# Patient Record
Sex: MALE | Race: WHITE | Employment: FULL TIME | URBAN - METROPOLITAN AREA
[De-identification: names, ages, dates, MRNs, and addresses within clinical notes are randomized per-mention and may not be internally consistent; named-entity substitution may affect disease eponyms.]

---

## 2017-01-10 ENCOUNTER — OFFICE VISIT (OUTPATIENT)
Dept: FAMILY MEDICINE CLINIC | Age: 52
End: 2017-01-10

## 2017-01-10 VITALS
RESPIRATION RATE: 24 BRPM | HEART RATE: 84 BPM | WEIGHT: 315 LBS | DIASTOLIC BLOOD PRESSURE: 88 MMHG | HEIGHT: 70 IN | TEMPERATURE: 98.1 F | SYSTOLIC BLOOD PRESSURE: 134 MMHG | BODY MASS INDEX: 45.1 KG/M2

## 2017-01-10 DIAGNOSIS — E29.1 HYPOGONADISM IN MALE: Primary | ICD-10-CM

## 2017-01-10 PROCEDURE — 99212 OFFICE O/P EST SF 10 MIN: CPT | Performed by: FAMILY MEDICINE

## 2017-01-10 PROCEDURE — 96372 THER/PROPH/DIAG INJ SC/IM: CPT | Performed by: FAMILY MEDICINE

## 2017-01-10 RX ORDER — TESTOSTERONE CYPIONATE 200 MG/ML
400 INJECTION INTRAMUSCULAR ONCE
Status: COMPLETED | OUTPATIENT
Start: 2017-01-10 | End: 2017-01-10

## 2017-01-10 RX ADMIN — TESTOSTERONE CYPIONATE 400 MG: 200 INJECTION INTRAMUSCULAR at 15:25

## 2017-02-09 ENCOUNTER — OFFICE VISIT (OUTPATIENT)
Dept: FAMILY MEDICINE CLINIC | Age: 52
End: 2017-02-09

## 2017-02-09 VITALS
WEIGHT: 314 LBS | TEMPERATURE: 97.5 F | DIASTOLIC BLOOD PRESSURE: 84 MMHG | RESPIRATION RATE: 24 BRPM | HEIGHT: 70 IN | HEART RATE: 84 BPM | SYSTOLIC BLOOD PRESSURE: 136 MMHG | BODY MASS INDEX: 44.95 KG/M2

## 2017-02-09 DIAGNOSIS — Z12.11 SCREEN FOR COLON CANCER: ICD-10-CM

## 2017-02-09 DIAGNOSIS — H66.002 ACUTE SUPPURATIVE OTITIS MEDIA OF LEFT EAR WITHOUT SPONTANEOUS RUPTURE OF TYMPANIC MEMBRANE, RECURRENCE NOT SPECIFIED: Primary | ICD-10-CM

## 2017-02-09 DIAGNOSIS — M05.79 RHEUMATOID ARTHRITIS INVOLVING MULTIPLE SITES WITH POSITIVE RHEUMATOID FACTOR (HCC): ICD-10-CM

## 2017-02-09 DIAGNOSIS — E29.1 HYPOGONADISM MALE: ICD-10-CM

## 2017-02-09 PROCEDURE — 3017F COLORECTAL CA SCREEN DOC REV: CPT | Performed by: FAMILY MEDICINE

## 2017-02-09 PROCEDURE — G8484 FLU IMMUNIZE NO ADMIN: HCPCS | Performed by: FAMILY MEDICINE

## 2017-02-09 PROCEDURE — 99213 OFFICE O/P EST LOW 20 MIN: CPT | Performed by: FAMILY MEDICINE

## 2017-02-09 PROCEDURE — G8417 CALC BMI ABV UP PARAM F/U: HCPCS | Performed by: FAMILY MEDICINE

## 2017-02-09 PROCEDURE — G8428 CUR MEDS NOT DOCUMENT: HCPCS | Performed by: FAMILY MEDICINE

## 2017-02-09 PROCEDURE — 96372 THER/PROPH/DIAG INJ SC/IM: CPT | Performed by: FAMILY MEDICINE

## 2017-02-09 PROCEDURE — 1036F TOBACCO NON-USER: CPT | Performed by: FAMILY MEDICINE

## 2017-02-09 RX ORDER — AMOXICILLIN 875 MG/1
875 TABLET, COATED ORAL 2 TIMES DAILY
Qty: 20 TABLET | Refills: 0 | Status: SHIPPED | OUTPATIENT
Start: 2017-02-09 | End: 2017-02-19

## 2017-02-09 RX ORDER — OXYCODONE AND ACETAMINOPHEN 10; 325 MG/1; MG/1
1 TABLET ORAL EVERY 4 HOURS PRN
Qty: 500 TABLET | Refills: 0 | Status: SHIPPED | OUTPATIENT
Start: 2017-02-09 | End: 2017-02-14 | Stop reason: SDUPTHER

## 2017-02-09 RX ORDER — TESTOSTERONE CYPIONATE 200 MG/ML
400 INJECTION INTRAMUSCULAR ONCE
Status: COMPLETED | OUTPATIENT
Start: 2017-02-09 | End: 2017-02-09

## 2017-02-09 RX ADMIN — TESTOSTERONE CYPIONATE 400 MG: 200 INJECTION INTRAMUSCULAR at 16:30

## 2017-02-12 ENCOUNTER — PATIENT MESSAGE (OUTPATIENT)
Dept: FAMILY MEDICINE CLINIC | Age: 52
End: 2017-02-12

## 2017-02-12 DIAGNOSIS — M05.79 RHEUMATOID ARTHRITIS INVOLVING MULTIPLE SITES WITH POSITIVE RHEUMATOID FACTOR (HCC): ICD-10-CM

## 2017-02-14 RX ORDER — OXYCODONE AND ACETAMINOPHEN 10; 325 MG/1; MG/1
1 TABLET ORAL EVERY 4 HOURS PRN
Qty: 500 TABLET | Refills: 0 | Status: SHIPPED | OUTPATIENT
Start: 2017-02-14 | End: 2017-05-04 | Stop reason: SDUPTHER

## 2017-03-03 DIAGNOSIS — I10 ESSENTIAL HYPERTENSION: Primary | ICD-10-CM

## 2017-03-03 DIAGNOSIS — E78.2 MIXED HYPERLIPIDEMIA: ICD-10-CM

## 2017-03-03 DIAGNOSIS — E11.9 TYPE 2 DIABETES MELLITUS WITHOUT COMPLICATION, WITHOUT LONG-TERM CURRENT USE OF INSULIN (HCC): ICD-10-CM

## 2017-03-04 DIAGNOSIS — E78.2 MIXED HYPERLIPIDEMIA: ICD-10-CM

## 2017-03-04 DIAGNOSIS — I10 ESSENTIAL HYPERTENSION: ICD-10-CM

## 2017-03-04 DIAGNOSIS — E11.9 TYPE 2 DIABETES MELLITUS WITHOUT COMPLICATION, WITHOUT LONG-TERM CURRENT USE OF INSULIN (HCC): ICD-10-CM

## 2017-03-04 LAB
ALBUMIN SERPL-MCNC: 4.7 G/DL (ref 3.9–4.9)
ALP BLD-CCNC: 48 U/L (ref 35–104)
ALT SERPL-CCNC: 22 U/L (ref 0–41)
ANION GAP SERPL CALCULATED.3IONS-SCNC: 14 MEQ/L (ref 7–13)
AST SERPL-CCNC: 17 U/L (ref 0–40)
BASOPHILS ABSOLUTE: 0.1 K/UL (ref 0–0.2)
BASOPHILS RELATIVE PERCENT: 0.8 %
BILIRUB SERPL-MCNC: 0.8 MG/DL (ref 0–1.2)
BUN BLDV-MCNC: 14 MG/DL (ref 6–20)
CALCIUM SERPL-MCNC: 9.3 MG/DL (ref 8.6–10.2)
CHLORIDE BLD-SCNC: 101 MEQ/L (ref 98–107)
CHOLESTEROL, TOTAL: 158 MG/DL (ref 0–199)
CO2: 23 MEQ/L (ref 22–29)
CREAT SERPL-MCNC: 1.09 MG/DL (ref 0.7–1.2)
EOSINOPHILS ABSOLUTE: 0.1 K/UL (ref 0–0.7)
EOSINOPHILS RELATIVE PERCENT: 2.2 %
GFR AFRICAN AMERICAN: >60
GFR NON-AFRICAN AMERICAN: >60
GLOBULIN: 2.6 G/DL (ref 2.3–3.5)
GLUCOSE BLD-MCNC: 159 MG/DL (ref 74–109)
HBA1C MFR BLD: 6.5 % (ref 4.8–5.9)
HCT VFR BLD CALC: 45.6 % (ref 42–52)
HDLC SERPL-MCNC: 33 MG/DL (ref 40–59)
HEMOGLOBIN: 15.3 G/DL (ref 14–18)
LDL CHOLESTEROL CALCULATED: 64 MG/DL (ref 0–129)
LYMPHOCYTES ABSOLUTE: 1.8 K/UL (ref 1–4.8)
LYMPHOCYTES RELATIVE PERCENT: 27.7 %
MCH RBC QN AUTO: 31.5 PG (ref 27–31.3)
MCHC RBC AUTO-ENTMCNC: 33.5 % (ref 33–37)
MCV RBC AUTO: 94.2 FL (ref 80–100)
MONOCYTES ABSOLUTE: 0.6 K/UL (ref 0.2–0.8)
MONOCYTES RELATIVE PERCENT: 9.4 %
NEUTROPHILS ABSOLUTE: 3.8 K/UL (ref 1.4–6.5)
NEUTROPHILS RELATIVE PERCENT: 59.9 %
PDW BLD-RTO: 13.7 % (ref 11.5–14.5)
PLATELET # BLD: 207 K/UL (ref 130–400)
POTASSIUM SERPL-SCNC: 4.3 MEQ/L (ref 3.5–5.1)
RBC # BLD: 4.84 M/UL (ref 4.7–6.1)
SODIUM BLD-SCNC: 138 MEQ/L (ref 132–144)
TOTAL PROTEIN: 7.3 G/DL (ref 6.4–8.1)
TRIGL SERPL-MCNC: 307 MG/DL (ref 0–200)
WBC # BLD: 6.4 K/UL (ref 4.8–10.8)

## 2017-03-09 ENCOUNTER — OFFICE VISIT (OUTPATIENT)
Dept: FAMILY MEDICINE CLINIC | Age: 52
End: 2017-03-09

## 2017-03-09 VITALS
HEART RATE: 78 BPM | WEIGHT: 309 LBS | SYSTOLIC BLOOD PRESSURE: 136 MMHG | RESPIRATION RATE: 24 BRPM | HEIGHT: 70 IN | TEMPERATURE: 99.3 F | BODY MASS INDEX: 44.24 KG/M2 | DIASTOLIC BLOOD PRESSURE: 88 MMHG

## 2017-03-09 DIAGNOSIS — I10 ESSENTIAL HYPERTENSION: ICD-10-CM

## 2017-03-09 DIAGNOSIS — E78.2 MIXED HYPERLIPIDEMIA: ICD-10-CM

## 2017-03-09 DIAGNOSIS — M05.79 RHEUMATOID ARTHRITIS INVOLVING MULTIPLE SITES WITH POSITIVE RHEUMATOID FACTOR (HCC): ICD-10-CM

## 2017-03-09 DIAGNOSIS — E11.9 TYPE 2 DIABETES MELLITUS WITHOUT COMPLICATION, WITHOUT LONG-TERM CURRENT USE OF INSULIN (HCC): Primary | ICD-10-CM

## 2017-03-09 DIAGNOSIS — E29.1 HYPOGONADISM MALE: ICD-10-CM

## 2017-03-09 DIAGNOSIS — M25.421 EFFUSION OF RIGHT OLECRANON BURSA: ICD-10-CM

## 2017-03-09 DIAGNOSIS — E03.9 HYPOTHYROIDISM, UNSPECIFIED TYPE: ICD-10-CM

## 2017-03-09 PROCEDURE — 96372 THER/PROPH/DIAG INJ SC/IM: CPT | Performed by: FAMILY MEDICINE

## 2017-03-09 PROCEDURE — 3044F HG A1C LEVEL LT 7.0%: CPT | Performed by: FAMILY MEDICINE

## 2017-03-09 PROCEDURE — 99214 OFFICE O/P EST MOD 30 MIN: CPT | Performed by: FAMILY MEDICINE

## 2017-03-09 PROCEDURE — 3017F COLORECTAL CA SCREEN DOC REV: CPT | Performed by: FAMILY MEDICINE

## 2017-03-09 PROCEDURE — G8417 CALC BMI ABV UP PARAM F/U: HCPCS | Performed by: FAMILY MEDICINE

## 2017-03-09 PROCEDURE — G8427 DOCREV CUR MEDS BY ELIG CLIN: HCPCS | Performed by: FAMILY MEDICINE

## 2017-03-09 PROCEDURE — G8484 FLU IMMUNIZE NO ADMIN: HCPCS | Performed by: FAMILY MEDICINE

## 2017-03-09 PROCEDURE — 1036F TOBACCO NON-USER: CPT | Performed by: FAMILY MEDICINE

## 2017-03-09 RX ORDER — TESTOSTERONE CYPIONATE 200 MG/ML
500 INJECTION INTRAMUSCULAR ONCE
Status: COMPLETED | OUTPATIENT
Start: 2017-03-09 | End: 2017-03-09

## 2017-03-09 RX ORDER — TRIAMCINOLONE ACETONIDE 40 MG/ML
80 INJECTION, SUSPENSION INTRA-ARTICULAR; INTRAMUSCULAR ONCE
Status: SHIPPED | OUTPATIENT
Start: 2017-03-09

## 2017-03-09 RX ADMIN — TESTOSTERONE CYPIONATE 500 MG: 200 INJECTION INTRAMUSCULAR at 04:45

## 2017-03-09 ASSESSMENT — ENCOUNTER SYMPTOMS
NAUSEA: 0
VOMITING: 0
ABDOMINAL PAIN: 0
COUGH: 0
BLOOD IN STOOL: 0
CONSTIPATION: 0
SHORTNESS OF BREATH: 0
TROUBLE SWALLOWING: 0
CHEST TIGHTNESS: 0
DIARRHEA: 0

## 2017-03-24 DIAGNOSIS — E11.9 TYPE 2 DIABETES MELLITUS WITHOUT COMPLICATION (HCC): ICD-10-CM

## 2017-04-05 ENCOUNTER — OFFICE VISIT (OUTPATIENT)
Dept: FAMILY MEDICINE CLINIC | Age: 52
End: 2017-04-05

## 2017-04-05 VITALS
TEMPERATURE: 98.3 F | SYSTOLIC BLOOD PRESSURE: 138 MMHG | HEIGHT: 70 IN | WEIGHT: 309 LBS | HEART RATE: 96 BPM | DIASTOLIC BLOOD PRESSURE: 86 MMHG | BODY MASS INDEX: 44.24 KG/M2 | RESPIRATION RATE: 24 BRPM

## 2017-04-05 DIAGNOSIS — H66.001 ACUTE SUPPURATIVE OTITIS MEDIA OF RIGHT EAR WITHOUT SPONTANEOUS RUPTURE OF TYMPANIC MEMBRANE, RECURRENCE NOT SPECIFIED: Primary | ICD-10-CM

## 2017-04-05 DIAGNOSIS — M05.79 RHEUMATOID ARTHRITIS INVOLVING MULTIPLE SITES WITH POSITIVE RHEUMATOID FACTOR (HCC): ICD-10-CM

## 2017-04-05 DIAGNOSIS — E29.1 HYPOGONADISM MALE: ICD-10-CM

## 2017-04-05 PROCEDURE — 3017F COLORECTAL CA SCREEN DOC REV: CPT | Performed by: FAMILY MEDICINE

## 2017-04-05 PROCEDURE — 1036F TOBACCO NON-USER: CPT | Performed by: FAMILY MEDICINE

## 2017-04-05 PROCEDURE — 99214 OFFICE O/P EST MOD 30 MIN: CPT | Performed by: FAMILY MEDICINE

## 2017-04-05 PROCEDURE — G8417 CALC BMI ABV UP PARAM F/U: HCPCS | Performed by: FAMILY MEDICINE

## 2017-04-05 PROCEDURE — 96372 THER/PROPH/DIAG INJ SC/IM: CPT | Performed by: FAMILY MEDICINE

## 2017-04-05 PROCEDURE — G8427 DOCREV CUR MEDS BY ELIG CLIN: HCPCS | Performed by: FAMILY MEDICINE

## 2017-04-05 RX ORDER — METHYLPREDNISOLONE 4 MG/1
TABLET ORAL
Qty: 21 TABLET | Refills: 2 | Status: SHIPPED | OUTPATIENT
Start: 2017-04-05 | End: 2018-05-14 | Stop reason: SDUPTHER

## 2017-04-05 RX ORDER — TESTOSTERONE CYPIONATE 200 MG/ML
500 INJECTION INTRAMUSCULAR ONCE
Status: COMPLETED | OUTPATIENT
Start: 2017-04-05 | End: 2017-04-05

## 2017-04-05 RX ORDER — AMOXICILLIN 875 MG/1
875 TABLET, COATED ORAL 2 TIMES DAILY
Qty: 20 TABLET | Refills: 0 | Status: SHIPPED | OUTPATIENT
Start: 2017-04-05 | End: 2017-04-15

## 2017-04-05 RX ADMIN — TESTOSTERONE CYPIONATE 500 MG: 200 INJECTION INTRAMUSCULAR at 17:03

## 2017-05-04 ENCOUNTER — OFFICE VISIT (OUTPATIENT)
Dept: FAMILY MEDICINE CLINIC | Age: 52
End: 2017-05-04

## 2017-05-04 VITALS
RESPIRATION RATE: 24 BRPM | BODY MASS INDEX: 44.95 KG/M2 | WEIGHT: 314 LBS | DIASTOLIC BLOOD PRESSURE: 94 MMHG | SYSTOLIC BLOOD PRESSURE: 136 MMHG | HEART RATE: 102 BPM | TEMPERATURE: 98.6 F | HEIGHT: 70 IN

## 2017-05-04 DIAGNOSIS — M05.79 RHEUMATOID ARTHRITIS INVOLVING MULTIPLE SITES WITH POSITIVE RHEUMATOID FACTOR (HCC): ICD-10-CM

## 2017-05-04 DIAGNOSIS — I10 ESSENTIAL HYPERTENSION: ICD-10-CM

## 2017-05-04 DIAGNOSIS — E11.9 TYPE 2 DIABETES MELLITUS WITHOUT COMPLICATION, WITHOUT LONG-TERM CURRENT USE OF INSULIN (HCC): ICD-10-CM

## 2017-05-04 DIAGNOSIS — E29.1 HYPOGONADISM MALE: Primary | ICD-10-CM

## 2017-05-04 DIAGNOSIS — E78.2 MIXED HYPERLIPIDEMIA: ICD-10-CM

## 2017-05-04 DIAGNOSIS — E03.9 ACQUIRED HYPOTHYROIDISM: ICD-10-CM

## 2017-05-04 PROCEDURE — 99214 OFFICE O/P EST MOD 30 MIN: CPT | Performed by: FAMILY MEDICINE

## 2017-05-04 PROCEDURE — G8417 CALC BMI ABV UP PARAM F/U: HCPCS | Performed by: FAMILY MEDICINE

## 2017-05-04 PROCEDURE — 1036F TOBACCO NON-USER: CPT | Performed by: FAMILY MEDICINE

## 2017-05-04 PROCEDURE — 3017F COLORECTAL CA SCREEN DOC REV: CPT | Performed by: FAMILY MEDICINE

## 2017-05-04 PROCEDURE — 96372 THER/PROPH/DIAG INJ SC/IM: CPT | Performed by: FAMILY MEDICINE

## 2017-05-04 PROCEDURE — 3044F HG A1C LEVEL LT 7.0%: CPT | Performed by: FAMILY MEDICINE

## 2017-05-04 PROCEDURE — G8427 DOCREV CUR MEDS BY ELIG CLIN: HCPCS | Performed by: FAMILY MEDICINE

## 2017-05-04 RX ORDER — OXYCODONE AND ACETAMINOPHEN 10; 325 MG/1; MG/1
1 TABLET ORAL EVERY 4 HOURS PRN
Qty: 500 TABLET | Refills: 0 | Status: SHIPPED | OUTPATIENT
Start: 2017-05-04 | End: 2017-08-08 | Stop reason: SDUPTHER

## 2017-05-04 RX ORDER — LOSARTAN POTASSIUM 100 MG/1
100 TABLET ORAL DAILY
Qty: 90 TABLET | Refills: 3 | Status: SHIPPED | OUTPATIENT
Start: 2017-05-04 | End: 2018-03-12 | Stop reason: SDUPTHER

## 2017-05-04 RX ORDER — TESTOSTERONE CYPIONATE 200 MG/ML
500 INJECTION INTRAMUSCULAR ONCE
Status: COMPLETED | OUTPATIENT
Start: 2017-05-04 | End: 2017-05-04

## 2017-05-04 RX ADMIN — TESTOSTERONE CYPIONATE 500 MG: 200 INJECTION INTRAMUSCULAR at 17:37

## 2017-06-10 DIAGNOSIS — E29.1 HYPOGONADISM MALE: ICD-10-CM

## 2017-06-10 DIAGNOSIS — E78.2 MIXED HYPERLIPIDEMIA: ICD-10-CM

## 2017-06-10 DIAGNOSIS — I10 ESSENTIAL HYPERTENSION: ICD-10-CM

## 2017-06-10 DIAGNOSIS — E11.9 TYPE 2 DIABETES MELLITUS WITHOUT COMPLICATION, WITHOUT LONG-TERM CURRENT USE OF INSULIN (HCC): ICD-10-CM

## 2017-06-10 DIAGNOSIS — E03.9 ACQUIRED HYPOTHYROIDISM: ICD-10-CM

## 2017-06-10 LAB
ALBUMIN SERPL-MCNC: 4.4 G/DL (ref 3.9–4.9)
ALP BLD-CCNC: 41 U/L (ref 35–104)
ALT SERPL-CCNC: 27 U/L (ref 0–41)
ANION GAP SERPL CALCULATED.3IONS-SCNC: 14 MEQ/L (ref 7–13)
AST SERPL-CCNC: 18 U/L (ref 0–40)
BASOPHILS ABSOLUTE: 0 K/UL (ref 0–0.2)
BASOPHILS RELATIVE PERCENT: 0.6 %
BILIRUB SERPL-MCNC: 0.5 MG/DL (ref 0–1.2)
BUN BLDV-MCNC: 18 MG/DL (ref 6–20)
CALCIUM SERPL-MCNC: 9 MG/DL (ref 8.6–10.2)
CHLORIDE BLD-SCNC: 101 MEQ/L (ref 98–107)
CHOLESTEROL, TOTAL: 188 MG/DL (ref 0–199)
CO2: 22 MEQ/L (ref 22–29)
CREAT SERPL-MCNC: 1.14 MG/DL (ref 0.7–1.2)
EOSINOPHILS ABSOLUTE: 0.1 K/UL (ref 0–0.7)
EOSINOPHILS RELATIVE PERCENT: 1.4 %
GFR AFRICAN AMERICAN: >60
GFR NON-AFRICAN AMERICAN: >60
GLOBULIN: 2.7 G/DL (ref 2.3–3.5)
GLUCOSE BLD-MCNC: 146 MG/DL (ref 74–109)
HBA1C MFR BLD: 7.2 % (ref 4.8–5.9)
HCT VFR BLD CALC: 44.2 % (ref 42–52)
HDLC SERPL-MCNC: 45 MG/DL (ref 40–59)
HEMOGLOBIN: 14.8 G/DL (ref 14–18)
LDL CHOLESTEROL CALCULATED: 89 MG/DL (ref 0–129)
LYMPHOCYTES ABSOLUTE: 2 K/UL (ref 1–4.8)
LYMPHOCYTES RELATIVE PERCENT: 34.1 %
MCH RBC QN AUTO: 31.7 PG (ref 27–31.3)
MCHC RBC AUTO-ENTMCNC: 33.5 % (ref 33–37)
MCV RBC AUTO: 94.6 FL (ref 80–100)
MONOCYTES ABSOLUTE: 0.5 K/UL (ref 0.2–0.8)
MONOCYTES RELATIVE PERCENT: 8.1 %
NEUTROPHILS ABSOLUTE: 3.2 K/UL (ref 1.4–6.5)
NEUTROPHILS RELATIVE PERCENT: 55.8 %
PDW BLD-RTO: 13.9 % (ref 11.5–14.5)
PLATELET # BLD: 239 K/UL (ref 130–400)
POTASSIUM SERPL-SCNC: 4.7 MEQ/L (ref 3.5–5.1)
RBC # BLD: 4.67 M/UL (ref 4.7–6.1)
SODIUM BLD-SCNC: 137 MEQ/L (ref 132–144)
TOTAL PROTEIN: 7.1 G/DL (ref 6.4–8.1)
TRIGL SERPL-MCNC: 270 MG/DL (ref 0–200)
TSH SERPL DL<=0.05 MIU/L-ACNC: 2.31 UIU/ML (ref 0.27–4.2)
WBC # BLD: 5.8 K/UL (ref 4.8–10.8)

## 2017-06-12 ENCOUNTER — OFFICE VISIT (OUTPATIENT)
Dept: FAMILY MEDICINE CLINIC | Age: 52
End: 2017-06-12

## 2017-06-12 VITALS
WEIGHT: 313 LBS | BODY MASS INDEX: 44.91 KG/M2 | SYSTOLIC BLOOD PRESSURE: 132 MMHG | HEART RATE: 102 BPM | RESPIRATION RATE: 18 BRPM | DIASTOLIC BLOOD PRESSURE: 86 MMHG | TEMPERATURE: 97.9 F

## 2017-06-12 DIAGNOSIS — I10 ESSENTIAL HYPERTENSION: ICD-10-CM

## 2017-06-12 DIAGNOSIS — M05.79 RHEUMATOID ARTHRITIS INVOLVING MULTIPLE SITES WITH POSITIVE RHEUMATOID FACTOR (HCC): ICD-10-CM

## 2017-06-12 DIAGNOSIS — E11.9 TYPE 2 DIABETES MELLITUS WITHOUT COMPLICATION, WITHOUT LONG-TERM CURRENT USE OF INSULIN (HCC): ICD-10-CM

## 2017-06-12 DIAGNOSIS — E11.9 TYPE 2 DIABETES MELLITUS WITHOUT COMPLICATION, WITHOUT LONG-TERM CURRENT USE OF INSULIN (HCC): Primary | ICD-10-CM

## 2017-06-12 DIAGNOSIS — E29.1 HYPOGONADISM MALE: ICD-10-CM

## 2017-06-12 DIAGNOSIS — E03.9 ACQUIRED HYPOTHYROIDISM: ICD-10-CM

## 2017-06-12 DIAGNOSIS — E78.2 MIXED HYPERLIPIDEMIA: ICD-10-CM

## 2017-06-12 LAB
CREATININE URINE: 150.4 MG/DL
MICROALBUMIN UR-MCNC: 3.7 MG/DL
MICROALBUMIN/CREAT UR-RTO: 24.6 MG/G (ref 0–30)
TESTOSTERONE TOTAL-MALE: 41 NG/DL (ref 300–890)

## 2017-06-12 PROCEDURE — G8427 DOCREV CUR MEDS BY ELIG CLIN: HCPCS | Performed by: FAMILY MEDICINE

## 2017-06-12 PROCEDURE — 3017F COLORECTAL CA SCREEN DOC REV: CPT | Performed by: FAMILY MEDICINE

## 2017-06-12 PROCEDURE — 3046F HEMOGLOBIN A1C LEVEL >9.0%: CPT | Performed by: FAMILY MEDICINE

## 2017-06-12 PROCEDURE — G8417 CALC BMI ABV UP PARAM F/U: HCPCS | Performed by: FAMILY MEDICINE

## 2017-06-12 PROCEDURE — 96372 THER/PROPH/DIAG INJ SC/IM: CPT | Performed by: FAMILY MEDICINE

## 2017-06-12 PROCEDURE — 99214 OFFICE O/P EST MOD 30 MIN: CPT | Performed by: FAMILY MEDICINE

## 2017-06-12 PROCEDURE — 1036F TOBACCO NON-USER: CPT | Performed by: FAMILY MEDICINE

## 2017-06-12 RX ORDER — TESTOSTERONE CYPIONATE 200 MG/ML
500 INJECTION INTRAMUSCULAR ONCE
Status: COMPLETED | OUTPATIENT
Start: 2017-06-12 | End: 2017-06-12

## 2017-06-12 RX ORDER — OXYCODONE AND ACETAMINOPHEN 10; 325 MG/1; MG/1
1 TABLET ORAL EVERY 4 HOURS PRN
Qty: 500 TABLET | Refills: 0 | Status: CANCELLED | OUTPATIENT
Start: 2017-06-12

## 2017-06-12 RX ADMIN — TESTOSTERONE CYPIONATE 500 MG: 200 INJECTION INTRAMUSCULAR at 16:31

## 2017-06-12 ASSESSMENT — ENCOUNTER SYMPTOMS
SHORTNESS OF BREATH: 0
CHEST TIGHTNESS: 0
ABDOMINAL PAIN: 0
DIARRHEA: 0
NAUSEA: 0
TROUBLE SWALLOWING: 0
COUGH: 0
VOMITING: 0
CONSTIPATION: 0
BLOOD IN STOOL: 0

## 2017-06-12 ASSESSMENT — PATIENT HEALTH QUESTIONNAIRE - PHQ9
SUM OF ALL RESPONSES TO PHQ QUESTIONS 1-9: 0
SUM OF ALL RESPONSES TO PHQ9 QUESTIONS 1 & 2: 0
2. FEELING DOWN, DEPRESSED OR HOPELESS: 0
1. LITTLE INTEREST OR PLEASURE IN DOING THINGS: 0

## 2017-07-12 ENCOUNTER — OFFICE VISIT (OUTPATIENT)
Dept: FAMILY MEDICINE CLINIC | Age: 52
End: 2017-07-12

## 2017-07-12 VITALS
DIASTOLIC BLOOD PRESSURE: 88 MMHG | WEIGHT: 315 LBS | SYSTOLIC BLOOD PRESSURE: 126 MMHG | TEMPERATURE: 98.2 F | HEART RATE: 72 BPM | BODY MASS INDEX: 45.2 KG/M2 | RESPIRATION RATE: 24 BRPM

## 2017-07-12 DIAGNOSIS — E29.1 HYPOGONADISM MALE: ICD-10-CM

## 2017-07-12 DIAGNOSIS — I10 ESSENTIAL HYPERTENSION: Primary | ICD-10-CM

## 2017-07-12 PROCEDURE — G8427 DOCREV CUR MEDS BY ELIG CLIN: HCPCS | Performed by: FAMILY MEDICINE

## 2017-07-12 PROCEDURE — 96372 THER/PROPH/DIAG INJ SC/IM: CPT | Performed by: FAMILY MEDICINE

## 2017-07-12 PROCEDURE — 3017F COLORECTAL CA SCREEN DOC REV: CPT | Performed by: FAMILY MEDICINE

## 2017-07-12 PROCEDURE — G8417 CALC BMI ABV UP PARAM F/U: HCPCS | Performed by: FAMILY MEDICINE

## 2017-07-12 PROCEDURE — 99213 OFFICE O/P EST LOW 20 MIN: CPT | Performed by: FAMILY MEDICINE

## 2017-07-12 PROCEDURE — 1036F TOBACCO NON-USER: CPT | Performed by: FAMILY MEDICINE

## 2017-07-12 RX ORDER — CARVEDILOL 12.5 MG/1
12.5 TABLET ORAL 2 TIMES DAILY
Qty: 180 TABLET | Refills: 3 | Status: SHIPPED | OUTPATIENT
Start: 2017-07-12 | End: 2018-07-12 | Stop reason: SDUPTHER

## 2017-07-12 RX ORDER — TESTOSTERONE CYPIONATE 200 MG/ML
500 INJECTION INTRAMUSCULAR ONCE
Status: COMPLETED | OUTPATIENT
Start: 2017-07-12 | End: 2017-07-12

## 2017-07-12 RX ADMIN — TESTOSTERONE CYPIONATE 500 MG: 200 INJECTION INTRAMUSCULAR at 14:34

## 2017-08-08 ENCOUNTER — OFFICE VISIT (OUTPATIENT)
Dept: FAMILY MEDICINE CLINIC | Age: 52
End: 2017-08-08

## 2017-08-08 VITALS
HEART RATE: 84 BPM | SYSTOLIC BLOOD PRESSURE: 132 MMHG | RESPIRATION RATE: 24 BRPM | DIASTOLIC BLOOD PRESSURE: 78 MMHG | WEIGHT: 315 LBS | TEMPERATURE: 98.5 F | BODY MASS INDEX: 45.77 KG/M2

## 2017-08-08 DIAGNOSIS — E11.9 TYPE 2 DIABETES MELLITUS WITHOUT COMPLICATION, WITHOUT LONG-TERM CURRENT USE OF INSULIN (HCC): ICD-10-CM

## 2017-08-08 DIAGNOSIS — M05.79 RHEUMATOID ARTHRITIS INVOLVING MULTIPLE SITES WITH POSITIVE RHEUMATOID FACTOR (HCC): ICD-10-CM

## 2017-08-08 DIAGNOSIS — E29.1 HYPOGONADISM MALE: Primary | ICD-10-CM

## 2017-08-08 DIAGNOSIS — I10 ESSENTIAL HYPERTENSION: ICD-10-CM

## 2017-08-08 DIAGNOSIS — E03.9 ACQUIRED HYPOTHYROIDISM: ICD-10-CM

## 2017-08-08 DIAGNOSIS — E78.2 MIXED HYPERLIPIDEMIA: ICD-10-CM

## 2017-08-08 PROCEDURE — 3017F COLORECTAL CA SCREEN DOC REV: CPT | Performed by: FAMILY MEDICINE

## 2017-08-08 PROCEDURE — G8427 DOCREV CUR MEDS BY ELIG CLIN: HCPCS | Performed by: FAMILY MEDICINE

## 2017-08-08 PROCEDURE — 99213 OFFICE O/P EST LOW 20 MIN: CPT | Performed by: FAMILY MEDICINE

## 2017-08-08 PROCEDURE — 3046F HEMOGLOBIN A1C LEVEL >9.0%: CPT | Performed by: FAMILY MEDICINE

## 2017-08-08 PROCEDURE — G8417 CALC BMI ABV UP PARAM F/U: HCPCS | Performed by: FAMILY MEDICINE

## 2017-08-08 PROCEDURE — 96372 THER/PROPH/DIAG INJ SC/IM: CPT | Performed by: FAMILY MEDICINE

## 2017-08-08 PROCEDURE — 1036F TOBACCO NON-USER: CPT | Performed by: FAMILY MEDICINE

## 2017-08-08 RX ORDER — AMLODIPINE BESYLATE 10 MG/1
10 TABLET ORAL DAILY
Qty: 90 TABLET | Refills: 3 | Status: SHIPPED | OUTPATIENT
Start: 2017-08-08 | End: 2018-09-18 | Stop reason: SDUPTHER

## 2017-08-08 RX ORDER — TRAMADOL HYDROCHLORIDE 50 MG/1
100 TABLET ORAL EVERY 6 HOURS PRN
Qty: 540 TABLET | Refills: 0 | Status: SHIPPED | OUTPATIENT
Start: 2017-08-08 | End: 2017-12-12 | Stop reason: SDUPTHER

## 2017-08-08 RX ORDER — TESTOSTERONE CYPIONATE 200 MG/ML
500 INJECTION INTRAMUSCULAR ONCE
Status: COMPLETED | OUTPATIENT
Start: 2017-08-08 | End: 2017-08-08

## 2017-08-08 RX ORDER — OXYCODONE AND ACETAMINOPHEN 10; 325 MG/1; MG/1
1 TABLET ORAL EVERY 4 HOURS PRN
Qty: 500 TABLET | Refills: 0 | Status: SHIPPED | OUTPATIENT
Start: 2017-08-08 | End: 2017-11-06 | Stop reason: SDUPTHER

## 2017-08-08 RX ORDER — PRAVASTATIN SODIUM 40 MG
40 TABLET ORAL EVERY EVENING
Qty: 90 TABLET | Refills: 3 | Status: SHIPPED | OUTPATIENT
Start: 2017-08-08 | End: 2018-12-18 | Stop reason: SDUPTHER

## 2017-08-08 RX ORDER — LEVOTHYROXINE SODIUM 75 MCG
TABLET ORAL
Qty: 90 TABLET | Refills: 3 | Status: SHIPPED | OUTPATIENT
Start: 2017-08-08 | End: 2018-08-03 | Stop reason: SDUPTHER

## 2017-08-08 RX ADMIN — TESTOSTERONE CYPIONATE 500 MG: 200 INJECTION INTRAMUSCULAR at 17:32

## 2017-09-09 DIAGNOSIS — E29.1 HYPOGONADISM MALE: ICD-10-CM

## 2017-09-09 DIAGNOSIS — E11.9 TYPE 2 DIABETES MELLITUS WITHOUT COMPLICATION, WITHOUT LONG-TERM CURRENT USE OF INSULIN (HCC): ICD-10-CM

## 2017-09-09 DIAGNOSIS — E78.2 MIXED HYPERLIPIDEMIA: ICD-10-CM

## 2017-09-09 DIAGNOSIS — E03.9 ACQUIRED HYPOTHYROIDISM: ICD-10-CM

## 2017-09-09 LAB
ALBUMIN SERPL-MCNC: 4.6 G/DL (ref 3.9–4.9)
ALP BLD-CCNC: 43 U/L (ref 35–104)
ALT SERPL-CCNC: 25 U/L (ref 0–41)
ANION GAP SERPL CALCULATED.3IONS-SCNC: 17 MEQ/L (ref 7–13)
AST SERPL-CCNC: 18 U/L (ref 0–40)
BILIRUB SERPL-MCNC: 0.5 MG/DL (ref 0–1.2)
BUN BLDV-MCNC: 22 MG/DL (ref 6–20)
CALCIUM SERPL-MCNC: 9.4 MG/DL (ref 8.6–10.2)
CHLORIDE BLD-SCNC: 101 MEQ/L (ref 98–107)
CHOLESTEROL, TOTAL: 180 MG/DL (ref 0–199)
CO2: 22 MEQ/L (ref 22–29)
CREAT SERPL-MCNC: 0.98 MG/DL (ref 0.7–1.2)
GFR AFRICAN AMERICAN: >60
GFR NON-AFRICAN AMERICAN: >60
GLOBULIN: 3 G/DL (ref 2.3–3.5)
GLUCOSE BLD-MCNC: 153 MG/DL (ref 74–109)
HBA1C MFR BLD: 6.8 % (ref 4.8–5.9)
HDLC SERPL-MCNC: 38 MG/DL (ref 40–59)
LDL CHOLESTEROL CALCULATED: 79 MG/DL (ref 0–129)
POTASSIUM SERPL-SCNC: 4.7 MEQ/L (ref 3.5–5.1)
SODIUM BLD-SCNC: 140 MEQ/L (ref 132–144)
TOTAL PROTEIN: 7.6 G/DL (ref 6.4–8.1)
TRIGL SERPL-MCNC: 317 MG/DL (ref 0–200)
TSH SERPL DL<=0.05 MIU/L-ACNC: 2.54 UIU/ML (ref 0.27–4.2)

## 2017-09-11 ENCOUNTER — OFFICE VISIT (OUTPATIENT)
Dept: FAMILY MEDICINE CLINIC | Age: 52
End: 2017-09-11

## 2017-09-11 VITALS
SYSTOLIC BLOOD PRESSURE: 138 MMHG | RESPIRATION RATE: 18 BRPM | TEMPERATURE: 98 F | BODY MASS INDEX: 45.34 KG/M2 | HEART RATE: 78 BPM | WEIGHT: 315 LBS | DIASTOLIC BLOOD PRESSURE: 68 MMHG

## 2017-09-11 DIAGNOSIS — E78.2 MIXED HYPERLIPIDEMIA: ICD-10-CM

## 2017-09-11 DIAGNOSIS — E11.9 TYPE 2 DIABETES MELLITUS WITHOUT COMPLICATION, WITHOUT LONG-TERM CURRENT USE OF INSULIN (HCC): Primary | ICD-10-CM

## 2017-09-11 DIAGNOSIS — I10 ESSENTIAL HYPERTENSION: ICD-10-CM

## 2017-09-11 DIAGNOSIS — M05.79 RHEUMATOID ARTHRITIS INVOLVING MULTIPLE SITES WITH POSITIVE RHEUMATOID FACTOR (HCC): ICD-10-CM

## 2017-09-11 DIAGNOSIS — E29.1 HYPOGONADISM MALE: ICD-10-CM

## 2017-09-11 LAB
SEX HORMONE BINDING GLOBULIN: 8 NMOL/L (ref 11–80)
TESTOSTERONE FREE PERCENT: 2.8 % (ref 1.6–2.9)
TESTOSTERONE FREE, CALC: 15 PG/ML (ref 47–244)
TESTOSTERONE TOTAL-MALE: 55 NG/DL (ref 300–890)

## 2017-09-11 PROCEDURE — G8427 DOCREV CUR MEDS BY ELIG CLIN: HCPCS | Performed by: FAMILY MEDICINE

## 2017-09-11 PROCEDURE — 99214 OFFICE O/P EST MOD 30 MIN: CPT | Performed by: FAMILY MEDICINE

## 2017-09-11 PROCEDURE — 3017F COLORECTAL CA SCREEN DOC REV: CPT | Performed by: FAMILY MEDICINE

## 2017-09-11 PROCEDURE — G8417 CALC BMI ABV UP PARAM F/U: HCPCS | Performed by: FAMILY MEDICINE

## 2017-09-11 PROCEDURE — 96372 THER/PROPH/DIAG INJ SC/IM: CPT | Performed by: FAMILY MEDICINE

## 2017-09-11 PROCEDURE — 1036F TOBACCO NON-USER: CPT | Performed by: FAMILY MEDICINE

## 2017-09-11 PROCEDURE — 3046F HEMOGLOBIN A1C LEVEL >9.0%: CPT | Performed by: FAMILY MEDICINE

## 2017-09-11 RX ORDER — TESTOSTERONE CYPIONATE 200 MG/ML
500 INJECTION INTRAMUSCULAR ONCE
Status: COMPLETED | OUTPATIENT
Start: 2017-09-11 | End: 2017-09-11

## 2017-09-11 RX ADMIN — TESTOSTERONE CYPIONATE 500 MG: 200 INJECTION INTRAMUSCULAR at 18:27

## 2017-09-11 ASSESSMENT — ENCOUNTER SYMPTOMS
TROUBLE SWALLOWING: 0
CHEST TIGHTNESS: 0
NAUSEA: 0
VOMITING: 0
CONSTIPATION: 0
DIARRHEA: 0
SHORTNESS OF BREATH: 0
COUGH: 0
BLOOD IN STOOL: 0
ABDOMINAL PAIN: 0

## 2017-10-09 ENCOUNTER — OFFICE VISIT (OUTPATIENT)
Dept: FAMILY MEDICINE CLINIC | Age: 52
End: 2017-10-09

## 2017-10-09 VITALS
HEART RATE: 84 BPM | WEIGHT: 314 LBS | TEMPERATURE: 98.5 F | DIASTOLIC BLOOD PRESSURE: 110 MMHG | SYSTOLIC BLOOD PRESSURE: 152 MMHG | BODY MASS INDEX: 45.05 KG/M2 | RESPIRATION RATE: 18 BRPM

## 2017-10-09 DIAGNOSIS — I10 ESSENTIAL HYPERTENSION: Primary | ICD-10-CM

## 2017-10-09 DIAGNOSIS — E29.1 HYPOGONADISM MALE: ICD-10-CM

## 2017-10-09 PROCEDURE — 96372 THER/PROPH/DIAG INJ SC/IM: CPT | Performed by: FAMILY MEDICINE

## 2017-10-09 PROCEDURE — G8417 CALC BMI ABV UP PARAM F/U: HCPCS | Performed by: FAMILY MEDICINE

## 2017-10-09 PROCEDURE — 1036F TOBACCO NON-USER: CPT | Performed by: FAMILY MEDICINE

## 2017-10-09 PROCEDURE — G8484 FLU IMMUNIZE NO ADMIN: HCPCS | Performed by: FAMILY MEDICINE

## 2017-10-09 PROCEDURE — 99212 OFFICE O/P EST SF 10 MIN: CPT | Performed by: FAMILY MEDICINE

## 2017-10-09 PROCEDURE — G8427 DOCREV CUR MEDS BY ELIG CLIN: HCPCS | Performed by: FAMILY MEDICINE

## 2017-10-09 PROCEDURE — 3017F COLORECTAL CA SCREEN DOC REV: CPT | Performed by: FAMILY MEDICINE

## 2017-10-09 RX ORDER — TESTOSTERONE CYPIONATE 200 MG/ML
500 INJECTION INTRAMUSCULAR ONCE
Status: COMPLETED | OUTPATIENT
Start: 2017-10-09 | End: 2017-10-09

## 2017-10-09 RX ADMIN — TESTOSTERONE CYPIONATE 500 MG: 200 INJECTION INTRAMUSCULAR at 14:22

## 2017-11-06 ENCOUNTER — OFFICE VISIT (OUTPATIENT)
Dept: FAMILY MEDICINE CLINIC | Age: 52
End: 2017-11-06

## 2017-11-06 VITALS
WEIGHT: 297 LBS | TEMPERATURE: 98.7 F | RESPIRATION RATE: 12 BRPM | BODY MASS INDEX: 42.52 KG/M2 | HEART RATE: 90 BPM | SYSTOLIC BLOOD PRESSURE: 132 MMHG | DIASTOLIC BLOOD PRESSURE: 88 MMHG | HEIGHT: 70 IN

## 2017-11-06 DIAGNOSIS — J01.40 ACUTE NON-RECURRENT PANSINUSITIS: ICD-10-CM

## 2017-11-06 DIAGNOSIS — E29.1 HYPOGONADISM MALE: Primary | ICD-10-CM

## 2017-11-06 DIAGNOSIS — I10 ESSENTIAL HYPERTENSION: ICD-10-CM

## 2017-11-06 DIAGNOSIS — Z12.11 ENCOUNTER FOR SCREENING COLONOSCOPY: ICD-10-CM

## 2017-11-06 DIAGNOSIS — M05.79 RHEUMATOID ARTHRITIS INVOLVING MULTIPLE SITES WITH POSITIVE RHEUMATOID FACTOR (HCC): ICD-10-CM

## 2017-11-06 DIAGNOSIS — R19.7 DIARRHEA, UNSPECIFIED TYPE: ICD-10-CM

## 2017-11-06 PROCEDURE — 99214 OFFICE O/P EST MOD 30 MIN: CPT | Performed by: FAMILY MEDICINE

## 2017-11-06 PROCEDURE — G8427 DOCREV CUR MEDS BY ELIG CLIN: HCPCS | Performed by: FAMILY MEDICINE

## 2017-11-06 PROCEDURE — G8484 FLU IMMUNIZE NO ADMIN: HCPCS | Performed by: FAMILY MEDICINE

## 2017-11-06 PROCEDURE — G8417 CALC BMI ABV UP PARAM F/U: HCPCS | Performed by: FAMILY MEDICINE

## 2017-11-06 PROCEDURE — 1036F TOBACCO NON-USER: CPT | Performed by: FAMILY MEDICINE

## 2017-11-06 PROCEDURE — 96372 THER/PROPH/DIAG INJ SC/IM: CPT | Performed by: FAMILY MEDICINE

## 2017-11-06 PROCEDURE — 3017F COLORECTAL CA SCREEN DOC REV: CPT | Performed by: FAMILY MEDICINE

## 2017-11-06 RX ORDER — TESTOSTERONE CYPIONATE 200 MG/ML
500 INJECTION INTRAMUSCULAR ONCE
Status: COMPLETED | OUTPATIENT
Start: 2017-11-06 | End: 2017-11-06

## 2017-11-06 RX ORDER — OXYCODONE AND ACETAMINOPHEN 10; 325 MG/1; MG/1
1 TABLET ORAL EVERY 4 HOURS PRN
Qty: 500 TABLET | Refills: 0 | Status: SHIPPED | OUTPATIENT
Start: 2017-11-06 | End: 2018-03-12 | Stop reason: ALTCHOICE

## 2017-11-06 RX ORDER — AMOXICILLIN AND CLAVULANATE POTASSIUM 875; 125 MG/1; MG/1
1 TABLET, FILM COATED ORAL EVERY 12 HOURS
Qty: 20 TABLET | Refills: 0 | Status: SHIPPED | OUTPATIENT
Start: 2017-11-06 | End: 2017-11-16

## 2017-11-06 RX ADMIN — TESTOSTERONE CYPIONATE 500 MG: 200 INJECTION INTRAMUSCULAR at 15:04

## 2017-11-06 NOTE — PROGRESS NOTES
well-developed and well-nourished. Neck: Normal range of motion. Neck supple. No thyromegaly present. Cardiovascular: Normal rate, regular rhythm and normal heart sounds. Pulmonary/Chest: Effort normal and breath sounds normal.   Abdominal: Bowel sounds are normal. He exhibits no distension. This patient is morbidly obese. Genitourinary:   Genitourinary Comments: Small external hemorrhoids visible, this is checked prior to administration of the testosterone injection in the right dorsal lateral gluteus area. This was done by myself. Musculoskeletal:   There is no costovertebral angle tenderness. Lumbar spine and sacroiliac joints are non tender. There is no edema in the four extremities. Pulses palpable at both posterior tibial and radial arteries. Psychiatric: He has a normal mood and affect. His behavior is normal.       DIAGNOSIS:   1. Hypogonadism male  testosterone cypionate (DEPOTESTOTERONE CYPIONATE) injection 500 mg    Stable with continuing monthly injections. 2. Essential hypertension      Well-controlled, continue current medication. 3. Rheumatoid arthritis involving multiple sites with positive rheumatoid factor (HCC)  oxyCODONE-acetaminophen (PERCOCET)  MG per tablet    Well-controlled, continue current medication. 4. Diarrhea, unspecified type      Question etiology, wait for colonoscopy. 5. Encounter for screening colonoscopy  Ambulatory referral to Gastroenterology    Due for screening colonoscopy. 6. Acute non-recurrent pansinusitis  amoxicillin-clavulanate (AUGMENTIN) 875-125 MG per tablet    Symptomatic, treat. Plan for follow up: Follow up in scheduled time with blood work as ordered. Other follow up as needed.       Electronically signed by Rivas Amato, 10:04 PM 11/6/17

## 2017-11-15 ENCOUNTER — HOSPITAL ENCOUNTER (OUTPATIENT)
Age: 52
Setting detail: OUTPATIENT SURGERY
Discharge: HOME | End: 2017-11-15
Attending: SPECIALIST | Admitting: SPECIALIST

## 2017-11-15 ENCOUNTER — ANESTHESIA (OUTPATIENT)
Dept: ENDOSCOPY | Age: 52
End: 2017-11-15

## 2017-11-15 ENCOUNTER — ANESTHESIA EVENT (OUTPATIENT)
Dept: ENDOSCOPY | Age: 52
End: 2017-11-15

## 2017-11-15 VITALS
WEIGHT: 297 LBS | DIASTOLIC BLOOD PRESSURE: 98 MMHG | TEMPERATURE: 96.6 F | RESPIRATION RATE: 16 BRPM | OXYGEN SATURATION: 97 % | SYSTOLIC BLOOD PRESSURE: 124 MMHG | BODY MASS INDEX: 42.52 KG/M2 | HEART RATE: 73 BPM | HEIGHT: 70 IN

## 2017-11-15 VITALS
OXYGEN SATURATION: 97 % | SYSTOLIC BLOOD PRESSURE: 121 MMHG | RESPIRATION RATE: 15 BRPM | DIASTOLIC BLOOD PRESSURE: 58 MMHG

## 2017-11-15 PROCEDURE — 6360000002 HC RX W HCPCS: Performed by: NURSE ANESTHETIST, CERTIFIED REGISTERED

## 2017-11-15 PROCEDURE — 7100000010 HC PHASE II RECOVERY - FIRST 15 MIN: Performed by: SPECIALIST

## 2017-11-15 PROCEDURE — 7100000011 HC PHASE II RECOVERY - ADDTL 15 MIN: Performed by: SPECIALIST

## 2017-11-15 PROCEDURE — 88305 TISSUE EXAM BY PATHOLOGIST: CPT

## 2017-11-15 PROCEDURE — 3700000001 HC ADD 15 MINUTES (ANESTHESIA): Performed by: SPECIALIST

## 2017-11-15 PROCEDURE — 3700000000 HC ANESTHESIA ATTENDED CARE: Performed by: SPECIALIST

## 2017-11-15 PROCEDURE — 2580000003 HC RX 258: Performed by: SPECIALIST

## 2017-11-15 PROCEDURE — 3609027000 HC COLONOSCOPY: Performed by: SPECIALIST

## 2017-11-15 RX ORDER — SODIUM CHLORIDE 0.9 % (FLUSH) 0.9 %
10 SYRINGE (ML) INJECTION EVERY 12 HOURS SCHEDULED
Status: DISCONTINUED | OUTPATIENT
Start: 2017-11-15 | End: 2017-11-15 | Stop reason: HOSPADM

## 2017-11-15 RX ORDER — SODIUM CHLORIDE 0.9 % (FLUSH) 0.9 %
10 SYRINGE (ML) INJECTION PRN
Status: DISCONTINUED | OUTPATIENT
Start: 2017-11-15 | End: 2017-11-15 | Stop reason: HOSPADM

## 2017-11-15 RX ORDER — SODIUM CHLORIDE 9 MG/ML
INJECTION, SOLUTION INTRAVENOUS CONTINUOUS
Status: DISCONTINUED | OUTPATIENT
Start: 2017-11-15 | End: 2017-11-15 | Stop reason: HOSPADM

## 2017-11-15 RX ORDER — ONDANSETRON 2 MG/ML
4 INJECTION INTRAMUSCULAR; INTRAVENOUS
Status: DISCONTINUED | OUTPATIENT
Start: 2017-11-15 | End: 2017-11-15 | Stop reason: HOSPADM

## 2017-11-15 RX ORDER — LIDOCAINE HYDROCHLORIDE 10 MG/ML
1 INJECTION, SOLUTION EPIDURAL; INFILTRATION; INTRACAUDAL; PERINEURAL
Status: DISCONTINUED | OUTPATIENT
Start: 2017-11-15 | End: 2017-11-15 | Stop reason: HOSPADM

## 2017-11-15 RX ORDER — PROPOFOL 10 MG/ML
INJECTION, EMULSION INTRAVENOUS CONTINUOUS PRN
Status: DISCONTINUED | OUTPATIENT
Start: 2017-11-15 | End: 2017-11-15 | Stop reason: SDUPTHER

## 2017-11-15 RX ADMIN — PROPOFOL 100 MCG/KG/MIN: 10 INJECTION, EMULSION INTRAVENOUS at 09:57

## 2017-11-15 RX ADMIN — SODIUM CHLORIDE: 9 INJECTION, SOLUTION INTRAVENOUS at 09:30

## 2017-11-15 NOTE — ANESTHESIA POSTPROCEDURE EVALUATION
Department of Anesthesiology  Postprocedure Note    Patient: Franco Castro  MRN: [id number]  YOB: 1965  Date of evaluation: 11/15/2017  Time:  10:19 AM     Procedure Summary     Date:  11/15/17 Room / Location:  [address] [place] [address] 01 / 59 [facility]    Anesthesia Start:  0957 Anesthesia Stop:      Procedure:  COLONOSCOPY (N/A ) Diagnosis:  (SCREENING Z12.11 ())    Surgeon:  Srikanth Kaye MD Responsible Provider:  Adriano Coronel CRNA    Anesthesia Type:  MAC ASA Status:  3          Anesthesia Type: MAC    Uriel Phase I:      Uriel Phase II:      Last vitals: Reviewed and per EMR flowsheets.        Anesthesia Post Evaluation    Patient location during evaluation: bedside  Patient participation: complete - patient participated  Level of consciousness: awake and awake and alert  Airway patency: patent  Nausea & Vomiting: no nausea and no vomiting  Complications: no  Cardiovascular status: blood pressure returned to baseline and hemodynamically stable  Respiratory status: acceptable  Hydration status: euvolemic

## 2017-11-15 NOTE — ANESTHESIA PRE PROCEDURE
Department of Anesthesiology  Preprocedure Note       Name:  Clarissa Chris   Age:  45 y.o.  :  1965                                          MRN:  [id number]         Date:  11/15/2017      Surgeon: Rogers Barrera):  Nani Harrison MD    Procedure: Procedure(s):  COLONOSCOPY    Medications prior to admission:   Prior to Admission medications    Medication Sig Start Date End Date Taking? Authorizing Provider   oxyCODONE-acetaminophen (PERCOCET)  MG per tablet Take 1 tablet by mouth every 4 hours as needed for Pain  Do not fill before 17. . 17   Luis Deng, MD   amoxicillin-clavulanate (AUGMENTIN) 875-125 MG per tablet Take 1 tablet by mouth every 12 hours for 10 days 17  Luis Deng, MD   SYNTHROID 75 MCG tablet TAKE 1 TABLET DAILY 17   Luis Deng MD   amLODIPine (NORVASC) 10 MG tablet Take 1 tablet by mouth daily 17   Luis Deng MD   pravastatin (PRAVACHOL) 40 MG tablet Take 1 tablet by mouth every evening 17   Luis Deng, MD   traMADol Danielle Salvage) 50 MG tablet Take 2 tablets by mouth every 6 hours as needed for Pain 17   Luis Deng, MD   carvedilol (COREG) 12.5 MG tablet Take 1 tablet by mouth 2 times daily 17   Luis Deng MD   losartan (COZAAR) 100 MG tablet Take 1 tablet by mouth daily 17   Luis Deng MD   metFORMIN (GLUCOPHAGE) 500 MG tablet TAKE 1 TABLET BY MOUTH TWICE DAILY WITH MEALS 3/25/17   Luis Deng MD   mometasone-formoterol [facility]) 100-5 MCG/ACT inhaler Inhale 2 puffs into the lungs 2 times daily 16   Luis Deng, MD   HUMIRA PEN 40 MG/0.8ML injection  16   Historical Provider, MD   azaTHIOprine (IMURAN) 50 MG tablet  2/15/16   Historical Provider, MD   leflunomide (ARAVA) 10 MG tablet  6/25/15   Historical Provider, MD   doxepin (SINEQUAN) 25 MG capsule Take 1 capsule by mouth nightly. 3/19/15   Luis Deng MD   tamsulosin (FLOMAX) 0.4 MG capsule Take 1 capsule by mouth daily.  3/19/15   Luis Deng MD filed for this visit. BP Readings from Last 3 Encounters:   11/06/17 132/88   10/09/17 (!) 152/110   09/11/17 138/68       NPO Status:                                                                                 BMI:   Wt Readings from Last 3 Encounters:   11/06/17 297 lb (134.7 kg)   10/09/17 (!) 314 lb (142.4 kg)   09/11/17 (!) 316 lb (143.3 kg)     There is no height or weight on file to calculate BMI.    CBC:   Lab Results   Component Value Date    WBC 5.8 06/10/2017    RBC 4.67 06/10/2017    RBC 4.48 03/10/2012    HGB 14.8 06/10/2017    HCT 44.2 06/10/2017    MCV 94.6 06/10/2017    RDW 13.9 06/10/2017     06/10/2017       CMP:   Lab Results   Component Value Date     09/09/2017    K 4.7 09/09/2017     09/09/2017    CO2 22 09/09/2017    BUN 22 09/09/2017    CREATININE 0.98 09/09/2017    GFRAA >60.0 09/09/2017    LABGLOM >60.0 09/09/2017    GLUCOSE 153 09/09/2017    GLUCOSE 113 03/10/2012    PROT 7.6 09/09/2017    CALCIUM 9.4 09/09/2017    BILITOT 0.5 09/09/2017    ALKPHOS 43 09/09/2017    AST 18 09/09/2017    ALT 25 09/09/2017       POC Tests: No results for input(s): POCGLU, POCNA, POCK, POCCL, POCBUN, POCHEMO, POCHCT in the last 72 hours.     Coags: No results found for: PROTIME, INR, APTT    HCG (If Applicable): No results found for: PREGTESTUR, PREGSERUM, HCG, HCGQUANT     ABGs: No results found for: PHART, PO2ART, UPY3WNV, YOJ4JVX, BEART, P7MJQVJM     Type & Screen (If Applicable):  No results found for: LABABO, [address]    Anesthesia Evaluation  Patient summary reviewed and Nursing notes reviewed  Airway: Mallampati: II  TM distance: <3 FB   Neck ROM: full  Mouth opening: < 3 FB Dental:          Pulmonary:                              Cardiovascular:    (+) hypertension:,                   Neuro/Psych:               GI/Hepatic/Renal:   (+) GERD:, morbid obesity          Endo/Other:    (+) Type II DM, , hypothyroidism: arthritis:., .                 Abdominal:           Vascular:                                        Anesthesia Plan      MAC     ASA 3       Induction: intravenous. Anesthetic plan and risks discussed with patient.                       Oxana Garcia CRNA   11/15/2017

## 2017-12-08 DIAGNOSIS — I10 ESSENTIAL HYPERTENSION: Primary | ICD-10-CM

## 2017-12-08 DIAGNOSIS — E11.9 TYPE 2 DIABETES MELLITUS WITHOUT COMPLICATION, WITHOUT LONG-TERM CURRENT USE OF INSULIN (HCC): ICD-10-CM

## 2017-12-08 DIAGNOSIS — E03.9 ACQUIRED HYPOTHYROIDISM: ICD-10-CM

## 2017-12-08 DIAGNOSIS — E78.2 MIXED HYPERLIPIDEMIA: ICD-10-CM

## 2017-12-09 DIAGNOSIS — E78.2 MIXED HYPERLIPIDEMIA: ICD-10-CM

## 2017-12-09 DIAGNOSIS — I10 ESSENTIAL HYPERTENSION: ICD-10-CM

## 2017-12-09 DIAGNOSIS — E03.9 ACQUIRED HYPOTHYROIDISM: ICD-10-CM

## 2017-12-09 DIAGNOSIS — E11.9 TYPE 2 DIABETES MELLITUS WITHOUT COMPLICATION, WITHOUT LONG-TERM CURRENT USE OF INSULIN (HCC): ICD-10-CM

## 2017-12-09 LAB
ALBUMIN SERPL-MCNC: 4.4 G/DL (ref 3.9–4.9)
ALP BLD-CCNC: 45 U/L (ref 35–104)
ALT SERPL-CCNC: 25 U/L (ref 0–41)
ANION GAP SERPL CALCULATED.3IONS-SCNC: 17 MEQ/L (ref 7–13)
AST SERPL-CCNC: 29 U/L (ref 0–40)
BASOPHILS ABSOLUTE: 0 K/UL (ref 0–0.2)
BASOPHILS RELATIVE PERCENT: 0.7 %
BILIRUB SERPL-MCNC: 0.4 MG/DL (ref 0–1.2)
BUN BLDV-MCNC: 13 MG/DL (ref 6–20)
CALCIUM SERPL-MCNC: 9.2 MG/DL (ref 8.6–10.2)
CHLORIDE BLD-SCNC: 102 MEQ/L (ref 98–107)
CHOLESTEROL, TOTAL: 160 MG/DL (ref 0–199)
CO2: 23 MEQ/L (ref 22–29)
CREAT SERPL-MCNC: 1 MG/DL (ref 0.7–1.2)
EOSINOPHILS ABSOLUTE: 0.1 K/UL (ref 0–0.7)
EOSINOPHILS RELATIVE PERCENT: 2.1 %
GFR AFRICAN AMERICAN: >60
GFR NON-AFRICAN AMERICAN: >60
GLOBULIN: 2.6 G/DL (ref 2.3–3.5)
GLUCOSE BLD-MCNC: 134 MG/DL (ref 74–109)
HBA1C MFR BLD: 6.3 % (ref 4.8–5.9)
HCT VFR BLD CALC: 40.1 % (ref 42–52)
HDLC SERPL-MCNC: 40 MG/DL (ref 40–59)
HEMOGLOBIN: 13.3 G/DL (ref 14–18)
LDL CHOLESTEROL CALCULATED: 61 MG/DL (ref 0–129)
LYMPHOCYTES ABSOLUTE: 1.7 K/UL (ref 1–4.8)
LYMPHOCYTES RELATIVE PERCENT: 32.2 %
MCH RBC QN AUTO: 32.2 PG (ref 27–31.3)
MCHC RBC AUTO-ENTMCNC: 33.1 % (ref 33–37)
MCV RBC AUTO: 97.3 FL (ref 80–100)
MONOCYTES ABSOLUTE: 0.6 K/UL (ref 0.2–0.8)
MONOCYTES RELATIVE PERCENT: 10.6 %
NEUTROPHILS ABSOLUTE: 2.8 K/UL (ref 1.4–6.5)
NEUTROPHILS RELATIVE PERCENT: 54.4 %
PDW BLD-RTO: 14.1 % (ref 11.5–14.5)
PLATELET # BLD: 278 K/UL (ref 130–400)
POTASSIUM SERPL-SCNC: 4.5 MEQ/L (ref 3.5–5.1)
RBC # BLD: 4.12 M/UL (ref 4.7–6.1)
SODIUM BLD-SCNC: 142 MEQ/L (ref 132–144)
TOTAL PROTEIN: 7 G/DL (ref 6.4–8.1)
TRIGL SERPL-MCNC: 297 MG/DL (ref 0–200)
TSH SERPL DL<=0.05 MIU/L-ACNC: 1.35 UIU/ML (ref 0.27–4.2)
WBC # BLD: 5.2 K/UL (ref 4.8–10.8)

## 2017-12-12 ENCOUNTER — OFFICE VISIT (OUTPATIENT)
Dept: FAMILY MEDICINE CLINIC | Age: 52
End: 2017-12-12

## 2017-12-12 VITALS
BODY MASS INDEX: 43.19 KG/M2 | TEMPERATURE: 98.3 F | HEART RATE: 96 BPM | SYSTOLIC BLOOD PRESSURE: 124 MMHG | DIASTOLIC BLOOD PRESSURE: 86 MMHG | RESPIRATION RATE: 18 BRPM | WEIGHT: 301 LBS

## 2017-12-12 DIAGNOSIS — L29.9 ITCHING: ICD-10-CM

## 2017-12-12 DIAGNOSIS — E29.1 HYPOGONADISM MALE: ICD-10-CM

## 2017-12-12 DIAGNOSIS — B35.4 TINEA CORPORIS: ICD-10-CM

## 2017-12-12 DIAGNOSIS — N20.0 KIDNEY STONES: ICD-10-CM

## 2017-12-12 DIAGNOSIS — E11.9 TYPE 2 DIABETES MELLITUS WITHOUT COMPLICATION, WITHOUT LONG-TERM CURRENT USE OF INSULIN (HCC): Primary | ICD-10-CM

## 2017-12-12 DIAGNOSIS — M05.79 RHEUMATOID ARTHRITIS INVOLVING MULTIPLE SITES WITH POSITIVE RHEUMATOID FACTOR (HCC): ICD-10-CM

## 2017-12-12 PROCEDURE — 96372 THER/PROPH/DIAG INJ SC/IM: CPT | Performed by: FAMILY MEDICINE

## 2017-12-12 PROCEDURE — G8484 FLU IMMUNIZE NO ADMIN: HCPCS | Performed by: FAMILY MEDICINE

## 2017-12-12 PROCEDURE — 3044F HG A1C LEVEL LT 7.0%: CPT | Performed by: FAMILY MEDICINE

## 2017-12-12 PROCEDURE — 1036F TOBACCO NON-USER: CPT | Performed by: FAMILY MEDICINE

## 2017-12-12 PROCEDURE — G8427 DOCREV CUR MEDS BY ELIG CLIN: HCPCS | Performed by: FAMILY MEDICINE

## 2017-12-12 PROCEDURE — G8417 CALC BMI ABV UP PARAM F/U: HCPCS | Performed by: FAMILY MEDICINE

## 2017-12-12 PROCEDURE — 3017F COLORECTAL CA SCREEN DOC REV: CPT | Performed by: FAMILY MEDICINE

## 2017-12-12 PROCEDURE — 99214 OFFICE O/P EST MOD 30 MIN: CPT | Performed by: FAMILY MEDICINE

## 2017-12-12 RX ORDER — TESTOSTERONE CYPIONATE 200 MG/ML
500 INJECTION INTRAMUSCULAR ONCE
Status: COMPLETED | OUTPATIENT
Start: 2017-12-12 | End: 2017-12-12

## 2017-12-12 RX ORDER — TRAMADOL HYDROCHLORIDE 50 MG/1
100 TABLET ORAL EVERY 6 HOURS PRN
Qty: 540 TABLET | Refills: 0 | Status: SHIPPED | OUTPATIENT
Start: 2017-12-12 | End: 2018-03-12 | Stop reason: SDUPTHER

## 2017-12-12 RX ADMIN — TESTOSTERONE CYPIONATE 500 MG: 200 INJECTION INTRAMUSCULAR at 16:33

## 2017-12-12 NOTE — PROGRESS NOTES
Diabetes Mellitus Type 2: Current symptoms/problems include none. Home blood sugar records: patient does not test  Any episodes of hypoglycemia? N/A  Known diabetic complications: none  Itching, patient has a rash that itches occasionally and responds well to topical treatment. Tinea corporis appears intermittently and responds well to topical treatment that needs to be continued. Kidney stones remain present and asymptomatic. Patient was encouraged to drink lots of water. Rheumatoid arthritis involving multiple joints seems to be well-controlled with current doses of pain medicine. Hypogonadism is stable as long as we supplement. Patient is due for testosterone injection today which I will give. Blood pressure less than 131/81,   BP Readings from Last 1 Encounters:   12/12/17 124/86       Social history: This pt is not a smoker. This pt does not take an aspirin a day. Last eye exam was over a year  Last diabetic foot exam was 06/2017     Lab results for chronic conditions:    GFR Non- (no units)   Date Value   12/09/2017 >60.0   09/09/2017 >60.0   06/10/2017 >60.0     No results found for: GFR  Cholesterol, Total (mg/dL)   Date Value   12/09/2017 160   09/09/2017 180   06/10/2017 188     Triglycerides (mg/dL)   Date Value   12/09/2017 297 (H)   09/09/2017 317 (H)   06/10/2017 270 (H)     HDL (mg/dL)   Date Value   12/09/2017 40   09/09/2017 38 (L)   06/10/2017 45     LDL Calculated (mg/dL)   Date Value   12/09/2017 61   09/09/2017 79   06/10/2017 89     TSH (uIU/mL)   Date Value   12/09/2017 1.350   09/09/2017 2.540   06/10/2017 2.310     Hemoglobin A1C (%)   Date Value   12/09/2017 6.3 (H)   09/09/2017 6.8 (H)   06/10/2017 7.2 (H)     Microalbumin, Random Urine (mg/dL)   Date Value   06/12/2017 3.70 (H)       Review of Systems   HENT: Negative for congestion and trouble swallowing. Respiratory: Negative for cough, chest tightness and shortness of breath.     Cardiovascular: Negative for chest pain, palpitations and leg swelling. Gastrointestinal: Negative for abdominal pain, blood in stool, constipation, diarrhea, nausea and vomiting. Endocrine: Negative for cold intolerance and heat intolerance. Neurological: Negative for dizziness and light-headedness. Psychiatric/Behavioral: Negative for confusion. The patient is not nervous/anxious. Diabetes Counseling   Patient was counseled regarding disease risks and adopting healthy behaviors. Patient was provided education materials to assist with self management. Patient was provided log (or received log during previous visit) to record blood pressure, food intake and/or blood sugar. Patient was instructed to keep log up-to-date and to always bring log to all office visits. EXAM:  Constitutional Blood pressure 124/86, pulse 96, temperature 98.3 °F (36.8 °C), temperature source Temporal, resp. rate 18, weight (!) 301 lb (136.5 kg). Physical Exam   Constitutional: He appears well-developed and well-nourished. Neck: Normal range of motion. Neck supple. No thyromegaly present. Cardiovascular: Normal rate, regular rhythm and normal heart sounds. Pulmonary/Chest: Effort normal and breath sounds normal.   Abdominal: Bowel sounds are normal. He exhibits no distension. The patient is morbidly obese. Musculoskeletal:   There is no costovertebral angle tenderness. Lumbar spine and sacroiliac joints are non tender. There is no edema in the four extremities. Pulses palpable at both posterior tibial and radial arteries. Psychiatric: He has a normal mood and affect. His behavior is normal.     DIAGNOSIS:   1. Type 2 diabetes mellitus without complication, without long-term current use of insulin (HCC)      Well controlled, continue current medication. 2. Itching      symptomatic, treat   3. Tinea corporis      treatment needed   4. Kidney stones      asymptomatic, continue water intake.    5. Rheumatoid arthritis involving multiple sites with positive rheumatoid factor (HCC)  traMADol (ULTRAM) 50 MG tablet    Well controlled, continue current medications. 6. Hypogonadism male  testosterone cypionate (DEPOTESTOTERONE CYPIONATE) injection 500 mg    Symptomatic if not treated, patient due for injection today which I myself administer. Plan for follow up: Follow up in 1 month with blood work as ordered. Other follow up as needed.       Electronically signed by Juancho Heaton, 3:07 PM 12/17/17

## 2017-12-17 ASSESSMENT — ENCOUNTER SYMPTOMS
ABDOMINAL PAIN: 0
DIARRHEA: 0
CHEST TIGHTNESS: 0
NAUSEA: 0
BLOOD IN STOOL: 0
CONSTIPATION: 0
COUGH: 0
TROUBLE SWALLOWING: 0
SHORTNESS OF BREATH: 0
VOMITING: 0

## 2018-01-15 ENCOUNTER — OFFICE VISIT (OUTPATIENT)
Dept: FAMILY MEDICINE CLINIC | Age: 53
End: 2018-01-15

## 2018-01-15 VITALS
DIASTOLIC BLOOD PRESSURE: 82 MMHG | SYSTOLIC BLOOD PRESSURE: 136 MMHG | TEMPERATURE: 97.9 F | HEART RATE: 96 BPM | RESPIRATION RATE: 24 BRPM

## 2018-01-15 DIAGNOSIS — E29.1 HYPOGONADISM MALE: Primary | ICD-10-CM

## 2018-01-15 PROCEDURE — 3017F COLORECTAL CA SCREEN DOC REV: CPT | Performed by: FAMILY MEDICINE

## 2018-01-15 PROCEDURE — G8417 CALC BMI ABV UP PARAM F/U: HCPCS | Performed by: FAMILY MEDICINE

## 2018-01-15 PROCEDURE — 99213 OFFICE O/P EST LOW 20 MIN: CPT | Performed by: FAMILY MEDICINE

## 2018-01-15 PROCEDURE — 96372 THER/PROPH/DIAG INJ SC/IM: CPT | Performed by: FAMILY MEDICINE

## 2018-01-15 PROCEDURE — G8427 DOCREV CUR MEDS BY ELIG CLIN: HCPCS | Performed by: FAMILY MEDICINE

## 2018-01-15 PROCEDURE — G8484 FLU IMMUNIZE NO ADMIN: HCPCS | Performed by: FAMILY MEDICINE

## 2018-01-15 PROCEDURE — 1036F TOBACCO NON-USER: CPT | Performed by: FAMILY MEDICINE

## 2018-01-15 RX ORDER — TESTOSTERONE CYPIONATE 200 MG/ML
400 INJECTION INTRAMUSCULAR ONCE
Status: COMPLETED | OUTPATIENT
Start: 2018-01-15 | End: 2018-01-15

## 2018-01-15 RX ADMIN — TESTOSTERONE CYPIONATE 400 MG: 200 INJECTION INTRAMUSCULAR at 18:01

## 2018-01-15 NOTE — PROGRESS NOTES
Chief Complaint   Patient presents with    Hypogonadism       HPI: Robin Bird is a 45 y.o. male presenting for follow-up of hypogonadism. Insurance will only pay for 400 mg dose not the 500 mg dose we've been given him. Patient reports that he gets tired toward the end of the interval between testosterone shots, we will need to see how he does with the lower dose. He is here for testosterone injection. Per insurance they will only cover 400 mg. EXAM:  Constitutional Blood pressure 136/82, pulse 96, temperature 97.9 °F (36.6 °C), temperature source Temporal, resp. rate 24. Physical Exam   Cardiovascular: Normal rate and regular rhythm. Pulmonary/Chest: Effort normal and breath sounds normal. No respiratory distress. He has no wheezes. Abdominal: Soft. He exhibits no distension. This patient is morbidly obese. DIAGNOSIS:   1. Hypogonadism male  testosterone cypionate (DEPOTESTOTERONE CYPIONATE) injection 400 mg    Symptomatic, responds to injection, one provided by me today. Plan for follow up: Follow up in 1 month with blood work as ordered. Other follow up as needed.       Electronically signed by Jonathan Land, 9:34 PM 1/15/18

## 2018-01-15 NOTE — LETTER
Results for orders placed or performed in visit on 12/09/17   CBC Auto Differential   Result Value Ref Range    WBC 5.2 4.8 - 10.8 K/uL    RBC 4.12 (L) 4.70 - 6.10 M/uL    Hemoglobin 13.3 (L) 14.0 - 18.0 g/dL    Hematocrit 40.1 (L) 42.0 - 52.0 %    MCV 97.3 80.0 - 100.0 fL    MCH 32.2 (H) 27.0 - 31.3 pg    MCHC 33.1 33.0 - 37.0 %    RDW 14.1 11.5 - 14.5 %    Platelets 598 582 - 513 K/uL    Neutrophils % 54.4 %    Lymphocytes % 32.2 %    Monocytes % 10.6 %    Eosinophils % 2.1 %    Basophils % 0.7 %    Neutrophils # 2.8 1.4 - 6.5 K/uL    Lymphocytes # 1.7 1.0 - 4.8 K/uL    Monocytes # 0.6 0.2 - 0.8 K/uL    Eosinophils # 0.1 0.0 - 0.7 K/uL    Basophils # 0.0 0.0 - 0.2 K/uL   Comprehensive Metabolic Panel   Result Value Ref Range    Sodium 142 132 - 144 mEq/L    Potassium 4.5 3.5 - 5.1 mEq/L    Chloride 102 98 - 107 mEq/L    CO2 23 22 - 29 mEq/L    Anion Gap 17 (H) 7 - 13 mEq/L    Glucose 134 (H) 74 - 109 mg/dL    BUN 13 6 - 20 mg/dL    CREATININE 1.00 0.70 - 1.20 mg/dL    GFR Non-African American >60.0 >60    GFR  >60.0 >60    Calcium 9.2 8.6 - 10.2 mg/dL    Total Protein 7.0 6.4 - 8.1 g/dL    Alb 4.4 3.9 - 4.9 g/dL    Total Bilirubin 0.4 0.0 - 1.2 mg/dL    Alkaline Phosphatase 45 35 - 104 U/L    ALT 25 0 - 41 U/L    AST 29 0 - 40 U/L    Globulin 2.6 2.3 - 3.5 g/dL   Lipid Panel   Result Value Ref Range    Cholesterol, Total 160 0 - 199 mg/dL    Triglycerides 297 (H) 0 - 200 mg/dL    HDL 40 40 - 59 mg/dL    LDL Calculated 61 0 - 129 mg/dL   TSH without Reflex   Result Value Ref Range    TSH 1.350 0.270 - 4.200 uIU/mL   Hemoglobin A1C   Result Value Ref Range    Hemoglobin A1C 6.3 (H) 4.8 - 5.9 %

## 2018-02-12 ENCOUNTER — OFFICE VISIT (OUTPATIENT)
Dept: FAMILY MEDICINE CLINIC | Age: 53
End: 2018-02-12
Payer: COMMERCIAL

## 2018-02-12 VITALS
HEIGHT: 70 IN | SYSTOLIC BLOOD PRESSURE: 138 MMHG | DIASTOLIC BLOOD PRESSURE: 98 MMHG | RESPIRATION RATE: 12 BRPM | HEART RATE: 81 BPM | WEIGHT: 291.7 LBS | TEMPERATURE: 98.6 F | BODY MASS INDEX: 41.76 KG/M2

## 2018-02-12 DIAGNOSIS — E29.1 HYPOGONADISM MALE: ICD-10-CM

## 2018-02-12 DIAGNOSIS — M05.79 RHEUMATOID ARTHRITIS INVOLVING MULTIPLE SITES WITH POSITIVE RHEUMATOID FACTOR (HCC): Primary | ICD-10-CM

## 2018-02-12 DIAGNOSIS — Z11.4 SCREENING FOR HIV (HUMAN IMMUNODEFICIENCY VIRUS): ICD-10-CM

## 2018-02-12 DIAGNOSIS — R51.9 SINUS HEADACHE: ICD-10-CM

## 2018-02-12 DIAGNOSIS — Z11.59 ENCOUNTER FOR HEPATITIS C SCREENING TEST FOR LOW RISK PATIENT: ICD-10-CM

## 2018-02-12 PROCEDURE — 96372 THER/PROPH/DIAG INJ SC/IM: CPT | Performed by: FAMILY MEDICINE

## 2018-02-12 PROCEDURE — G8484 FLU IMMUNIZE NO ADMIN: HCPCS | Performed by: FAMILY MEDICINE

## 2018-02-12 PROCEDURE — G8417 CALC BMI ABV UP PARAM F/U: HCPCS | Performed by: FAMILY MEDICINE

## 2018-02-12 PROCEDURE — 3017F COLORECTAL CA SCREEN DOC REV: CPT | Performed by: FAMILY MEDICINE

## 2018-02-12 PROCEDURE — 1036F TOBACCO NON-USER: CPT | Performed by: FAMILY MEDICINE

## 2018-02-12 PROCEDURE — 99214 OFFICE O/P EST MOD 30 MIN: CPT | Performed by: FAMILY MEDICINE

## 2018-02-12 PROCEDURE — G8427 DOCREV CUR MEDS BY ELIG CLIN: HCPCS | Performed by: FAMILY MEDICINE

## 2018-02-12 RX ORDER — DULOXETIN HYDROCHLORIDE 30 MG/1
30 CAPSULE, DELAYED RELEASE ORAL DAILY
Qty: 46 CAPSULE | Refills: 0 | Status: SHIPPED | OUTPATIENT
Start: 2018-02-12 | End: 2018-03-12 | Stop reason: SDUPTHER

## 2018-02-12 RX ORDER — TESTOSTERONE CYPIONATE 200 MG/ML
400 INJECTION INTRAMUSCULAR ONCE
Status: COMPLETED | OUTPATIENT
Start: 2018-02-12 | End: 2018-02-12

## 2018-02-12 RX ADMIN — TESTOSTERONE CYPIONATE 400 MG: 200 INJECTION INTRAMUSCULAR at 19:26

## 2018-02-12 NOTE — PROGRESS NOTES
normal heart sounds. Pulmonary/Chest: Effort normal and breath sounds normal.   Abdominal: Bowel sounds are normal. He exhibits no distension. The patient is morbidly obese. Musculoskeletal:   There is no costovertebral angle tenderness. Lumbar spine and sacroiliac joints are non tender. There is no edema in the four extremities. Pulses palpable at both posterior tibial and radial arteries. Psychiatric: He has a normal mood and affect. His behavior is normal.     Testosterone injection given in the left gluteus muscle. DIAGNOSIS:   1. Rheumatoid arthritis involving multiple sites with positive rheumatoid factor (HCC)  DULoxetine (CYMBALTA) 30 MG extended release capsule   2. Hypogonadism male  testosterone cypionate (DEPOTESTOTERONE CYPIONATE) injection 400 mg   3. Sinus headache      Present, hopefully will improve with some passage of time. 4. Encounter for hepatitis C screening test for low risk patient  Hepatitis C Antibody   5. Screening for HIV (human immunodeficiency virus)  Hiv-1,-2 W/Reflex To Hiv-1 Western Blot     Plan for follow up: Follow up in 1 month with blood work as ordered. Other follow up as needed.       Electronically signed by Jun Montana, 5:43 PM 2/16/18

## 2018-03-09 DIAGNOSIS — E03.9 ACQUIRED HYPOTHYROIDISM: ICD-10-CM

## 2018-03-09 DIAGNOSIS — E78.2 MIXED HYPERLIPIDEMIA: ICD-10-CM

## 2018-03-09 DIAGNOSIS — E11.9 TYPE 2 DIABETES MELLITUS WITHOUT COMPLICATION, WITHOUT LONG-TERM CURRENT USE OF INSULIN (HCC): Primary | ICD-10-CM

## 2018-03-10 DIAGNOSIS — E03.9 ACQUIRED HYPOTHYROIDISM: ICD-10-CM

## 2018-03-10 DIAGNOSIS — E78.2 MIXED HYPERLIPIDEMIA: ICD-10-CM

## 2018-03-10 DIAGNOSIS — Z11.59 ENCOUNTER FOR HEPATITIS C SCREENING TEST FOR LOW RISK PATIENT: ICD-10-CM

## 2018-03-10 DIAGNOSIS — Z11.4 SCREENING FOR HIV (HUMAN IMMUNODEFICIENCY VIRUS): ICD-10-CM

## 2018-03-10 DIAGNOSIS — E11.9 TYPE 2 DIABETES MELLITUS WITHOUT COMPLICATION, WITHOUT LONG-TERM CURRENT USE OF INSULIN (HCC): ICD-10-CM

## 2018-03-10 LAB
ALBUMIN SERPL-MCNC: 4.3 G/DL (ref 3.9–4.9)
ALP BLD-CCNC: 46 U/L (ref 35–104)
ALT SERPL-CCNC: 24 U/L (ref 0–41)
ANION GAP SERPL CALCULATED.3IONS-SCNC: 15 MEQ/L (ref 7–13)
AST SERPL-CCNC: 17 U/L (ref 0–40)
BILIRUB SERPL-MCNC: 0.4 MG/DL (ref 0–1.2)
BUN BLDV-MCNC: 14 MG/DL (ref 6–20)
CALCIUM SERPL-MCNC: 8.8 MG/DL (ref 8.6–10.2)
CHLORIDE BLD-SCNC: 104 MEQ/L (ref 98–107)
CHOLESTEROL, TOTAL: 151 MG/DL (ref 0–199)
CO2: 21 MEQ/L (ref 22–29)
CREAT SERPL-MCNC: 1.03 MG/DL (ref 0.7–1.2)
GFR AFRICAN AMERICAN: >60
GFR NON-AFRICAN AMERICAN: >60
GLOBULIN: 2.7 G/DL (ref 2.3–3.5)
GLUCOSE BLD-MCNC: 138 MG/DL (ref 74–109)
HBA1C MFR BLD: 7 % (ref 4.8–5.9)
HDLC SERPL-MCNC: 38 MG/DL (ref 40–59)
HEPATITIS C ANTIBODY INTERPRETATION: NORMAL
LDL CHOLESTEROL CALCULATED: 75 MG/DL (ref 0–129)
POTASSIUM SERPL-SCNC: 4.9 MEQ/L (ref 3.5–5.1)
SODIUM BLD-SCNC: 140 MEQ/L (ref 132–144)
TOTAL PROTEIN: 7 G/DL (ref 6.4–8.1)
TRIGL SERPL-MCNC: 192 MG/DL (ref 0–200)
TSH SERPL DL<=0.05 MIU/L-ACNC: 1.54 UIU/ML (ref 0.27–4.2)

## 2018-03-12 ENCOUNTER — OFFICE VISIT (OUTPATIENT)
Dept: FAMILY MEDICINE CLINIC | Age: 53
End: 2018-03-12
Payer: COMMERCIAL

## 2018-03-12 VITALS
SYSTOLIC BLOOD PRESSURE: 136 MMHG | RESPIRATION RATE: 16 BRPM | TEMPERATURE: 98.7 F | HEART RATE: 75 BPM | HEIGHT: 70 IN | DIASTOLIC BLOOD PRESSURE: 80 MMHG | BODY MASS INDEX: 41.63 KG/M2 | WEIGHT: 290.8 LBS

## 2018-03-12 DIAGNOSIS — M05.79 RHEUMATOID ARTHRITIS INVOLVING MULTIPLE SITES WITH POSITIVE RHEUMATOID FACTOR (HCC): ICD-10-CM

## 2018-03-12 DIAGNOSIS — E11.9 TYPE 2 DIABETES MELLITUS WITHOUT COMPLICATION, UNSPECIFIED LONG TERM INSULIN USE STATUS: Primary | ICD-10-CM

## 2018-03-12 DIAGNOSIS — I10 ESSENTIAL HYPERTENSION: ICD-10-CM

## 2018-03-12 DIAGNOSIS — E29.1 HYPOGONADISM MALE: ICD-10-CM

## 2018-03-12 DIAGNOSIS — E78.2 MIXED HYPERLIPIDEMIA: ICD-10-CM

## 2018-03-12 LAB — HIV-1 AND HIV-2 ANTIBODIES: NEGATIVE

## 2018-03-12 PROCEDURE — G8427 DOCREV CUR MEDS BY ELIG CLIN: HCPCS | Performed by: FAMILY MEDICINE

## 2018-03-12 PROCEDURE — G8417 CALC BMI ABV UP PARAM F/U: HCPCS | Performed by: FAMILY MEDICINE

## 2018-03-12 PROCEDURE — 3017F COLORECTAL CA SCREEN DOC REV: CPT | Performed by: FAMILY MEDICINE

## 2018-03-12 PROCEDURE — G8484 FLU IMMUNIZE NO ADMIN: HCPCS | Performed by: FAMILY MEDICINE

## 2018-03-12 PROCEDURE — 3045F PR MOST RECENT HEMOGLOBIN A1C LEVEL 7.0-9.0%: CPT | Performed by: FAMILY MEDICINE

## 2018-03-12 PROCEDURE — 96372 THER/PROPH/DIAG INJ SC/IM: CPT | Performed by: FAMILY MEDICINE

## 2018-03-12 PROCEDURE — 1036F TOBACCO NON-USER: CPT | Performed by: FAMILY MEDICINE

## 2018-03-12 PROCEDURE — 99214 OFFICE O/P EST MOD 30 MIN: CPT | Performed by: FAMILY MEDICINE

## 2018-03-12 RX ORDER — TESTOSTERONE CYPIONATE 200 MG/ML
400 INJECTION INTRAMUSCULAR ONCE
Status: COMPLETED | OUTPATIENT
Start: 2018-03-12 | End: 2018-03-12

## 2018-03-12 RX ORDER — TRAMADOL HYDROCHLORIDE 50 MG/1
100 TABLET ORAL 2 TIMES DAILY PRN
Qty: 180 TABLET | Refills: 0 | Status: SHIPPED | OUTPATIENT
Start: 2018-03-12 | End: 2018-06-10

## 2018-03-12 RX ORDER — LOSARTAN POTASSIUM 100 MG/1
100 TABLET ORAL DAILY
Qty: 90 TABLET | Refills: 3 | Status: SHIPPED | OUTPATIENT
Start: 2018-03-12 | End: 2019-03-20 | Stop reason: SDUPTHER

## 2018-03-12 RX ORDER — DULOXETIN HYDROCHLORIDE 60 MG/1
60 CAPSULE, DELAYED RELEASE ORAL DAILY
Qty: 90 CAPSULE | Refills: 3 | Status: SHIPPED | OUTPATIENT
Start: 2018-03-12 | End: 2019-03-20 | Stop reason: SDUPTHER

## 2018-03-12 RX ADMIN — TESTOSTERONE CYPIONATE 400 MG: 200 INJECTION INTRAMUSCULAR at 18:11

## 2018-03-12 ASSESSMENT — PATIENT HEALTH QUESTIONNAIRE - PHQ9
2. FEELING DOWN, DEPRESSED OR HOPELESS: 0
1. LITTLE INTEREST OR PLEASURE IN DOING THINGS: 0
SUM OF ALL RESPONSES TO PHQ9 QUESTIONS 1 & 2: 0
SUM OF ALL RESPONSES TO PHQ QUESTIONS 1-9: 0

## 2018-03-12 NOTE — PROGRESS NOTES
Diabetes Mellitus Type 2: Current symptoms/problems include none. Home blood sugar records: patient does not test  Any episodes of hypoglycemia? n/a  Known diabetic complications: none. Rheumatoid arthritis is present causing pain although patient is off of the Percocets at least.  Hypertension is well-controlled with low-salt diet and current medication. Hyperlipidemia is well controlled with diet and current medicine. He has no side effects. The patient has hypogonadism. Patient is also here for is testosterone injection. Blood pressure less than 131/81,   BP Readings from Last 1 Encounters:   03/12/18 136/80       Social history: This pt is not a smoker. This pt does not take an aspirin a day. Last eye exam was a year ago  Last diabetic foot exam was TODAY   Lab results for chronic conditions:    GFR Non- (no units)   Date Value   03/10/2018 >60.0   12/09/2017 >60.0   09/09/2017 >60.0     No results found for: GFR  Cholesterol, Total (mg/dL)   Date Value   03/10/2018 151   12/09/2017 160   09/09/2017 180     Triglycerides (mg/dL)   Date Value   03/10/2018 192   12/09/2017 297 (H)   09/09/2017 317 (H)     HDL (mg/dL)   Date Value   03/10/2018 38 (L)   12/09/2017 40   09/09/2017 38 (L)     LDL Calculated (mg/dL)   Date Value   03/10/2018 75   12/09/2017 61   09/09/2017 79     TSH (uIU/mL)   Date Value   03/10/2018 1.540   12/09/2017 1.350   09/09/2017 2.540     Hemoglobin A1C (%)   Date Value   03/10/2018 7.0 (H)   12/09/2017 6.3 (H)   09/09/2017 6.8 (H)     Microalbumin, Random Urine (mg/dL)   Date Value   06/12/2017 3.70 (H)     Review of Systems   HENT: Negative for congestion and trouble swallowing. Respiratory: Negative for cough, chest tightness and shortness of breath. Cardiovascular: Negative for chest pain, palpitations and leg swelling. Gastrointestinal: Negative for abdominal pain, blood in stool, constipation, diarrhea, nausea and vomiting.    Endocrine: Negative

## 2018-03-16 ASSESSMENT — ENCOUNTER SYMPTOMS
SHORTNESS OF BREATH: 0
COUGH: 0
ABDOMINAL PAIN: 0
CHEST TIGHTNESS: 0
TROUBLE SWALLOWING: 0
VOMITING: 0
NAUSEA: 0
BACK PAIN: 1
BLOOD IN STOOL: 0
DIARRHEA: 0
CONSTIPATION: 0

## 2018-04-12 ENCOUNTER — OFFICE VISIT (OUTPATIENT)
Dept: FAMILY MEDICINE CLINIC | Age: 53
End: 2018-04-12
Payer: COMMERCIAL

## 2018-04-12 VITALS
BODY MASS INDEX: 40.29 KG/M2 | RESPIRATION RATE: 10 BRPM | TEMPERATURE: 96.5 F | WEIGHT: 281.4 LBS | OXYGEN SATURATION: 95 % | HEIGHT: 70 IN | SYSTOLIC BLOOD PRESSURE: 134 MMHG | HEART RATE: 88 BPM | DIASTOLIC BLOOD PRESSURE: 84 MMHG

## 2018-04-12 DIAGNOSIS — E29.1 HYPOGONADISM MALE: Primary | ICD-10-CM

## 2018-04-12 DIAGNOSIS — J20.9 ACUTE BRONCHITIS, UNSPECIFIED ORGANISM: ICD-10-CM

## 2018-04-12 PROCEDURE — 96372 THER/PROPH/DIAG INJ SC/IM: CPT | Performed by: FAMILY MEDICINE

## 2018-04-12 PROCEDURE — G8427 DOCREV CUR MEDS BY ELIG CLIN: HCPCS | Performed by: FAMILY MEDICINE

## 2018-04-12 PROCEDURE — G8417 CALC BMI ABV UP PARAM F/U: HCPCS | Performed by: FAMILY MEDICINE

## 2018-04-12 PROCEDURE — 99213 OFFICE O/P EST LOW 20 MIN: CPT | Performed by: FAMILY MEDICINE

## 2018-04-12 PROCEDURE — 3017F COLORECTAL CA SCREEN DOC REV: CPT | Performed by: FAMILY MEDICINE

## 2018-04-12 PROCEDURE — 1036F TOBACCO NON-USER: CPT | Performed by: FAMILY MEDICINE

## 2018-04-12 RX ORDER — TESTOSTERONE CYPIONATE 200 MG/ML
400 INJECTION INTRAMUSCULAR ONCE
Status: COMPLETED | OUTPATIENT
Start: 2018-04-12 | End: 2018-04-12

## 2018-04-12 RX ORDER — AZITHROMYCIN 250 MG/1
TABLET, FILM COATED ORAL
Qty: 6 TABLET | Refills: 0 | Status: SHIPPED | OUTPATIENT
Start: 2018-04-12 | End: 2018-04-22

## 2018-04-12 RX ADMIN — TESTOSTERONE CYPIONATE 400 MG: 200 INJECTION INTRAMUSCULAR at 15:37

## 2018-04-12 ASSESSMENT — PATIENT HEALTH QUESTIONNAIRE - PHQ9
SUM OF ALL RESPONSES TO PHQ9 QUESTIONS 1 & 2: 0
SUM OF ALL RESPONSES TO PHQ QUESTIONS 1-9: 0
1. LITTLE INTEREST OR PLEASURE IN DOING THINGS: 0
2. FEELING DOWN, DEPRESSED OR HOPELESS: 0

## 2018-05-09 DIAGNOSIS — D50.0 BLOOD LOSS ANEMIA: ICD-10-CM

## 2018-05-09 DIAGNOSIS — I10 ESSENTIAL HYPERTENSION: ICD-10-CM

## 2018-05-09 DIAGNOSIS — I10 ESSENTIAL HYPERTENSION: Primary | ICD-10-CM

## 2018-05-09 LAB
ALBUMIN SERPL-MCNC: 4.1 G/DL (ref 3.9–4.9)
ALP BLD-CCNC: 48 U/L (ref 35–104)
ALT SERPL-CCNC: 20 U/L (ref 0–41)
ANION GAP SERPL CALCULATED.3IONS-SCNC: 17 MEQ/L (ref 7–13)
AST SERPL-CCNC: 18 U/L (ref 0–40)
BASOPHILS ABSOLUTE: 0 K/UL (ref 0–0.2)
BASOPHILS RELATIVE PERCENT: 0.5 %
BILIRUB SERPL-MCNC: <0.2 MG/DL (ref 0–1.2)
BUN BLDV-MCNC: 24 MG/DL (ref 6–20)
CALCIUM SERPL-MCNC: 8.6 MG/DL (ref 8.6–10.2)
CHLORIDE BLD-SCNC: 103 MEQ/L (ref 98–107)
CO2: 22 MEQ/L (ref 22–29)
CREAT SERPL-MCNC: 1.4 MG/DL (ref 0.7–1.2)
EOSINOPHILS ABSOLUTE: 0.1 K/UL (ref 0–0.7)
EOSINOPHILS RELATIVE PERCENT: 1.6 %
FERRITIN: 18 NG/ML (ref 30–400)
GFR AFRICAN AMERICAN: >60
GFR NON-AFRICAN AMERICAN: 52.6
GLOBULIN: 2.4 G/DL (ref 2.3–3.5)
GLUCOSE BLD-MCNC: 148 MG/DL (ref 74–109)
HCT VFR BLD CALC: 27.6 % (ref 42–52)
HEMOGLOBIN: 9.7 G/DL (ref 14–18)
LYMPHOCYTES ABSOLUTE: 2.4 K/UL (ref 1–4.8)
LYMPHOCYTES RELATIVE PERCENT: 37.5 %
MCH RBC QN AUTO: 33.6 PG (ref 27–31.3)
MCHC RBC AUTO-ENTMCNC: 35.3 % (ref 33–37)
MCV RBC AUTO: 95.4 FL (ref 80–100)
MONOCYTES ABSOLUTE: 0.6 K/UL (ref 0.2–0.8)
MONOCYTES RELATIVE PERCENT: 9.7 %
NEUTROPHILS ABSOLUTE: 3.2 K/UL (ref 1.4–6.5)
NEUTROPHILS RELATIVE PERCENT: 50.7 %
PDW BLD-RTO: 15.7 % (ref 11.5–14.5)
PLATELET # BLD: 404 K/UL (ref 130–400)
POTASSIUM SERPL-SCNC: 4.8 MEQ/L (ref 3.5–5.1)
RBC # BLD: 2.89 M/UL (ref 4.7–6.1)
SODIUM BLD-SCNC: 142 MEQ/L (ref 132–144)
TOTAL PROTEIN: 6.5 G/DL (ref 6.4–8.1)
WBC # BLD: 6.3 K/UL (ref 4.8–10.8)

## 2018-05-14 ENCOUNTER — OFFICE VISIT (OUTPATIENT)
Dept: FAMILY MEDICINE CLINIC | Age: 53
End: 2018-05-14
Payer: COMMERCIAL

## 2018-05-14 VITALS
HEART RATE: 102 BPM | TEMPERATURE: 98.2 F | SYSTOLIC BLOOD PRESSURE: 132 MMHG | RESPIRATION RATE: 12 BRPM | OXYGEN SATURATION: 96 % | BODY MASS INDEX: 41.86 KG/M2 | WEIGHT: 292.4 LBS | DIASTOLIC BLOOD PRESSURE: 76 MMHG | HEIGHT: 70 IN

## 2018-05-14 DIAGNOSIS — D50.0 BLOOD LOSS ANEMIA: ICD-10-CM

## 2018-05-14 DIAGNOSIS — M05.79 RHEUMATOID ARTHRITIS INVOLVING MULTIPLE SITES WITH POSITIVE RHEUMATOID FACTOR (HCC): ICD-10-CM

## 2018-05-14 DIAGNOSIS — E29.1 HYPOGONADISM MALE: Primary | ICD-10-CM

## 2018-05-14 PROCEDURE — 1036F TOBACCO NON-USER: CPT | Performed by: FAMILY MEDICINE

## 2018-05-14 PROCEDURE — G8417 CALC BMI ABV UP PARAM F/U: HCPCS | Performed by: FAMILY MEDICINE

## 2018-05-14 PROCEDURE — 3017F COLORECTAL CA SCREEN DOC REV: CPT | Performed by: FAMILY MEDICINE

## 2018-05-14 PROCEDURE — G8427 DOCREV CUR MEDS BY ELIG CLIN: HCPCS | Performed by: FAMILY MEDICINE

## 2018-05-14 PROCEDURE — 99213 OFFICE O/P EST LOW 20 MIN: CPT | Performed by: FAMILY MEDICINE

## 2018-05-14 PROCEDURE — 96372 THER/PROPH/DIAG INJ SC/IM: CPT | Performed by: FAMILY MEDICINE

## 2018-05-14 RX ORDER — METHYLPREDNISOLONE 4 MG/1
TABLET ORAL
Qty: 21 TABLET | Refills: 2 | Status: SHIPPED | OUTPATIENT
Start: 2018-05-14 | End: 2018-05-20

## 2018-05-14 RX ORDER — PANTOPRAZOLE SODIUM 40 MG/1
TABLET, DELAYED RELEASE ORAL
Refills: 0 | COMMUNITY
Start: 2018-05-02 | End: 2018-06-14 | Stop reason: SDUPTHER

## 2018-05-14 RX ORDER — TESTOSTERONE CYPIONATE 200 MG/ML
400 INJECTION INTRAMUSCULAR ONCE
Status: COMPLETED | OUTPATIENT
Start: 2018-05-14 | End: 2018-05-14

## 2018-05-14 RX ADMIN — TESTOSTERONE CYPIONATE 400 MG: 200 INJECTION INTRAMUSCULAR at 17:34

## 2018-05-14 ASSESSMENT — PATIENT HEALTH QUESTIONNAIRE - PHQ9
2. FEELING DOWN, DEPRESSED OR HOPELESS: 0
SUM OF ALL RESPONSES TO PHQ QUESTIONS 1-9: 0
1. LITTLE INTEREST OR PLEASURE IN DOING THINGS: 0
SUM OF ALL RESPONSES TO PHQ9 QUESTIONS 1 & 2: 0

## 2018-06-14 ENCOUNTER — OFFICE VISIT (OUTPATIENT)
Dept: FAMILY MEDICINE CLINIC | Age: 53
End: 2018-06-14
Payer: COMMERCIAL

## 2018-06-14 VITALS
DIASTOLIC BLOOD PRESSURE: 82 MMHG | BODY MASS INDEX: 41.86 KG/M2 | OXYGEN SATURATION: 95 % | TEMPERATURE: 98 F | RESPIRATION RATE: 12 BRPM | SYSTOLIC BLOOD PRESSURE: 130 MMHG | HEIGHT: 70 IN | HEART RATE: 96 BPM | WEIGHT: 292.4 LBS

## 2018-06-14 DIAGNOSIS — K21.9 GASTROESOPHAGEAL REFLUX DISEASE, ESOPHAGITIS PRESENCE NOT SPECIFIED: ICD-10-CM

## 2018-06-14 DIAGNOSIS — E29.1 HYPOGONADISM MALE: Primary | ICD-10-CM

## 2018-06-14 DIAGNOSIS — E03.9 ACQUIRED HYPOTHYROIDISM: ICD-10-CM

## 2018-06-14 PROCEDURE — 3017F COLORECTAL CA SCREEN DOC REV: CPT | Performed by: FAMILY MEDICINE

## 2018-06-14 PROCEDURE — G8427 DOCREV CUR MEDS BY ELIG CLIN: HCPCS | Performed by: FAMILY MEDICINE

## 2018-06-14 PROCEDURE — 1036F TOBACCO NON-USER: CPT | Performed by: FAMILY MEDICINE

## 2018-06-14 PROCEDURE — G8417 CALC BMI ABV UP PARAM F/U: HCPCS | Performed by: FAMILY MEDICINE

## 2018-06-14 PROCEDURE — 99213 OFFICE O/P EST LOW 20 MIN: CPT | Performed by: FAMILY MEDICINE

## 2018-06-14 RX ORDER — PANTOPRAZOLE SODIUM 40 MG/1
TABLET, DELAYED RELEASE ORAL
Qty: 90 TABLET | Refills: 3 | Status: SHIPPED | OUTPATIENT
Start: 2018-06-14 | End: 2019-03-20 | Stop reason: SDUPTHER

## 2018-06-14 ASSESSMENT — PATIENT HEALTH QUESTIONNAIRE - PHQ9
SUM OF ALL RESPONSES TO PHQ9 QUESTIONS 1 & 2: 0
SUM OF ALL RESPONSES TO PHQ QUESTIONS 1-9: 0
2. FEELING DOWN, DEPRESSED OR HOPELESS: 0
1. LITTLE INTEREST OR PLEASURE IN DOING THINGS: 0

## 2018-06-16 RX ORDER — TESTOSTERONE CYPIONATE 200 MG/ML
400 INJECTION INTRAMUSCULAR ONCE
Status: DISCONTINUED | OUTPATIENT
Start: 2018-06-16 | End: 2018-08-16

## 2018-07-12 ENCOUNTER — OFFICE VISIT (OUTPATIENT)
Dept: FAMILY MEDICINE CLINIC | Age: 53
End: 2018-07-12
Payer: COMMERCIAL

## 2018-07-12 VITALS
WEIGHT: 294 LBS | TEMPERATURE: 99 F | SYSTOLIC BLOOD PRESSURE: 136 MMHG | BODY MASS INDEX: 42.09 KG/M2 | HEART RATE: 94 BPM | DIASTOLIC BLOOD PRESSURE: 84 MMHG | HEIGHT: 70 IN | RESPIRATION RATE: 12 BRPM | OXYGEN SATURATION: 96 %

## 2018-07-12 DIAGNOSIS — R20.9 SKIN SENSATION DISTURBANCE: ICD-10-CM

## 2018-07-12 DIAGNOSIS — E29.1 HYPOGONADISM MALE: Primary | ICD-10-CM

## 2018-07-12 DIAGNOSIS — E78.2 MIXED HYPERLIPIDEMIA: ICD-10-CM

## 2018-07-12 DIAGNOSIS — I10 ESSENTIAL HYPERTENSION: ICD-10-CM

## 2018-07-12 DIAGNOSIS — L82.1 SEBORRHEIC KERATOSES: ICD-10-CM

## 2018-07-12 DIAGNOSIS — E03.9 ACQUIRED HYPOTHYROIDISM: ICD-10-CM

## 2018-07-12 PROCEDURE — G8417 CALC BMI ABV UP PARAM F/U: HCPCS | Performed by: FAMILY MEDICINE

## 2018-07-12 PROCEDURE — 1036F TOBACCO NON-USER: CPT | Performed by: FAMILY MEDICINE

## 2018-07-12 PROCEDURE — 17110 DESTRUCTION B9 LES UP TO 14: CPT | Performed by: FAMILY MEDICINE

## 2018-07-12 PROCEDURE — G8427 DOCREV CUR MEDS BY ELIG CLIN: HCPCS | Performed by: FAMILY MEDICINE

## 2018-07-12 PROCEDURE — 99212 OFFICE O/P EST SF 10 MIN: CPT | Performed by: FAMILY MEDICINE

## 2018-07-12 PROCEDURE — 11301 SHAVE SKIN LESION 0.6-1.0 CM: CPT | Performed by: FAMILY MEDICINE

## 2018-07-12 PROCEDURE — 3017F COLORECTAL CA SCREEN DOC REV: CPT | Performed by: FAMILY MEDICINE

## 2018-07-12 RX ORDER — CARVEDILOL 12.5 MG/1
12.5 TABLET ORAL 2 TIMES DAILY
Qty: 180 TABLET | Refills: 3 | Status: SHIPPED | OUTPATIENT
Start: 2018-07-12 | End: 2019-03-20 | Stop reason: SDUPTHER

## 2018-07-13 DIAGNOSIS — R20.9 SKIN SENSATION DISTURBANCE: ICD-10-CM

## 2018-07-13 DIAGNOSIS — L82.1 SEBORRHEIC KERATOSES: ICD-10-CM

## 2018-08-03 DIAGNOSIS — E03.9 ACQUIRED HYPOTHYROIDISM: ICD-10-CM

## 2018-08-04 RX ORDER — LEVOTHYROXINE SODIUM 75 MCG
TABLET ORAL
Qty: 90 TABLET | Refills: 3 | Status: SHIPPED | OUTPATIENT
Start: 2018-08-04 | End: 2019-03-20 | Stop reason: SDUPTHER

## 2018-08-11 DIAGNOSIS — E29.1 HYPOGONADISM MALE: ICD-10-CM

## 2018-08-11 DIAGNOSIS — E78.2 MIXED HYPERLIPIDEMIA: ICD-10-CM

## 2018-08-11 DIAGNOSIS — I10 ESSENTIAL HYPERTENSION: ICD-10-CM

## 2018-08-11 DIAGNOSIS — E03.9 ACQUIRED HYPOTHYROIDISM: ICD-10-CM

## 2018-08-11 LAB
ALBUMIN SERPL-MCNC: 4.3 G/DL (ref 3.9–4.9)
ALP BLD-CCNC: 43 U/L (ref 35–104)
ALT SERPL-CCNC: 30 U/L (ref 0–41)
ANION GAP SERPL CALCULATED.3IONS-SCNC: 14 MEQ/L (ref 7–13)
AST SERPL-CCNC: 28 U/L (ref 0–40)
BASOPHILS ABSOLUTE: 0 K/UL (ref 0–0.2)
BASOPHILS RELATIVE PERCENT: 0.6 %
BILIRUB SERPL-MCNC: 0.4 MG/DL (ref 0–1.2)
BUN BLDV-MCNC: 18 MG/DL (ref 6–20)
CALCIUM SERPL-MCNC: 8.9 MG/DL (ref 8.6–10.2)
CHLORIDE BLD-SCNC: 101 MEQ/L (ref 98–107)
CHOLESTEROL, TOTAL: 172 MG/DL (ref 0–199)
CO2: 25 MEQ/L (ref 22–29)
CREAT SERPL-MCNC: 1.05 MG/DL (ref 0.7–1.2)
EOSINOPHILS ABSOLUTE: 0.1 K/UL (ref 0–0.7)
EOSINOPHILS RELATIVE PERCENT: 1.7 %
GFR AFRICAN AMERICAN: >60
GFR NON-AFRICAN AMERICAN: >60
GLOBULIN: 3.1 G/DL (ref 2.3–3.5)
GLUCOSE BLD-MCNC: 144 MG/DL (ref 74–109)
HCT VFR BLD CALC: 41.5 % (ref 42–52)
HDLC SERPL-MCNC: 42 MG/DL (ref 40–59)
HEMOGLOBIN: 14.1 G/DL (ref 14–18)
LDL CHOLESTEROL CALCULATED: 85 MG/DL (ref 0–129)
LYMPHOCYTES ABSOLUTE: 1.4 K/UL (ref 1–4.8)
LYMPHOCYTES RELATIVE PERCENT: 30.5 %
MCH RBC QN AUTO: 30.1 PG (ref 27–31.3)
MCHC RBC AUTO-ENTMCNC: 33.9 % (ref 33–37)
MCV RBC AUTO: 88.8 FL (ref 80–100)
MONOCYTES ABSOLUTE: 0.5 K/UL (ref 0.2–0.8)
MONOCYTES RELATIVE PERCENT: 10.1 %
NEUTROPHILS ABSOLUTE: 2.7 K/UL (ref 1.4–6.5)
NEUTROPHILS RELATIVE PERCENT: 57.1 %
PDW BLD-RTO: 16 % (ref 11.5–14.5)
PLATELET # BLD: 256 K/UL (ref 130–400)
POTASSIUM SERPL-SCNC: 4.5 MEQ/L (ref 3.5–5.1)
PROSTATE SPECIFIC ANTIGEN: 0.83 NG/ML (ref 0–3.89)
RBC # BLD: 4.67 M/UL (ref 4.7–6.1)
SODIUM BLD-SCNC: 140 MEQ/L (ref 132–144)
TOTAL PROTEIN: 7.4 G/DL (ref 6.4–8.1)
TRIGL SERPL-MCNC: 224 MG/DL (ref 0–200)
TSH SERPL DL<=0.05 MIU/L-ACNC: 1.15 UIU/ML (ref 0.27–4.2)
WBC # BLD: 4.6 K/UL (ref 4.8–10.8)

## 2018-08-13 LAB — TESTOSTERONE TOTAL-MALE: 54 NG/DL (ref 300–890)

## 2018-08-16 ENCOUNTER — OFFICE VISIT (OUTPATIENT)
Dept: FAMILY MEDICINE CLINIC | Age: 53
End: 2018-08-16
Payer: COMMERCIAL

## 2018-08-16 VITALS
BODY MASS INDEX: 42.43 KG/M2 | DIASTOLIC BLOOD PRESSURE: 68 MMHG | TEMPERATURE: 98 F | SYSTOLIC BLOOD PRESSURE: 130 MMHG | OXYGEN SATURATION: 94 % | HEART RATE: 99 BPM | HEIGHT: 70 IN | RESPIRATION RATE: 12 BRPM | WEIGHT: 296.4 LBS

## 2018-08-16 DIAGNOSIS — E78.2 MIXED HYPERLIPIDEMIA: ICD-10-CM

## 2018-08-16 DIAGNOSIS — M05.79 RHEUMATOID ARTHRITIS INVOLVING MULTIPLE SITES WITH POSITIVE RHEUMATOID FACTOR (HCC): ICD-10-CM

## 2018-08-16 DIAGNOSIS — I10 ESSENTIAL HYPERTENSION: ICD-10-CM

## 2018-08-16 DIAGNOSIS — E11.9 TYPE 2 DIABETES MELLITUS WITHOUT COMPLICATION, WITHOUT LONG-TERM CURRENT USE OF INSULIN (HCC): Primary | ICD-10-CM

## 2018-08-16 DIAGNOSIS — E29.1 HYPOGONADISM MALE: ICD-10-CM

## 2018-08-16 PROCEDURE — G8427 DOCREV CUR MEDS BY ELIG CLIN: HCPCS | Performed by: FAMILY MEDICINE

## 2018-08-16 PROCEDURE — 83036 HEMOGLOBIN GLYCOSYLATED A1C: CPT | Performed by: FAMILY MEDICINE

## 2018-08-16 PROCEDURE — 2022F DILAT RTA XM EVC RTNOPTHY: CPT | Performed by: FAMILY MEDICINE

## 2018-08-16 PROCEDURE — 99214 OFFICE O/P EST MOD 30 MIN: CPT | Performed by: FAMILY MEDICINE

## 2018-08-16 PROCEDURE — 96372 THER/PROPH/DIAG INJ SC/IM: CPT | Performed by: FAMILY MEDICINE

## 2018-08-16 PROCEDURE — 3045F PR MOST RECENT HEMOGLOBIN A1C LEVEL 7.0-9.0%: CPT | Performed by: FAMILY MEDICINE

## 2018-08-16 PROCEDURE — G8417 CALC BMI ABV UP PARAM F/U: HCPCS | Performed by: FAMILY MEDICINE

## 2018-08-16 PROCEDURE — 3017F COLORECTAL CA SCREEN DOC REV: CPT | Performed by: FAMILY MEDICINE

## 2018-08-16 PROCEDURE — 1036F TOBACCO NON-USER: CPT | Performed by: FAMILY MEDICINE

## 2018-08-16 RX ORDER — METHYLPREDNISOLONE ACETATE 80 MG/ML
80 INJECTION, SUSPENSION INTRA-ARTICULAR; INTRALESIONAL; INTRAMUSCULAR; SOFT TISSUE ONCE
Status: COMPLETED | OUTPATIENT
Start: 2018-08-16 | End: 2018-08-16

## 2018-08-16 RX ORDER — TESTOSTERONE CYPIONATE 200 MG/ML
400 INJECTION INTRAMUSCULAR ONCE
Status: COMPLETED | OUTPATIENT
Start: 2018-08-16 | End: 2018-08-16

## 2018-08-16 RX ADMIN — TESTOSTERONE CYPIONATE 400 MG: 200 INJECTION INTRAMUSCULAR at 17:35

## 2018-08-16 RX ADMIN — METHYLPREDNISOLONE ACETATE 80 MG: 80 INJECTION, SUSPENSION INTRA-ARTICULAR; INTRALESIONAL; INTRAMUSCULAR; SOFT TISSUE at 17:33

## 2018-08-16 ASSESSMENT — PATIENT HEALTH QUESTIONNAIRE - PHQ9
SUM OF ALL RESPONSES TO PHQ9 QUESTIONS 1 & 2: 0
SUM OF ALL RESPONSES TO PHQ QUESTIONS 1-9: 0
SUM OF ALL RESPONSES TO PHQ QUESTIONS 1-9: 0
2. FEELING DOWN, DEPRESSED OR HOPELESS: 0
1. LITTLE INTEREST OR PLEASURE IN DOING THINGS: 0

## 2018-08-17 ASSESSMENT — ENCOUNTER SYMPTOMS
COUGH: 0
NAUSEA: 0
VOMITING: 0
DIARRHEA: 0
ABDOMINAL PAIN: 0
SHORTNESS OF BREATH: 0
TROUBLE SWALLOWING: 0
CHEST TIGHTNESS: 0
BLOOD IN STOOL: 0
CONSTIPATION: 0

## 2018-09-06 ENCOUNTER — OFFICE VISIT (OUTPATIENT)
Dept: FAMILY MEDICINE CLINIC | Age: 53
End: 2018-09-06
Payer: COMMERCIAL

## 2018-09-06 VITALS
OXYGEN SATURATION: 96 % | DIASTOLIC BLOOD PRESSURE: 80 MMHG | SYSTOLIC BLOOD PRESSURE: 128 MMHG | WEIGHT: 305.4 LBS | HEIGHT: 70 IN | TEMPERATURE: 97.8 F | HEART RATE: 89 BPM | RESPIRATION RATE: 14 BRPM | BODY MASS INDEX: 43.72 KG/M2

## 2018-09-06 DIAGNOSIS — E11.9 TYPE 2 DIABETES MELLITUS WITHOUT COMPLICATION, WITHOUT LONG-TERM CURRENT USE OF INSULIN (HCC): ICD-10-CM

## 2018-09-06 DIAGNOSIS — E29.1 HYPOGONADISM MALE: Primary | ICD-10-CM

## 2018-09-06 LAB — HBA1C MFR BLD: 7.3 %

## 2018-09-06 PROCEDURE — G8417 CALC BMI ABV UP PARAM F/U: HCPCS | Performed by: FAMILY MEDICINE

## 2018-09-06 PROCEDURE — 3045F PR MOST RECENT HEMOGLOBIN A1C LEVEL 7.0-9.0%: CPT | Performed by: FAMILY MEDICINE

## 2018-09-06 PROCEDURE — 2022F DILAT RTA XM EVC RTNOPTHY: CPT | Performed by: FAMILY MEDICINE

## 2018-09-06 PROCEDURE — 3017F COLORECTAL CA SCREEN DOC REV: CPT | Performed by: FAMILY MEDICINE

## 2018-09-06 PROCEDURE — 99212 OFFICE O/P EST SF 10 MIN: CPT | Performed by: FAMILY MEDICINE

## 2018-09-06 PROCEDURE — 1036F TOBACCO NON-USER: CPT | Performed by: FAMILY MEDICINE

## 2018-09-06 PROCEDURE — 83036 HEMOGLOBIN GLYCOSYLATED A1C: CPT | Performed by: FAMILY MEDICINE

## 2018-09-06 PROCEDURE — 96372 THER/PROPH/DIAG INJ SC/IM: CPT | Performed by: FAMILY MEDICINE

## 2018-09-06 PROCEDURE — G8427 DOCREV CUR MEDS BY ELIG CLIN: HCPCS | Performed by: FAMILY MEDICINE

## 2018-09-06 RX ORDER — TESTOSTERONE CYPIONATE 200 MG/ML
400 INJECTION INTRAMUSCULAR ONCE
Status: COMPLETED | OUTPATIENT
Start: 2018-09-06 | End: 2018-09-06

## 2018-09-06 RX ADMIN — TESTOSTERONE CYPIONATE 400 MG: 200 INJECTION INTRAMUSCULAR at 12:13

## 2018-09-06 ASSESSMENT — PATIENT HEALTH QUESTIONNAIRE - PHQ9
SUM OF ALL RESPONSES TO PHQ QUESTIONS 1-9: 0
SUM OF ALL RESPONSES TO PHQ9 QUESTIONS 1 & 2: 0
1. LITTLE INTEREST OR PLEASURE IN DOING THINGS: 0
SUM OF ALL RESPONSES TO PHQ QUESTIONS 1-9: 0
2. FEELING DOWN, DEPRESSED OR HOPELESS: 0

## 2018-09-06 NOTE — PROGRESS NOTES
Chief Complaint   Patient presents with    Hypogonadism       HPI: Gerri Haynes is a 52 y.o. male presenting for follow-up of hypogonadism. He notices that he \"is dropping off\" a couple days before the shot. He is back on the Cymbalta which helps the pain but he was walking around in a fog. Work is stressful now also. The patient is diabetic and we need to recheck his hemoglobin A1c. We will do that with point-of-care testing. EXAM:  Constitutional Blood pressure 128/80, pulse 89, temperature 97.8 °F (36.6 °C), temperature source Temporal, resp. rate 14, height 5' 10\" (1.778 m), weight (!) 305 lb 6.4 oz (138.5 kg), SpO2 96 %. Physical Exam   Constitutional: He appears well-developed and well-nourished. Cardiovascular: Normal rate and regular rhythm. Pulmonary/Chest: Effort normal and breath sounds normal.     Finger stick hemoglobin A1C is 7.3%    DIAGNOSIS:    Diagnosis Orders   1. Hypogonadism male  testosterone cypionate (DEPOTESTOTERONE CYPIONATE) injection 400 mg   2. Type 2 diabetes mellitus without complication, without long-term current use of insulin ([facility])  POCT glycosylated hemoglobin (Hb A1C)     Plan for follow up: Follow up in 3 weeksfor next testosterone injection. Other follow up as needed.       Electronically signed by Marcella Valdovinos, 9:31 PM 9/6/18

## 2018-09-18 DIAGNOSIS — I10 ESSENTIAL HYPERTENSION: ICD-10-CM

## 2018-09-19 RX ORDER — AMLODIPINE BESYLATE 10 MG/1
10 TABLET ORAL DAILY
Qty: 90 TABLET | Refills: 3 | Status: SHIPPED | OUTPATIENT
Start: 2018-09-19 | End: 2019-03-20 | Stop reason: SDUPTHER

## 2018-09-27 ENCOUNTER — OFFICE VISIT (OUTPATIENT)
Dept: FAMILY MEDICINE CLINIC | Age: 53
End: 2018-09-27
Payer: COMMERCIAL

## 2018-09-27 VITALS
WEIGHT: 304 LBS | DIASTOLIC BLOOD PRESSURE: 78 MMHG | RESPIRATION RATE: 14 BRPM | SYSTOLIC BLOOD PRESSURE: 134 MMHG | OXYGEN SATURATION: 94 % | TEMPERATURE: 98.6 F | BODY MASS INDEX: 43.52 KG/M2 | HEART RATE: 90 BPM | HEIGHT: 70 IN

## 2018-09-27 DIAGNOSIS — E78.2 MIXED HYPERLIPIDEMIA: ICD-10-CM

## 2018-09-27 DIAGNOSIS — E29.1 HYPOGONADISM MALE: Primary | ICD-10-CM

## 2018-09-27 DIAGNOSIS — M05.79 RHEUMATOID ARTHRITIS INVOLVING MULTIPLE SITES WITH POSITIVE RHEUMATOID FACTOR (HCC): ICD-10-CM

## 2018-09-27 PROCEDURE — G8417 CALC BMI ABV UP PARAM F/U: HCPCS | Performed by: FAMILY MEDICINE

## 2018-09-27 PROCEDURE — 1036F TOBACCO NON-USER: CPT | Performed by: FAMILY MEDICINE

## 2018-09-27 PROCEDURE — 99212 OFFICE O/P EST SF 10 MIN: CPT | Performed by: FAMILY MEDICINE

## 2018-09-27 PROCEDURE — G8427 DOCREV CUR MEDS BY ELIG CLIN: HCPCS | Performed by: FAMILY MEDICINE

## 2018-09-27 PROCEDURE — 3017F COLORECTAL CA SCREEN DOC REV: CPT | Performed by: FAMILY MEDICINE

## 2018-09-27 RX ORDER — ROSUVASTATIN CALCIUM 40 MG/1
40 TABLET, COATED ORAL EVERY EVENING
Qty: 90 TABLET | Refills: 3 | Status: SHIPPED | OUTPATIENT
Start: 2018-09-27 | End: 2018-11-23 | Stop reason: SINTOL

## 2018-09-27 RX ORDER — TRAMADOL HYDROCHLORIDE 50 MG/1
100 TABLET ORAL EVERY 6 HOURS PRN
Qty: 240 TABLET | Refills: 0 | Status: SHIPPED | OUTPATIENT
Start: 2018-09-27 | End: 2018-10-27

## 2018-09-27 ASSESSMENT — PATIENT HEALTH QUESTIONNAIRE - PHQ9
SUM OF ALL RESPONSES TO PHQ9 QUESTIONS 1 & 2: 0
SUM OF ALL RESPONSES TO PHQ QUESTIONS 1-9: 0
1. LITTLE INTEREST OR PLEASURE IN DOING THINGS: 0
2. FEELING DOWN, DEPRESSED OR HOPELESS: 0
SUM OF ALL RESPONSES TO PHQ QUESTIONS 1-9: 0

## 2018-10-01 PROCEDURE — 96372 THER/PROPH/DIAG INJ SC/IM: CPT | Performed by: FAMILY MEDICINE

## 2018-10-01 RX ORDER — TESTOSTERONE CYPIONATE 200 MG/ML
400 INJECTION INTRAMUSCULAR ONCE
Status: COMPLETED | OUTPATIENT
Start: 2018-10-01 | End: 2018-10-01

## 2018-10-01 RX ADMIN — TESTOSTERONE CYPIONATE 400 MG: 200 INJECTION INTRAMUSCULAR at 08:00

## 2018-10-25 ENCOUNTER — OFFICE VISIT (OUTPATIENT)
Dept: FAMILY MEDICINE CLINIC | Age: 53
End: 2018-10-25
Payer: COMMERCIAL

## 2018-10-25 VITALS
WEIGHT: 307.4 LBS | HEART RATE: 76 BPM | TEMPERATURE: 97.7 F | OXYGEN SATURATION: 97 % | HEIGHT: 70 IN | SYSTOLIC BLOOD PRESSURE: 130 MMHG | DIASTOLIC BLOOD PRESSURE: 72 MMHG | RESPIRATION RATE: 14 BRPM | BODY MASS INDEX: 44.01 KG/M2

## 2018-10-25 DIAGNOSIS — E29.1 HYPOGONADISM MALE: Primary | ICD-10-CM

## 2018-10-25 PROCEDURE — G8484 FLU IMMUNIZE NO ADMIN: HCPCS | Performed by: FAMILY MEDICINE

## 2018-10-25 PROCEDURE — 96372 THER/PROPH/DIAG INJ SC/IM: CPT | Performed by: FAMILY MEDICINE

## 2018-10-25 PROCEDURE — 3017F COLORECTAL CA SCREEN DOC REV: CPT | Performed by: FAMILY MEDICINE

## 2018-10-25 PROCEDURE — G8427 DOCREV CUR MEDS BY ELIG CLIN: HCPCS | Performed by: FAMILY MEDICINE

## 2018-10-25 PROCEDURE — 1036F TOBACCO NON-USER: CPT | Performed by: FAMILY MEDICINE

## 2018-10-25 PROCEDURE — G8417 CALC BMI ABV UP PARAM F/U: HCPCS | Performed by: FAMILY MEDICINE

## 2018-10-25 PROCEDURE — 99212 OFFICE O/P EST SF 10 MIN: CPT | Performed by: FAMILY MEDICINE

## 2018-10-25 RX ORDER — TESTOSTERONE CYPIONATE 200 MG/ML
400 INJECTION INTRAMUSCULAR ONCE
Status: COMPLETED | OUTPATIENT
Start: 2018-10-25 | End: 2018-10-25

## 2018-10-25 RX ADMIN — TESTOSTERONE CYPIONATE 400 MG: 200 INJECTION INTRAMUSCULAR at 14:52

## 2018-11-23 ENCOUNTER — OFFICE VISIT (OUTPATIENT)
Dept: FAMILY MEDICINE CLINIC | Age: 53
End: 2018-11-23
Payer: COMMERCIAL

## 2018-11-23 VITALS
WEIGHT: 304 LBS | TEMPERATURE: 98 F | SYSTOLIC BLOOD PRESSURE: 130 MMHG | BODY MASS INDEX: 43.52 KG/M2 | RESPIRATION RATE: 16 BRPM | OXYGEN SATURATION: 95 % | DIASTOLIC BLOOD PRESSURE: 80 MMHG | HEIGHT: 70 IN | HEART RATE: 94 BPM

## 2018-11-23 DIAGNOSIS — E78.2 MIXED HYPERLIPIDEMIA: ICD-10-CM

## 2018-11-23 DIAGNOSIS — E29.1 HYPOGONADISM MALE: Primary | ICD-10-CM

## 2018-11-23 LAB
FOLATE: 12.9 NG/ML (ref 7.3–26.1)
VITAMIN B-12: 346 PG/ML (ref 232–1245)

## 2018-11-23 PROCEDURE — 3017F COLORECTAL CA SCREEN DOC REV: CPT | Performed by: FAMILY MEDICINE

## 2018-11-23 PROCEDURE — 99213 OFFICE O/P EST LOW 20 MIN: CPT | Performed by: FAMILY MEDICINE

## 2018-11-23 PROCEDURE — G8417 CALC BMI ABV UP PARAM F/U: HCPCS | Performed by: FAMILY MEDICINE

## 2018-11-23 PROCEDURE — G8484 FLU IMMUNIZE NO ADMIN: HCPCS | Performed by: FAMILY MEDICINE

## 2018-11-23 PROCEDURE — 1036F TOBACCO NON-USER: CPT | Performed by: FAMILY MEDICINE

## 2018-11-23 PROCEDURE — 96372 THER/PROPH/DIAG INJ SC/IM: CPT | Performed by: FAMILY MEDICINE

## 2018-11-23 PROCEDURE — G8427 DOCREV CUR MEDS BY ELIG CLIN: HCPCS | Performed by: FAMILY MEDICINE

## 2018-11-23 RX ORDER — TESTOSTERONE CYPIONATE 200 MG/ML
400 INJECTION INTRAMUSCULAR ONCE
Status: COMPLETED | OUTPATIENT
Start: 2018-11-23 | End: 2018-11-23

## 2018-11-23 RX ADMIN — TESTOSTERONE CYPIONATE 400 MG: 200 INJECTION INTRAMUSCULAR at 11:30

## 2018-11-26 ENCOUNTER — HOSPITAL ENCOUNTER (OUTPATIENT)
Dept: MRI IMAGING | Age: 53
Discharge: HOME | End: 2018-11-28
Payer: COMMERCIAL

## 2018-11-26 DIAGNOSIS — M54.12 CERVICAL RADICULOPATHY: ICD-10-CM

## 2018-11-26 DIAGNOSIS — M54.16 LUMBAR RADICULOPATHY: ICD-10-CM

## 2018-11-26 DIAGNOSIS — M54.14 THORACIC RADICULOPATHY: ICD-10-CM

## 2018-11-26 LAB — DOUBLE STRANDED DNA AB, IGG: NORMAL

## 2018-11-26 PROCEDURE — 72146 MRI CHEST SPINE W/O DYE: CPT

## 2018-11-26 PROCEDURE — 72148 MRI LUMBAR SPINE W/O DYE: CPT

## 2018-11-26 PROCEDURE — 72141 MRI NECK SPINE W/O DYE: CPT

## 2018-12-08 DIAGNOSIS — E29.1 HYPOGONADISM MALE: ICD-10-CM

## 2018-12-08 DIAGNOSIS — E11.9 TYPE 2 DIABETES MELLITUS WITHOUT COMPLICATION, WITHOUT LONG-TERM CURRENT USE OF INSULIN (HCC): ICD-10-CM

## 2018-12-08 DIAGNOSIS — I10 ESSENTIAL HYPERTENSION: ICD-10-CM

## 2018-12-08 DIAGNOSIS — E78.2 MIXED HYPERLIPIDEMIA: ICD-10-CM

## 2018-12-08 LAB
ALBUMIN SERPL-MCNC: 4.6 G/DL (ref 3.9–4.9)
ALP BLD-CCNC: 58 U/L (ref 35–104)
ALT SERPL-CCNC: 30 U/L (ref 0–41)
ANION GAP SERPL CALCULATED.3IONS-SCNC: 14 MEQ/L (ref 7–13)
AST SERPL-CCNC: 29 U/L (ref 0–40)
BASOPHILS ABSOLUTE: 0 K/UL (ref 0–0.2)
BASOPHILS RELATIVE PERCENT: 0.9 %
BILIRUB SERPL-MCNC: 0.4 MG/DL (ref 0–1.2)
BUN BLDV-MCNC: 15 MG/DL (ref 6–20)
CALCIUM SERPL-MCNC: 9.5 MG/DL (ref 8.6–10.2)
CHLORIDE BLD-SCNC: 98 MEQ/L (ref 98–107)
CHOLESTEROL, TOTAL: 266 MG/DL (ref 0–199)
CO2: 26 MEQ/L (ref 22–29)
CREAT SERPL-MCNC: 0.99 MG/DL (ref 0.7–1.2)
CREATININE URINE: 93.6 MG/DL
EOSINOPHILS ABSOLUTE: 0.1 K/UL (ref 0–0.7)
EOSINOPHILS RELATIVE PERCENT: 1.2 %
GFR AFRICAN AMERICAN: >60
GFR NON-AFRICAN AMERICAN: >60
GLOBULIN: 3.3 G/DL (ref 2.3–3.5)
GLUCOSE BLD-MCNC: 192 MG/DL (ref 74–109)
HBA1C MFR BLD: 8.3 % (ref 4.8–5.9)
HCT VFR BLD CALC: 46.1 % (ref 42–52)
HDLC SERPL-MCNC: 36 MG/DL (ref 40–59)
HEMOGLOBIN: 15.7 G/DL (ref 14–18)
LDL CHOLESTEROL CALCULATED: ABNORMAL MG/DL (ref 0–129)
LYMPHOCYTES ABSOLUTE: 1.1 K/UL (ref 1–4.8)
LYMPHOCYTES RELATIVE PERCENT: 24.7 %
MCH RBC QN AUTO: 32 PG (ref 27–31.3)
MCHC RBC AUTO-ENTMCNC: 34.2 % (ref 33–37)
MCV RBC AUTO: 93.8 FL (ref 80–100)
MICROALBUMIN UR-MCNC: 27 MG/DL
MICROALBUMIN/CREAT UR-RTO: 288.5 MG/G (ref 0–30)
MONOCYTES ABSOLUTE: 0.5 K/UL (ref 0.2–0.8)
MONOCYTES RELATIVE PERCENT: 10.4 %
NEUTROPHILS ABSOLUTE: 2.9 K/UL (ref 1.4–6.5)
NEUTROPHILS RELATIVE PERCENT: 62.8 %
PDW BLD-RTO: 14.3 % (ref 11.5–14.5)
PLATELET # BLD: 264 K/UL (ref 130–400)
POTASSIUM SERPL-SCNC: 4.3 MEQ/L (ref 3.5–5.1)
RBC # BLD: 4.91 M/UL (ref 4.7–6.1)
SODIUM BLD-SCNC: 138 MEQ/L (ref 132–144)
TOTAL PROTEIN: 7.9 G/DL (ref 6.4–8.1)
TRIGL SERPL-MCNC: 418 MG/DL (ref 0–200)
WBC # BLD: 4.6 K/UL (ref 4.8–10.8)

## 2018-12-11 LAB — TESTOSTERONE TOTAL-MALE: 521 NG/DL (ref 300–890)

## 2018-12-18 ENCOUNTER — OFFICE VISIT (OUTPATIENT)
Dept: FAMILY MEDICINE CLINIC | Age: 53
End: 2018-12-18
Payer: COMMERCIAL

## 2018-12-18 VITALS
DIASTOLIC BLOOD PRESSURE: 82 MMHG | SYSTOLIC BLOOD PRESSURE: 126 MMHG | TEMPERATURE: 98.4 F | WEIGHT: 307.3 LBS | HEIGHT: 70 IN | OXYGEN SATURATION: 95 % | RESPIRATION RATE: 16 BRPM | BODY MASS INDEX: 43.99 KG/M2 | HEART RATE: 92 BPM

## 2018-12-18 DIAGNOSIS — I10 ESSENTIAL HYPERTENSION: ICD-10-CM

## 2018-12-18 DIAGNOSIS — E11.9 TYPE 2 DIABETES MELLITUS WITHOUT COMPLICATION, WITHOUT LONG-TERM CURRENT USE OF INSULIN (HCC): Primary | ICD-10-CM

## 2018-12-18 DIAGNOSIS — E29.1 HYPOGONADISM MALE: ICD-10-CM

## 2018-12-18 DIAGNOSIS — E78.2 MIXED HYPERLIPIDEMIA: ICD-10-CM

## 2018-12-18 DIAGNOSIS — E03.9 ACQUIRED HYPOTHYROIDISM: ICD-10-CM

## 2018-12-18 PROCEDURE — 3045F PR MOST RECENT HEMOGLOBIN A1C LEVEL 7.0-9.0%: CPT | Performed by: FAMILY MEDICINE

## 2018-12-18 PROCEDURE — G8417 CALC BMI ABV UP PARAM F/U: HCPCS | Performed by: FAMILY MEDICINE

## 2018-12-18 PROCEDURE — G8427 DOCREV CUR MEDS BY ELIG CLIN: HCPCS | Performed by: FAMILY MEDICINE

## 2018-12-18 PROCEDURE — 96372 THER/PROPH/DIAG INJ SC/IM: CPT | Performed by: FAMILY MEDICINE

## 2018-12-18 PROCEDURE — 99214 OFFICE O/P EST MOD 30 MIN: CPT | Performed by: FAMILY MEDICINE

## 2018-12-18 PROCEDURE — G8484 FLU IMMUNIZE NO ADMIN: HCPCS | Performed by: FAMILY MEDICINE

## 2018-12-18 PROCEDURE — 3017F COLORECTAL CA SCREEN DOC REV: CPT | Performed by: FAMILY MEDICINE

## 2018-12-18 PROCEDURE — 1036F TOBACCO NON-USER: CPT | Performed by: FAMILY MEDICINE

## 2018-12-18 PROCEDURE — 2022F DILAT RTA XM EVC RTNOPTHY: CPT | Performed by: FAMILY MEDICINE

## 2018-12-18 RX ORDER — TESTOSTERONE CYPIONATE 200 MG/ML
400 INJECTION INTRAMUSCULAR ONCE
Status: COMPLETED | OUTPATIENT
Start: 2018-12-18 | End: 2018-12-18

## 2018-12-18 RX ORDER — PRAVASTATIN SODIUM 40 MG
40 TABLET ORAL EVERY EVENING
Qty: 90 TABLET | Refills: 3 | Status: SHIPPED | OUTPATIENT
Start: 2018-12-18 | End: 2019-03-20 | Stop reason: SDUPTHER

## 2018-12-18 RX ADMIN — TESTOSTERONE CYPIONATE 400 MG: 200 INJECTION INTRAMUSCULAR at 17:32

## 2018-12-18 ASSESSMENT — PATIENT HEALTH QUESTIONNAIRE - PHQ9
SUM OF ALL RESPONSES TO PHQ QUESTIONS 1-9: 0
2. FEELING DOWN, DEPRESSED OR HOPELESS: 0
SUM OF ALL RESPONSES TO PHQ9 QUESTIONS 1 & 2: 0
SUM OF ALL RESPONSES TO PHQ QUESTIONS 1-9: 0
1. LITTLE INTEREST OR PLEASURE IN DOING THINGS: 0

## 2018-12-18 NOTE — PATIENT INSTRUCTIONS
Total Bilirubin 12/08/2018 0.4  0.0 - 1.2 mg/dL Final    Alkaline Phosphatase 12/08/2018 58  35 - 104 U/L Final    ALT 12/08/2018 30  0 - 41 U/L Final    AST 12/08/2018 29  0 - 40 U/L Final    Globulin 12/08/2018 3.3  2.3 - 3.5 g/dL Final    Cholesterol, Total 12/08/2018 266* 0 - 199 mg/dL Final    ATP III Cholesterol Classification is High.  Triglycerides 12/08/2018 418* 0 - 200 mg/dL Final    ATP III Triglycerides Classification is High.  HDL 12/08/2018 36* 40 - 59 mg/dL Final    Comment: ATP III HDL Cholestrol Classification is low. Expected Values:    Males:    >55 = No Risk            35-55 = Moderate Risk            <35 = High Risk    Females:  >65 = No Risk            45-65 = Moderate Risk            <45 = High Risk    NCEP Guidelines: Third Report May 2001  >59 = negative risk factor for CHD  <40 = major risk factor for CHD      LDL Calculated 12/08/2018 see below  0 - 129 mg/dL Final    Comment: When the triglyceride is >400 mg/dL the calculated LDL and  VLDL are not valid.  Microalbumin, Random Urine 12/08/2018 27.00* Not Established mg/dL Final    Creatinine, Ur 12/08/2018 93.6  Not Established mg/dL Final    Microalbumin Creatinine Ratio 12/08/2018 288.5* 0.0 - 30.0 mg/G Final    Hemoglobin A1C 12/08/2018 8.3* 4.8 - 5.9 % Final    Total Testosterone 12/08/2018 521  300 - 890 ng/dL Final    Comment: Total testosterone values may not reflect optimal concentrations in all  individuals. Free or bioavailable testosterone measurements may provide  supportive information. REFERENCE INTERVAL: Testosterone, Adult Male  Access complete set of age- and/or gender-specific reference intervals  for  this test in the [facility] Directory ([email or web address]). Performed by [address], [address] [facility] [phone]  www. Paulino Wiseman MD - Lab.  Director

## 2018-12-18 NOTE — PROGRESS NOTES
Diabetes Mellitus Type 2: Current symptoms/problems include none. Home blood sugar records: patient does not test  Any episodes of hypoglycemia? no  Known diabetic complications: none but poor control therefore we will add metformin 500 mg twice a day. Hypertension is controlled with low salt diet, amlodipine 10 mg daily, carvedilol 12.5 mg twice a day and losartan 100 mg daily. Hyperlipidemia has been controlled on pravastatin 40 mg daily. He has no side effects with medication. Hypothyroidism is well treated with Synthroid 75 µg daily. Hypogonadism which is responding well to every 4 weeks testosterone injections. He is due for one now. I will administer that myself. He has rheumatoid arthritis that is being treated with Humira. Blood pressure less than 131/81,   BP Readings from Last 1 Encounters:   12/18/18 126/82       Social history: This pt is not a smoker. This pt does not take an aspirin a day. Last eye exam was over a year ago  Last diabetic foot exam was 3/12/2018   Lab results for chronic conditions:    GFR Non- (no units)   Date Value   12/08/2018 >60.0   08/11/2018 >60.0   05/09/2018 52.6 (L)     No results found for: GFR  Cholesterol, Total (mg/dL)   Date Value   12/08/2018 266 (H)   08/11/2018 172   03/10/2018 151     Triglycerides (mg/dL)   Date Value   12/08/2018 418 (H)   08/11/2018 224 (H)   03/10/2018 192     HDL (mg/dL)   Date Value   12/08/2018 36 (L)   08/11/2018 42   03/10/2018 38 (L)     LDL Calculated (mg/dL)   Date Value   12/08/2018 see below   08/11/2018 85   03/10/2018 75     TSH (uIU/mL)   Date Value   08/11/2018 1.150   03/10/2018 1.540   12/09/2017 1.350     Hemoglobin A1C (%)   Date Value   12/08/2018 8.3 (H)   09/06/2018 7.3 (H)   03/10/2018 7.0 (H)     Microalbumin, Random Urine (mg/dL)   Date Value   12/08/2018 27.00 (H)     Review of Systems   Respiratory: Negative for cough, chest tightness and shortness of breath.     Cardiovascular: Negative

## 2018-12-22 ASSESSMENT — ENCOUNTER SYMPTOMS
VOMITING: 0
DIARRHEA: 0
NAUSEA: 0
COUGH: 0
SHORTNESS OF BREATH: 0
ABDOMINAL PAIN: 0
CHEST TIGHTNESS: 0
CONSTIPATION: 0

## 2019-01-17 ENCOUNTER — OFFICE VISIT (OUTPATIENT)
Dept: FAMILY MEDICINE CLINIC | Age: 54
End: 2019-01-17
Payer: COMMERCIAL

## 2019-01-17 VITALS
DIASTOLIC BLOOD PRESSURE: 80 MMHG | RESPIRATION RATE: 24 BRPM | TEMPERATURE: 98.3 F | HEART RATE: 90 BPM | WEIGHT: 301 LBS | SYSTOLIC BLOOD PRESSURE: 136 MMHG | HEIGHT: 70 IN | BODY MASS INDEX: 43.09 KG/M2

## 2019-01-17 DIAGNOSIS — J20.8 ACUTE BACTERIAL BRONCHITIS: Primary | ICD-10-CM

## 2019-01-17 DIAGNOSIS — B96.89 ACUTE BACTERIAL BRONCHITIS: Primary | ICD-10-CM

## 2019-01-17 DIAGNOSIS — E29.1 HYPOGONADISM MALE: ICD-10-CM

## 2019-01-17 PROCEDURE — 96372 THER/PROPH/DIAG INJ SC/IM: CPT | Performed by: FAMILY MEDICINE

## 2019-01-17 PROCEDURE — 99213 OFFICE O/P EST LOW 20 MIN: CPT | Performed by: FAMILY MEDICINE

## 2019-01-17 PROCEDURE — G8484 FLU IMMUNIZE NO ADMIN: HCPCS | Performed by: FAMILY MEDICINE

## 2019-01-17 PROCEDURE — 1036F TOBACCO NON-USER: CPT | Performed by: FAMILY MEDICINE

## 2019-01-17 PROCEDURE — G8417 CALC BMI ABV UP PARAM F/U: HCPCS | Performed by: FAMILY MEDICINE

## 2019-01-17 PROCEDURE — 3017F COLORECTAL CA SCREEN DOC REV: CPT | Performed by: FAMILY MEDICINE

## 2019-01-17 PROCEDURE — G8427 DOCREV CUR MEDS BY ELIG CLIN: HCPCS | Performed by: FAMILY MEDICINE

## 2019-01-17 RX ORDER — TESTOSTERONE CYPIONATE 200 MG/ML
400 INJECTION INTRAMUSCULAR ONCE
Status: COMPLETED | OUTPATIENT
Start: 2019-01-17 | End: 2019-01-17

## 2019-01-17 RX ORDER — TESTOSTERONE CYPIONATE 100 MG/ML
400 INJECTION, SOLUTION INTRAMUSCULAR ONCE
Status: DISCONTINUED | OUTPATIENT
Start: 2019-01-17 | End: 2019-01-17

## 2019-01-17 RX ORDER — AMOXICILLIN AND CLAVULANATE POTASSIUM 875; 125 MG/1; MG/1
1 TABLET, FILM COATED ORAL EVERY 12 HOURS
Qty: 20 TABLET | Refills: 0 | Status: SHIPPED | OUTPATIENT
Start: 2019-01-17 | End: 2019-01-27

## 2019-01-17 RX ADMIN — TESTOSTERONE CYPIONATE 400 MG: 200 INJECTION INTRAMUSCULAR at 16:35

## 2019-02-14 ENCOUNTER — OFFICE VISIT (OUTPATIENT)
Dept: FAMILY MEDICINE CLINIC | Age: 54
End: 2019-02-14
Payer: COMMERCIAL

## 2019-02-14 VITALS
HEIGHT: 70 IN | TEMPERATURE: 97.9 F | OXYGEN SATURATION: 93 % | HEART RATE: 92 BPM | SYSTOLIC BLOOD PRESSURE: 134 MMHG | WEIGHT: 304.4 LBS | RESPIRATION RATE: 16 BRPM | BODY MASS INDEX: 43.58 KG/M2 | DIASTOLIC BLOOD PRESSURE: 80 MMHG

## 2019-02-14 DIAGNOSIS — M45.9 RHEUMATOID ARTHRITIS INVOLVING VERTEBRA WITH POSITIVE RHEUMATOID FACTOR (HCC): ICD-10-CM

## 2019-02-14 DIAGNOSIS — E29.1 HYPOGONADISM MALE: Primary | ICD-10-CM

## 2019-02-14 PROCEDURE — 3017F COLORECTAL CA SCREEN DOC REV: CPT | Performed by: FAMILY MEDICINE

## 2019-02-14 PROCEDURE — G8427 DOCREV CUR MEDS BY ELIG CLIN: HCPCS | Performed by: FAMILY MEDICINE

## 2019-02-14 PROCEDURE — 1036F TOBACCO NON-USER: CPT | Performed by: FAMILY MEDICINE

## 2019-02-14 PROCEDURE — 96372 THER/PROPH/DIAG INJ SC/IM: CPT | Performed by: FAMILY MEDICINE

## 2019-02-14 PROCEDURE — G8417 CALC BMI ABV UP PARAM F/U: HCPCS | Performed by: FAMILY MEDICINE

## 2019-02-14 PROCEDURE — 99213 OFFICE O/P EST LOW 20 MIN: CPT | Performed by: FAMILY MEDICINE

## 2019-02-14 PROCEDURE — G8484 FLU IMMUNIZE NO ADMIN: HCPCS | Performed by: FAMILY MEDICINE

## 2019-02-14 RX ORDER — PREDNISONE 20 MG/1
TABLET ORAL
Qty: 20 TABLET | Refills: 0 | Status: SHIPPED | OUTPATIENT
Start: 2019-02-14 | End: 2019-02-24

## 2019-02-14 RX ORDER — METHYLPREDNISOLONE ACETATE 80 MG/ML
80 INJECTION, SUSPENSION INTRA-ARTICULAR; INTRALESIONAL; INTRAMUSCULAR; SOFT TISSUE ONCE
Status: COMPLETED | OUTPATIENT
Start: 2019-02-14 | End: 2019-02-14

## 2019-02-14 RX ORDER — TESTOSTERONE CYPIONATE 100 MG/ML
200 INJECTION, SOLUTION INTRAMUSCULAR ONCE
Status: DISCONTINUED | OUTPATIENT
Start: 2019-02-14 | End: 2019-02-14

## 2019-02-14 RX ORDER — TESTOSTERONE CYPIONATE 100 MG/ML
100 INJECTION, SOLUTION INTRAMUSCULAR ONCE
Status: DISCONTINUED | OUTPATIENT
Start: 2019-02-14 | End: 2019-02-14

## 2019-02-14 RX ORDER — TESTOSTERONE CYPIONATE 200 MG/ML
400 INJECTION INTRAMUSCULAR ONCE
Status: COMPLETED | OUTPATIENT
Start: 2019-02-14 | End: 2019-02-14

## 2019-02-14 RX ADMIN — METHYLPREDNISOLONE ACETATE 80 MG: 80 INJECTION, SUSPENSION INTRA-ARTICULAR; INTRALESIONAL; INTRAMUSCULAR; SOFT TISSUE at 17:22

## 2019-02-14 RX ADMIN — TESTOSTERONE CYPIONATE 400 MG: 200 INJECTION INTRAMUSCULAR at 17:41

## 2019-02-14 ASSESSMENT — PATIENT HEALTH QUESTIONNAIRE - PHQ9
SUM OF ALL RESPONSES TO PHQ9 QUESTIONS 1 & 2: 0
SUM OF ALL RESPONSES TO PHQ QUESTIONS 1-9: 0
2. FEELING DOWN, DEPRESSED OR HOPELESS: 0
1. LITTLE INTEREST OR PLEASURE IN DOING THINGS: 0
SUM OF ALL RESPONSES TO PHQ QUESTIONS 1-9: 0

## 2019-02-18 ENCOUNTER — TELEPHONE (OUTPATIENT)
Dept: FAMILY MEDICINE CLINIC | Age: 54
End: 2019-02-18

## 2019-03-08 DIAGNOSIS — E78.2 MIXED HYPERLIPIDEMIA: ICD-10-CM

## 2019-03-08 DIAGNOSIS — E03.9 ACQUIRED HYPOTHYROIDISM: ICD-10-CM

## 2019-03-08 DIAGNOSIS — I10 ESSENTIAL HYPERTENSION: ICD-10-CM

## 2019-03-08 DIAGNOSIS — E29.1 HYPOGONADISM MALE: ICD-10-CM

## 2019-03-08 DIAGNOSIS — E11.9 TYPE 2 DIABETES MELLITUS WITHOUT COMPLICATION, WITHOUT LONG-TERM CURRENT USE OF INSULIN (HCC): ICD-10-CM

## 2019-03-08 LAB
ALBUMIN SERPL-MCNC: 4.6 G/DL (ref 3.5–4.6)
ALP BLD-CCNC: 50 U/L (ref 35–104)
ALT SERPL-CCNC: 24 U/L (ref 0–41)
ANION GAP SERPL CALCULATED.3IONS-SCNC: 14 MEQ/L (ref 9–15)
AST SERPL-CCNC: 21 U/L (ref 0–40)
BASOPHILS ABSOLUTE: 0 K/UL (ref 0–0.2)
BASOPHILS RELATIVE PERCENT: 0.6 %
BILIRUB SERPL-MCNC: 0.8 MG/DL (ref 0.2–0.7)
BUN BLDV-MCNC: 18 MG/DL (ref 6–20)
CALCIUM SERPL-MCNC: 8.9 MG/DL (ref 8.5–9.9)
CHLORIDE BLD-SCNC: 99 MEQ/L (ref 95–107)
CHOLESTEROL, TOTAL: 169 MG/DL (ref 0–199)
CO2: 24 MEQ/L (ref 20–31)
CREAT SERPL-MCNC: 1.05 MG/DL (ref 0.7–1.2)
EOSINOPHILS ABSOLUTE: 0 K/UL (ref 0–0.7)
EOSINOPHILS RELATIVE PERCENT: 0.7 %
GFR AFRICAN AMERICAN: >60
GFR NON-AFRICAN AMERICAN: >60
GLOBULIN: 2.8 G/DL (ref 2.3–3.5)
GLUCOSE BLD-MCNC: 198 MG/DL (ref 70–99)
HBA1C MFR BLD: 8.9 % (ref 4.8–5.9)
HCT VFR BLD CALC: 47.4 % (ref 42–52)
HDLC SERPL-MCNC: 39 MG/DL (ref 40–59)
HEMOGLOBIN: 15.5 G/DL (ref 14–18)
LDL CHOLESTEROL CALCULATED: 88 MG/DL (ref 0–129)
LYMPHOCYTES ABSOLUTE: 1.3 K/UL (ref 1–4.8)
LYMPHOCYTES RELATIVE PERCENT: 31.8 %
MCH RBC QN AUTO: 31 PG (ref 27–31.3)
MCHC RBC AUTO-ENTMCNC: 32.8 % (ref 33–37)
MCV RBC AUTO: 94.8 FL (ref 80–100)
MONOCYTES ABSOLUTE: 0.4 K/UL (ref 0.2–0.8)
MONOCYTES RELATIVE PERCENT: 10.6 %
NEUTROPHILS ABSOLUTE: 2.2 K/UL (ref 1.4–6.5)
NEUTROPHILS RELATIVE PERCENT: 56.3 %
PDW BLD-RTO: 15.3 % (ref 11.5–14.5)
PLATELET # BLD: 251 K/UL (ref 130–400)
PLATELET SLIDE REVIEW: ADEQUATE
POTASSIUM SERPL-SCNC: 4.8 MEQ/L (ref 3.4–4.9)
RBC # BLD: 5 M/UL (ref 4.7–6.1)
SLIDE REVIEW: ABNORMAL
SODIUM BLD-SCNC: 137 MEQ/L (ref 135–144)
TOTAL PROTEIN: 7.4 G/DL (ref 6.3–8)
TRIGL SERPL-MCNC: 208 MG/DL (ref 0–150)
TSH REFLEX: 0.86 UIU/ML (ref 0.44–3.86)
WBC # BLD: 3.9 K/UL (ref 4.8–10.8)

## 2019-03-10 LAB — TESTOSTERONE TOTAL-MALE: 107 NG/DL (ref 300–890)

## 2019-03-14 ENCOUNTER — OFFICE VISIT (OUTPATIENT)
Dept: FAMILY MEDICINE CLINIC | Age: 54
End: 2019-03-14
Payer: COMMERCIAL

## 2019-03-14 VITALS
OXYGEN SATURATION: 93 % | SYSTOLIC BLOOD PRESSURE: 130 MMHG | DIASTOLIC BLOOD PRESSURE: 78 MMHG | WEIGHT: 298.4 LBS | HEIGHT: 70 IN | HEART RATE: 99 BPM | RESPIRATION RATE: 16 BRPM | TEMPERATURE: 98.2 F | BODY MASS INDEX: 42.72 KG/M2

## 2019-03-14 DIAGNOSIS — B96.89 ACUTE BACTERIAL SINUSITIS: ICD-10-CM

## 2019-03-14 DIAGNOSIS — E11.9 TYPE 2 DIABETES MELLITUS WITHOUT COMPLICATION, WITHOUT LONG-TERM CURRENT USE OF INSULIN (HCC): Primary | ICD-10-CM

## 2019-03-14 DIAGNOSIS — J01.90 ACUTE BACTERIAL SINUSITIS: ICD-10-CM

## 2019-03-14 DIAGNOSIS — E29.1 HYPOGONADISM MALE: ICD-10-CM

## 2019-03-14 DIAGNOSIS — E78.2 MIXED HYPERLIPIDEMIA: ICD-10-CM

## 2019-03-14 DIAGNOSIS — I10 ESSENTIAL HYPERTENSION: ICD-10-CM

## 2019-03-14 PROCEDURE — 1036F TOBACCO NON-USER: CPT | Performed by: FAMILY MEDICINE

## 2019-03-14 PROCEDURE — 3017F COLORECTAL CA SCREEN DOC REV: CPT | Performed by: FAMILY MEDICINE

## 2019-03-14 PROCEDURE — 99214 OFFICE O/P EST MOD 30 MIN: CPT | Performed by: FAMILY MEDICINE

## 2019-03-14 PROCEDURE — 2022F DILAT RTA XM EVC RTNOPTHY: CPT | Performed by: FAMILY MEDICINE

## 2019-03-14 PROCEDURE — G8417 CALC BMI ABV UP PARAM F/U: HCPCS | Performed by: FAMILY MEDICINE

## 2019-03-14 PROCEDURE — G8427 DOCREV CUR MEDS BY ELIG CLIN: HCPCS | Performed by: FAMILY MEDICINE

## 2019-03-14 PROCEDURE — 3045F PR MOST RECENT HEMOGLOBIN A1C LEVEL 7.0-9.0%: CPT | Performed by: FAMILY MEDICINE

## 2019-03-14 PROCEDURE — 96372 THER/PROPH/DIAG INJ SC/IM: CPT | Performed by: FAMILY MEDICINE

## 2019-03-14 PROCEDURE — G8484 FLU IMMUNIZE NO ADMIN: HCPCS | Performed by: FAMILY MEDICINE

## 2019-03-14 RX ORDER — TESTOSTERONE CYPIONATE 200 MG/ML
400 INJECTION INTRAMUSCULAR ONCE
Status: COMPLETED | OUTPATIENT
Start: 2019-03-14 | End: 2019-03-14

## 2019-03-14 RX ORDER — AMOXICILLIN AND CLAVULANATE POTASSIUM 875; 125 MG/1; MG/1
1 TABLET, FILM COATED ORAL EVERY 12 HOURS
Qty: 20 TABLET | Refills: 0 | Status: SHIPPED | OUTPATIENT
Start: 2019-03-14 | End: 2019-03-24

## 2019-03-14 RX ADMIN — TESTOSTERONE CYPIONATE 400 MG: 200 INJECTION INTRAMUSCULAR at 17:45

## 2019-03-15 ASSESSMENT — ENCOUNTER SYMPTOMS
VOMITING: 0
FACIAL SWELLING: 1
SHORTNESS OF BREATH: 0
CHEST TIGHTNESS: 0
CONSTIPATION: 0
NAUSEA: 0
DIARRHEA: 0
ABDOMINAL PAIN: 0
BLOOD IN STOOL: 0
SORE THROAT: 1
COUGH: 1
SINUS PRESSURE: 1

## 2019-03-20 ENCOUNTER — PATIENT MESSAGE (OUTPATIENT)
Dept: FAMILY MEDICINE CLINIC | Age: 54
End: 2019-03-20

## 2019-03-20 DIAGNOSIS — M05.79 RHEUMATOID ARTHRITIS INVOLVING MULTIPLE SITES WITH POSITIVE RHEUMATOID FACTOR (HCC): ICD-10-CM

## 2019-03-20 DIAGNOSIS — I10 ESSENTIAL HYPERTENSION: ICD-10-CM

## 2019-03-20 DIAGNOSIS — E03.9 ACQUIRED HYPOTHYROIDISM: ICD-10-CM

## 2019-03-20 DIAGNOSIS — E11.9 TYPE 2 DIABETES MELLITUS WITHOUT COMPLICATION (HCC): ICD-10-CM

## 2019-03-20 DIAGNOSIS — E78.2 MIXED HYPERLIPIDEMIA: ICD-10-CM

## 2019-03-20 DIAGNOSIS — K21.9 GASTROESOPHAGEAL REFLUX DISEASE, ESOPHAGITIS PRESENCE NOT SPECIFIED: ICD-10-CM

## 2019-03-21 RX ORDER — PRAVASTATIN SODIUM 40 MG
40 TABLET ORAL EVERY EVENING
Qty: 90 TABLET | Refills: 3 | Status: SHIPPED | OUTPATIENT
Start: 2019-03-21

## 2019-03-21 RX ORDER — AMLODIPINE BESYLATE 10 MG/1
10 TABLET ORAL DAILY
Qty: 90 TABLET | Refills: 3 | Status: SHIPPED | OUTPATIENT
Start: 2019-03-21

## 2019-03-21 RX ORDER — LOSARTAN POTASSIUM 100 MG/1
100 TABLET ORAL DAILY
Qty: 90 TABLET | Refills: 3 | Status: SHIPPED | OUTPATIENT
Start: 2019-03-21

## 2019-03-21 RX ORDER — PANTOPRAZOLE SODIUM 40 MG/1
TABLET, DELAYED RELEASE ORAL
Qty: 90 TABLET | Refills: 3 | Status: SHIPPED | OUTPATIENT
Start: 2019-03-21

## 2019-03-21 RX ORDER — DULOXETIN HYDROCHLORIDE 60 MG/1
60 CAPSULE, DELAYED RELEASE ORAL DAILY
Qty: 90 CAPSULE | Refills: 3 | Status: SHIPPED | OUTPATIENT
Start: 2019-03-21

## 2019-03-21 RX ORDER — CARVEDILOL 12.5 MG/1
12.5 TABLET ORAL 2 TIMES DAILY
Qty: 180 TABLET | Refills: 3 | Status: SHIPPED | OUTPATIENT
Start: 2019-03-21

## 2019-03-21 RX ORDER — LEVOTHYROXINE SODIUM 75 MCG
75 TABLET ORAL DAILY
Qty: 90 TABLET | Refills: 3 | Status: SHIPPED | OUTPATIENT
Start: 2019-03-21

## 2019-10-30 ENCOUNTER — HOSPITAL ENCOUNTER (OUTPATIENT)
Dept: NEUROLOGY | Age: 54
Discharge: HOME | End: 2019-10-30
Payer: COMMERCIAL

## 2019-10-30 PROCEDURE — 95911 NRV CNDJ TEST 9-10 STUDIES: CPT

## 2019-10-30 PROCEDURE — 95886 MUSC TEST DONE W/N TEST COMP: CPT

## 2019-10-30 PROCEDURE — 64616 CHEMODENERV MUSC NECK DYSTON: CPT | Performed by: PSYCHIATRY & NEUROLOGY

## 2019-10-30 PROCEDURE — 6360000002 HC RX W HCPCS

## 2019-10-30 PROCEDURE — 95874 GUIDE NERV DESTR NEEDLE EMG: CPT | Performed by: PSYCHIATRY & NEUROLOGY

## 2019-11-26 ENCOUNTER — TELEPHONE (OUTPATIENT)
Dept: NEUROLOGY | Age: 54
End: 2019-11-26

## 2019-11-26 DIAGNOSIS — G24.3 CERVICAL DYSTONIA: Primary | ICD-10-CM

## 2020-01-28 ENCOUNTER — OFFICE VISIT (OUTPATIENT)
Dept: NEUROLOGY | Age: 55
End: 2020-01-28
Payer: COMMERCIAL

## 2020-01-28 VITALS — BODY MASS INDEX: 42.39 KG/M2 | SYSTOLIC BLOOD PRESSURE: 160 MMHG | DIASTOLIC BLOOD PRESSURE: 90 MMHG | WEIGHT: 295.4 LBS

## 2020-01-28 PROBLEM — G47.30 SLEEP APNEA: Status: ACTIVE | Noted: 2020-01-28

## 2020-01-28 PROBLEM — M54.16 LUMBAR RADICULOPATHY: Status: ACTIVE | Noted: 2020-01-28

## 2020-01-28 PROBLEM — M54.12 CERVICAL RADICULOPATHY: Status: ACTIVE | Noted: 2020-01-28

## 2020-01-28 PROCEDURE — G8417 CALC BMI ABV UP PARAM F/U: HCPCS | Performed by: PSYCHIATRY & NEUROLOGY

## 2020-01-28 PROCEDURE — G8427 DOCREV CUR MEDS BY ELIG CLIN: HCPCS | Performed by: PSYCHIATRY & NEUROLOGY

## 2020-01-28 PROCEDURE — 3017F COLORECTAL CA SCREEN DOC REV: CPT | Performed by: PSYCHIATRY & NEUROLOGY

## 2020-01-28 PROCEDURE — 99213 OFFICE O/P EST LOW 20 MIN: CPT | Performed by: PSYCHIATRY & NEUROLOGY

## 2020-01-28 PROCEDURE — G8484 FLU IMMUNIZE NO ADMIN: HCPCS | Performed by: PSYCHIATRY & NEUROLOGY

## 2020-01-28 PROCEDURE — 1036F TOBACCO NON-USER: CPT | Performed by: PSYCHIATRY & NEUROLOGY

## 2020-01-28 RX ORDER — EMPAGLIFLOZIN AND LINAGLIPTIN 25; 5 MG/1; MG/1
TABLET, FILM COATED ORAL
COMMUNITY
Start: 2019-12-14

## 2020-01-28 ASSESSMENT — ENCOUNTER SYMPTOMS
NAUSEA: 0
TROUBLE SWALLOWING: 0
SHORTNESS OF BREATH: 0
BACK PAIN: 0
PHOTOPHOBIA: 0
CHOKING: 0
VOMITING: 0

## 2020-01-28 NOTE — PROGRESS NOTES
 Sexual activity: Not on file   Lifestyle    Physical activity:     Days per week: Not on file     Minutes per session: Not on file    Stress: Not on file   Relationships    Social connections:     Talks on phone: Not on file     Gets together: Not on file     Attends Judaism service: Not on file     Active member of club or organization: Not on file     Attends meetings of clubs or organizations: Not on file     Relationship status: Not on file    Intimate partner violence:     Fear of current or ex partner: Not on file     Emotionally abused: Not on file     Physically abused: Not on file     Forced sexual activity: Not on file   Other Topics Concern    Not on file   Social History Narrative    Not on file     Family History   Problem Relation Age of Onset    Cancer Mother         breast    Diabetes Mother     Heart Disease Mother     High Blood Pressure Mother      Allergies   Allergen Reactions    Lisinopril-Hydrochlorothiazide [Lisinopril-Hydrochlorothiazide]      Personality change (suicidal and anger)    Lisinopril          Review of Systems   Constitutional: Negative for fever. HENT: Negative for ear pain, tinnitus and trouble swallowing. Eyes: Negative for photophobia and visual disturbance. Respiratory: Negative for choking and shortness of breath. Cardiovascular: Negative for chest pain and palpitations. Gastrointestinal: Negative for nausea and vomiting. Musculoskeletal: Negative for back pain, gait problem, joint swelling, myalgias, neck pain and neck stiffness. Neurological: Negative for dizziness, tremors, seizures, syncope, facial asymmetry, speech difficulty, weakness, light-headedness, numbness and headaches. Psychiatric/Behavioral: Negative for behavioral problems, confusion, hallucinations and sleep disturbance.        Objective:   BP (!) 160/90 (Site: Right Upper Arm, Position: Sitting, Cuff Size: Large Adult)   Wt 295 lb 6.4 oz (134 kg)   BMI 42.39 kg/m² Component Value Date    TRIG 208 03/08/2019    HDL 39 03/08/2019    LDLCALC 88 03/08/2019    LDLDIRECT 104 11/18/2015     Lab Results   Component Value Date    LABAMPH Negative 12/11/2014    BARBSCNU Negative 12/11/2014    LABBENZ Negative 12/11/2014    LABMETH Negative 12/11/2014    OPIATESCREENURINE Positive 12/11/2014    PHENCYCLIDINESCREENURINE Negative 12/11/2014    PPXUR Negative 12/11/2014     No results found for: LITHIUM, DILFRTOT, VALPROATE    Assessment:       Diagnosis Orders   1. Spasmodic torticollis     2. Cervical radiculopathy     3. Lumbar radiculopathy     Radiculopathy and lumbar discopathy with a suggestion of arthritic changes with some degree of dystonia. We had injected the patient with 500 units of Dysport. And patient actually walked out of my room without pain. Was done under EMG guidance. And he is not any neck pain. His back pain has not been bothersome he is under care of Dr. Chrissy Hunter  and is on Humira. At this time not recommended recurrent treatments unless he has recurrent symptoms we will treat him or see him in 1 year. Plan:      No orders of the defined types were placed in this encounter. No orders of the defined types were placed in this encounter. Return in about 1 year (around 1/28/2021).       Anne-Marie Snow MD

## 2021-01-20 PROBLEM — B96.89 ACUTE BACTERIAL SINUSITIS: Status: ACTIVE | Noted: 2021-01-20

## 2021-01-20 PROBLEM — J30.1 ALLERGIC RHINITIS DUE TO POLLEN: Status: ACTIVE | Noted: 2021-01-20

## 2021-01-20 PROBLEM — J01.90 ACUTE BACTERIAL SINUSITIS: Status: ACTIVE | Noted: 2021-01-20

## 2021-01-20 PROBLEM — F32.A DEPRESSIVE DISORDER: Status: ACTIVE | Noted: 2021-01-20

## 2021-01-20 PROBLEM — R61 GENERALIZED HYPERHIDROSIS: Status: ACTIVE | Noted: 2018-05-01

## 2021-01-20 PROBLEM — R00.0 TACHYCARDIA, UNSPECIFIED: Status: ACTIVE | Noted: 2018-05-01

## 2021-01-20 PROBLEM — K62.5 RECTAL HEMORRHAGE: Status: ACTIVE | Noted: 2018-05-01

## 2021-01-20 PROBLEM — R06.00 DYSPNEA, UNSPECIFIED: Status: ACTIVE | Noted: 2018-05-01

## 2021-01-20 PROBLEM — R11.2 NAUSEA WITH VOMITING, UNSPECIFIED: Status: ACTIVE | Noted: 2018-05-01

## 2021-01-20 PROBLEM — R06.02 SHORTNESS OF BREATH: Status: ACTIVE | Noted: 2018-05-01

## 2022-03-15 ENCOUNTER — HOSPITAL ENCOUNTER (OUTPATIENT)
Dept: ULTRASOUND IMAGING | Age: 57
Discharge: HOME | End: 2022-03-17
Payer: COMMERCIAL

## 2022-03-15 DIAGNOSIS — N18.31 CHRONIC KIDNEY DISEASE, STAGE 3A (HCC): ICD-10-CM

## 2022-03-15 PROCEDURE — 76770 US EXAM ABDO BACK WALL COMP: CPT

## 2023-02-11 PROBLEM — E03.9 HYPOTHYROID: Status: ACTIVE | Noted: 2023-02-11

## 2023-02-11 PROBLEM — K21.9 GERD (GASTROESOPHAGEAL REFLUX DISEASE): Status: ACTIVE | Noted: 2023-02-11

## 2023-02-11 PROBLEM — R61 HYPERHIDROSIS: Status: ACTIVE | Noted: 2023-02-11

## 2023-02-11 PROBLEM — M20.41 ACQUIRED HAMMERTOE OF RIGHT FOOT: Status: ACTIVE | Noted: 2023-02-11

## 2023-02-11 PROBLEM — I10 HTN (HYPERTENSION), BENIGN: Status: ACTIVE | Noted: 2023-02-11

## 2023-02-11 PROBLEM — E29.1 HYPOGONADISM MALE: Status: ACTIVE | Noted: 2023-02-11

## 2023-02-11 PROBLEM — I50.22 CHRONIC SYSTOLIC CONGESTIVE HEART FAILURE (MULTI): Status: ACTIVE | Noted: 2023-02-11

## 2023-02-11 PROBLEM — F32.A DEPRESSION: Status: ACTIVE | Noted: 2023-02-11

## 2023-02-11 PROBLEM — G62.9 PN (PERIPHERAL NEUROPATHY): Status: ACTIVE | Noted: 2023-02-11

## 2023-02-11 PROBLEM — I51.4 MYOCARDITIS (MULTI): Status: ACTIVE | Noted: 2023-02-11

## 2023-02-11 PROBLEM — E78.1 HYPERTRIGLYCERIDEMIA: Status: ACTIVE | Noted: 2023-02-11

## 2023-02-11 PROBLEM — N18.9 CHRONIC KIDNEY DISEASE: Status: ACTIVE | Noted: 2023-02-11

## 2023-02-11 PROBLEM — E78.2 HYPERLIPEMIA, MIXED: Status: ACTIVE | Noted: 2023-02-11

## 2023-02-11 PROBLEM — M54.2 NECK PAIN: Status: ACTIVE | Noted: 2023-02-11

## 2023-02-11 PROBLEM — I42.8 NICM (NONISCHEMIC CARDIOMYOPATHY) (MULTI): Status: ACTIVE | Noted: 2023-02-11

## 2023-02-11 PROBLEM — E11.9 TYPE 2 DIABETES MELLITUS WITHOUT COMPLICATION, WITHOUT LONG-TERM CURRENT USE OF INSULIN (MULTI): Status: ACTIVE | Noted: 2023-02-11

## 2023-02-11 PROBLEM — I51.7 LVH (LEFT VENTRICULAR HYPERTROPHY): Status: ACTIVE | Noted: 2023-02-11

## 2023-02-11 PROBLEM — M06.9 RHEUMATOID ARTHRITIS (MULTI): Status: ACTIVE | Noted: 2023-02-11

## 2023-02-11 PROBLEM — G47.33 OBSTRUCTIVE SLEEP APNEA, ADULT: Status: ACTIVE | Noted: 2023-02-11

## 2023-02-11 PROBLEM — N32.81 OVERACTIVE BLADDER: Status: ACTIVE | Noted: 2023-02-11

## 2023-02-11 PROBLEM — D72.9 NEUTROPHILIC LEUKOCYTOSIS: Status: ACTIVE | Noted: 2023-02-11

## 2023-02-11 PROBLEM — I42.0 DILATED IDIOPATHIC CARDIOMYOPATHY (MULTI): Status: ACTIVE | Noted: 2023-02-11

## 2023-02-11 PROBLEM — D72.828 NEUTROPHILIC LEUKOCYTOSIS: Status: ACTIVE | Noted: 2023-02-11

## 2023-02-11 PROBLEM — B35.1 NAIL FUNGUS: Status: ACTIVE | Noted: 2023-02-11

## 2023-02-11 RX ORDER — LANCETS 33 GAUGE
1 EACH MISCELLANEOUS 2 TIMES DAILY
COMMUNITY
Start: 2022-03-03

## 2023-02-11 RX ORDER — LEVOTHYROXINE SODIUM 75 UG/1
1 TABLET ORAL DAILY
COMMUNITY
Start: 2020-05-08 | End: 2023-04-05 | Stop reason: SDUPTHER

## 2023-02-11 RX ORDER — OXYBUTYNIN CHLORIDE 10 MG/1
10 TABLET, EXTENDED RELEASE ORAL DAILY
COMMUNITY
End: 2023-08-22

## 2023-02-11 RX ORDER — EMPAGLIFLOZIN AND LINAGLIPTIN 25; 5 MG/1; MG/1
1 TABLET, FILM COATED ORAL DAILY
COMMUNITY
Start: 2019-06-13 | End: 2023-04-05 | Stop reason: SDUPTHER

## 2023-02-11 RX ORDER — UPADACITINIB 15 MG/1
15 TABLET, EXTENDED RELEASE ORAL DAILY
COMMUNITY
Start: 2021-08-09

## 2023-02-11 RX ORDER — METFORMIN HYDROCHLORIDE 500 MG/1
1 TABLET ORAL 2 TIMES DAILY
COMMUNITY
Start: 2020-06-15 | End: 2023-07-12

## 2023-02-11 RX ORDER — BLOOD SUGAR DIAGNOSTIC
1 STRIP MISCELLANEOUS 2 TIMES DAILY
COMMUNITY
Start: 2022-03-03

## 2023-02-11 RX ORDER — GLYCOPYRROLATE 1 MG/1
2 TABLET ORAL 2 TIMES DAILY
COMMUNITY
End: 2023-04-05 | Stop reason: SDUPTHER

## 2023-02-11 RX ORDER — TESTOSTERONE CYPIONATE 200 MG/ML
200 INJECTION, SOLUTION INTRAMUSCULAR
COMMUNITY
Start: 2020-04-29 | End: 2023-06-12 | Stop reason: SDUPTHER

## 2023-02-11 RX ORDER — CARVEDILOL 25 MG/1
1.5 TABLET ORAL 2 TIMES DAILY
COMMUNITY
Start: 2022-01-03 | End: 2024-06-04 | Stop reason: SDUPTHER

## 2023-02-11 RX ORDER — PANTOPRAZOLE SODIUM 40 MG/1
1 TABLET, DELAYED RELEASE ORAL DAILY
COMMUNITY
Start: 2022-05-09 | End: 2023-04-05 | Stop reason: SDUPTHER

## 2023-02-11 RX ORDER — SACUBITRIL AND VALSARTAN 97; 103 MG/1; MG/1
1 TABLET, FILM COATED ORAL 2 TIMES DAILY
COMMUNITY
Start: 2022-01-03 | End: 2024-01-12 | Stop reason: SDUPTHER

## 2023-02-11 RX ORDER — BACLOFEN 10 MG/1
1 TABLET ORAL 3 TIMES DAILY PRN
COMMUNITY
Start: 2022-07-28 | End: 2023-04-20 | Stop reason: SDUPTHER

## 2023-02-11 RX ORDER — ATORVASTATIN CALCIUM 10 MG/1
1 TABLET, FILM COATED ORAL DAILY
COMMUNITY
Start: 2021-01-15 | End: 2023-04-05 | Stop reason: SDUPTHER

## 2023-02-11 RX ORDER — AMLODIPINE BESYLATE 10 MG/1
1 TABLET ORAL DAILY
COMMUNITY
End: 2024-04-02

## 2023-02-11 RX ORDER — ICOSAPENT ETHYL 1 G/1
2 CAPSULE ORAL 2 TIMES DAILY
COMMUNITY
Start: 2022-10-12 | End: 2023-10-04 | Stop reason: SDUPTHER

## 2023-02-11 RX ORDER — DULOXETIN HYDROCHLORIDE 60 MG/1
1 CAPSULE, DELAYED RELEASE ORAL 2 TIMES DAILY
COMMUNITY
Start: 2020-11-22 | End: 2023-04-05 | Stop reason: SDUPTHER

## 2023-02-11 RX ORDER — SPIRONOLACTONE 50 MG/1
1 TABLET, FILM COATED ORAL DAILY
COMMUNITY
Start: 2022-04-13 | End: 2024-04-02

## 2023-03-29 LAB
ALANINE AMINOTRANSFERASE (SGPT) (U/L) IN SER/PLAS: 23 U/L (ref 10–52)
ALBUMIN (G/DL) IN SER/PLAS: 4.3 G/DL (ref 3.4–5)
ALBUMIN (MG/L) IN URINE: 35.9 MG/L
ALBUMIN/CREATININE (UG/MG) IN URINE: 44.8 UG/MG CRT (ref 0–30)
ALKALINE PHOSPHATASE (U/L) IN SER/PLAS: 24 U/L (ref 33–120)
ANION GAP IN SER/PLAS: 14 MMOL/L (ref 10–20)
ASPARTATE AMINOTRANSFERASE (SGOT) (U/L) IN SER/PLAS: 19 U/L (ref 9–39)
BAND NEUTROPHILS (10*3/UL) BLOOD MANUAL COUNT - WAM: 0.1 X10E9/L (ref 0–0.7)
BASOPHILS (10*3/UL) IN BLOOD BY MANUAL COUNT - WAM: 0 X10E9/L (ref 0–0.1)
BASOPHILS/100 LEUKOCYTES IN BLOOD BY MANUAL COUNT - WAM: 0 % (ref 0–2)
BILIRUBIN TOTAL (MG/DL) IN SER/PLAS: 0.8 MG/DL (ref 0–1.2)
CALCIDIOL (25 OH VITAMIN D3) (NG/ML) IN SER/PLAS: 24 NG/ML
CALCIUM (MG/DL) IN SER/PLAS: 9.3 MG/DL (ref 8.6–10.3)
CARBON DIOXIDE, TOTAL (MMOL/L) IN SER/PLAS: 20 MMOL/L (ref 21–32)
CHLORIDE (MMOL/L) IN SER/PLAS: 102 MMOL/L (ref 98–107)
CHOLESTEROL (MG/DL) IN SER/PLAS: 150 MG/DL (ref 0–199)
CHOLESTEROL IN HDL (MG/DL) IN SER/PLAS: 33.3 MG/DL
CHOLESTEROL/HDL RATIO: 4.5
CREATININE (MG/DL) IN SER/PLAS: 1.65 MG/DL (ref 0.5–1.3)
CREATININE (MG/DL) IN URINE: 80.1 MG/DL (ref 20–370)
EOSINOPHILS (10*3/UL) IN BLOOD BY MANUAL COUNT - WAM: 0 X10E9/L (ref 0–0.7)
EOSINOPHILS/100 LEUKOCYTES IN BLOOD BY MANUAL COUNT - WAM: 0 % (ref 0–6)
ERYTHROCYTE DISTRIBUTION WIDTH (RATIO) BY AUTOMATED COUNT: 13.8 % (ref 11.5–14.5)
ERYTHROCYTE MEAN CORPUSCULAR HEMOGLOBIN CONCENTRATION (G/DL) BY AUTOMATED: 33.4 G/DL (ref 32–36)
ERYTHROCYTE MEAN CORPUSCULAR VOLUME (FL) BY AUTOMATED COUNT: 98 FL (ref 80–100)
ERYTHROCYTES (10*6/UL) IN BLOOD BY AUTOMATED COUNT: 4.48 X10E12/L (ref 4.5–5.9)
ESTIMATED AVERAGE GLUCOSE FOR HBA1C: 160 MG/DL
GFR MALE: 47 ML/MIN/1.73M2
GLUCOSE (MG/DL) IN SER/PLAS: 135 MG/DL (ref 74–99)
HEMATOCRIT (%) IN BLOOD BY AUTOMATED COUNT: 43.7 % (ref 41–52)
HEMOGLOBIN (G/DL) IN BLOOD: 14.6 G/DL (ref 13.5–17.5)
HEMOGLOBIN A1C/HEMOGLOBIN TOTAL IN BLOOD: 7.2 %
IMMATURE GRANULOCYTES/100 LEUKOCYTES IN BLOOD BY AUTOMATED COUNT: 6.9 % (ref 0–0.9)
LDL: 57 MG/DL (ref 0–99)
LEUKOCYTES (10*3/UL) IN BLOOD BY AUTOMATED COUNT: 9.5 X10E9/L (ref 4.4–11.3)
LYMPHOCYTES (10*3/UL) IN BLOOD BY MANUAL COUNT - WAM: 1.81 X10E9/L (ref 1.2–4.8)
LYMPHOCYTES VARIANT/100 LEUKOCYTES IN BLOOD - WAM: 1 % (ref 0–2)
LYMPHOCYTES/100 LEUKOCYTES IN BLOOD BY MANUAL COUNT - WAM: 19 % (ref 13–44)
MANUAL DIFFERENTIAL Y/N: ABNORMAL
METAMYELOCYTES (10*3/UL) IN BLOOD BY MANUAL COUNT - WAM: 0.38 X10E9/L (ref 0–0)
METAMYELOCYTES/100 LEUKOCYTES IN BLOOD BY MANUAL COUNT - WAM: 4 % (ref 0–0)
MONOCYTES (10*3/UL) IN BLOOD BY MANUAL COUNT - WAM: 1.24 X10E9/L (ref 0.1–1)
MONOCYTES/100 LEUKOCYTES IN BLOOD BY MANUAL COUNT - WAM: 13 % (ref 2–10)
MYELOCYTES (10*3/UL) IN BLOOD BY MANUAL COUNT - WAM: 0.19 X10E9/L (ref 0–0)
MYELOCYTES/100 LEUKOCYTES IN BLOOD BY MANUAL COUNT - WAM: 2 % (ref 0–0)
NEUTROPHILS (SEGS+BANDS) (10*3/UL) MANUAL COUNT - WAM: 5.8 X10E9/L (ref 1.2–7.7)
NEUTROPHILS BAND FORM/100 LEUKOCYTES IN BLOOD BY MANUAL COUNT - WAM: 1 % (ref 0–5)
NON HDL CHOLESTEROL: 117 MG/DL
NRBC (PER 100 WBCS) BY AUTOMATED COUNT: 0.3 /100 WBC (ref 0–0)
PARATHYRIN INTACT (PG/ML) IN SER/PLAS: 22.9 PG/ML (ref 18.5–88)
PHOSPHATE (MG/DL) IN SER/PLAS: 5.6 MG/DL (ref 2.5–4.9)
PLATELETS (10*3/UL) IN BLOOD AUTOMATED COUNT: 272 X10E9/L (ref 150–450)
POTASSIUM (MMOL/L) IN SER/PLAS: 4.7 MMOL/L (ref 3.5–5.3)
PROTEIN TOTAL: 7 G/DL (ref 6.4–8.2)
RBC MORPHOLOGY IN BLOOD: NORMAL
SEGMENTED NEUTROPHILS (10*3/UL) BLOOD MANUAL - WAM: 5.7 X10E9/L (ref 1.2–7)
SEGMENTED NEUTROPHILS/100 LEUKOCYTES BY MANUAL COUNT -: 60 % (ref 40–80)
SODIUM (MMOL/L) IN SER/PLAS: 131 MMOL/L (ref 136–145)
THYROTROPIN (MIU/L) IN SER/PLAS BY DETECTION LIMIT <= 0.05 MIU/L: 0.95 MIU/L (ref 0.44–3.98)
TRIGLYCERIDE (MG/DL) IN SER/PLAS: 299 MG/DL (ref 0–149)
UREA NITROGEN (MG/DL) IN SER/PLAS: 49 MG/DL (ref 6–23)
VARIANT LYMPHOCYTES (10*3/UL) BLOOD MANUAL COUNT - WAM: 0.1 X10E9/L (ref 0–0.5)
VLDL: 60 MG/DL (ref 0–40)

## 2023-04-04 ENCOUNTER — OFFICE VISIT (OUTPATIENT)
Dept: PRIMARY CARE | Facility: CLINIC | Age: 58
End: 2023-04-04
Payer: COMMERCIAL

## 2023-04-04 VITALS
HEIGHT: 69 IN | SYSTOLIC BLOOD PRESSURE: 102 MMHG | HEART RATE: 100 BPM | TEMPERATURE: 97.1 F | BODY MASS INDEX: 39.25 KG/M2 | OXYGEN SATURATION: 93 % | DIASTOLIC BLOOD PRESSURE: 64 MMHG | RESPIRATION RATE: 14 BRPM | WEIGHT: 265 LBS

## 2023-04-04 DIAGNOSIS — I42.9 CONGESTIVE HEART FAILURE DUE TO CARDIOMYOPATHY (MULTI): ICD-10-CM

## 2023-04-04 DIAGNOSIS — E78.2 HYPERLIPEMIA, MIXED: ICD-10-CM

## 2023-04-04 DIAGNOSIS — E29.1 HYPOGONADISM MALE: ICD-10-CM

## 2023-04-04 DIAGNOSIS — I10 HTN (HYPERTENSION), BENIGN: ICD-10-CM

## 2023-04-04 DIAGNOSIS — N18.31 STAGE 3A CHRONIC KIDNEY DISEASE (MULTI): ICD-10-CM

## 2023-04-04 DIAGNOSIS — M05.79 RHEUMATOID ARTHRITIS INVOLVING MULTIPLE SITES WITH POSITIVE RHEUMATOID FACTOR (MULTI): ICD-10-CM

## 2023-04-04 DIAGNOSIS — E03.9 ACQUIRED HYPOTHYROIDISM: ICD-10-CM

## 2023-04-04 DIAGNOSIS — I50.9 CONGESTIVE HEART FAILURE DUE TO CARDIOMYOPATHY (MULTI): ICD-10-CM

## 2023-04-04 DIAGNOSIS — E11.9 TYPE 2 DIABETES MELLITUS WITHOUT COMPLICATION, WITHOUT LONG-TERM CURRENT USE OF INSULIN (MULTI): Primary | ICD-10-CM

## 2023-04-04 PROBLEM — I42.0 DILATED IDIOPATHIC CARDIOMYOPATHY (MULTI): Status: RESOLVED | Noted: 2023-02-11 | Resolved: 2023-04-04

## 2023-04-04 PROBLEM — I50.22 CHRONIC SYSTOLIC CONGESTIVE HEART FAILURE (MULTI): Status: RESOLVED | Noted: 2023-02-11 | Resolved: 2023-04-04

## 2023-04-04 PROBLEM — I51.4 MYOCARDITIS (MULTI): Status: RESOLVED | Noted: 2023-02-11 | Resolved: 2023-04-04

## 2023-04-04 PROBLEM — I42.8 NICM (NONISCHEMIC CARDIOMYOPATHY) (MULTI): Status: RESOLVED | Noted: 2023-02-11 | Resolved: 2023-04-04

## 2023-04-04 PROCEDURE — 99214 OFFICE O/P EST MOD 30 MIN: CPT | Performed by: FAMILY MEDICINE

## 2023-04-04 PROCEDURE — 3051F HG A1C>EQUAL 7.0%<8.0%: CPT | Performed by: FAMILY MEDICINE

## 2023-04-04 PROCEDURE — 3078F DIAST BP <80 MM HG: CPT | Performed by: FAMILY MEDICINE

## 2023-04-04 PROCEDURE — 3074F SYST BP LT 130 MM HG: CPT | Performed by: FAMILY MEDICINE

## 2023-04-04 PROCEDURE — 1036F TOBACCO NON-USER: CPT | Performed by: FAMILY MEDICINE

## 2023-04-04 NOTE — PROGRESS NOTES
"Subjective   Patient ID: Toribio Villatoro is a 57 y.o. male who presents for Diabetes, Hypertension, Hyperlipidemia, Hypothyroidism, Depression, and GERD    Diabetes Mellitus:  Reports taking medication as advised and denies side effects. The patient's is checking sugars and reports values to be in expected ranges. Checking blood sugar routinely and denies any hypoglycemic events since last visit.   last eye exam: 11/2022 NOV: 5/2023  last foot exam: TODAY    Hypertension: reports no side effects to prescribed medication. The patient reports good compliance with the medication. The patient is trying to follow a low-salt diet.    Hyperlipidemia: Reports taking medication as advised and without side effects.  The patient is reporting no leg cramping in particular. The patient is trying to follow a low-fat diet.  Hypothyroidism: This patient is here today for review of hypothyroidism. Lab work is reviewed. The patient is taking thyroid replacement hormone without side effects.    Depression: Reports taking medication as prescribed. Patient denies any side effects. Denies suicidal thoughts and attempts. Remains able to perform ADL's. Currently reporting stability in this condition.    GERD: The patient is not reporting any significant heartburn or indigestion. The patient is not getting any symptoms at night. Symptoms are stable so long as medications are taken.      /64   Pulse 100   Temp 36.2 °C (97.1 °F)   Resp 14   Ht 1.753 m (5' 9\")   Wt 120 kg (265 lb)   SpO2 93%   BMI 39.13 kg/m²     Objective   Physical Exam  Vitals reviewed.   Constitutional:       Appearance: Normal appearance. He is obese.   Neck:      Vascular: No carotid bruit.   Cardiovascular:      Rate and Rhythm: Normal rate and regular rhythm.      Pulses: Normal pulses.      Heart sounds: Normal heart sounds.   Pulmonary:      Effort: Pulmonary effort is normal. No respiratory distress.      Breath sounds: Normal breath sounds. No " wheezing.   Abdominal:      General: There is no distension.      Palpations: Abdomen is soft. There is no mass.      Tenderness: There is no abdominal tenderness. There is no right CVA tenderness, left CVA tenderness, guarding or rebound.   Musculoskeletal:         General: Tenderness present.      Cervical back: Normal range of motion and neck supple. No rigidity.      Right lower leg: No edema.      Left lower leg: No edema.      Comments: This patient has no fissures between the toes or open sores on the feet.  The patient has palpable pulses dorsalis pedis and posterior tibial.  On monofilament testing he feels 0 out of 10 on the right foot and 10 out of 10 on the left foot   Lymphadenopathy:      Cervical: No cervical adenopathy.   Neurological:      Mental Status: He is alert.         Labs reviewed: Glucose: 135, Sodium: 131, HDL: 33.3 Cholesterol 150, LDL 57, HgA1c 7.2 from 3/29/2023.     Assessment/Plan   Problem List Items Addressed This Visit          Circulatory    HTN (hypertension), benign    Overview      Is well controlled, continue with current medications.           Congestive heart failure due to cardiomyopathy (CMS/HCC)    Overview      Is well controlled, continue with current medications.              Genitourinary    Chronic kidney disease    Overview      Is well controlled, continue with current medications.              Endocrine/Metabolic    Hypogonadism male    Hypothyroid    Overview      Is well controlled, continue with current medications.           Type 2 diabetes mellitus without complication, without long-term current use of insulin (CMS/HCC) - Primary    Overview     Is clinically stable so we will continue with current medications and lab work to confirm status ordered.          Relevant Orders    Follow Up In [facility] - PCP    CBC and Auto Differential    Comprehensive Metabolic Panel    Lipid Panel    Hemoglobin A1C       Other    Hyperlipemia, mixed    Current  Assessment & Plan      Is well controlled, continue with current medications.         Rheumatoid arthritis (CMS/HCC)    Current Assessment & Plan      Is stable, continue with current treatment.              Follow-up in 4 months with labs prior.    Scribe Attestation  By signing my name below, I, Virginia Vegas   attest that this documentation has been prepared under the direction and in the presence of Gunner Quiroz MD.

## 2023-04-05 DIAGNOSIS — E03.9 ACQUIRED HYPOTHYROIDISM: ICD-10-CM

## 2023-04-05 DIAGNOSIS — E78.2 HYPERLIPEMIA, MIXED: ICD-10-CM

## 2023-04-05 DIAGNOSIS — K21.9 GASTROESOPHAGEAL REFLUX DISEASE WITHOUT ESOPHAGITIS: Primary | ICD-10-CM

## 2023-04-05 DIAGNOSIS — E11.9 TYPE 2 DIABETES MELLITUS WITHOUT COMPLICATION, WITHOUT LONG-TERM CURRENT USE OF INSULIN (MULTI): ICD-10-CM

## 2023-04-05 DIAGNOSIS — F33.9 RECURRENT MAJOR DEPRESSIVE DISORDER, REMISSION STATUS UNSPECIFIED (CMS-HCC): ICD-10-CM

## 2023-04-05 RX ORDER — PANTOPRAZOLE SODIUM 40 MG/1
40 TABLET, DELAYED RELEASE ORAL
Qty: 90 TABLET | Refills: 3 | Status: SHIPPED
Start: 2023-04-05 | End: 2024-01-08 | Stop reason: SDUPTHER

## 2023-04-05 RX ORDER — EMPAGLIFLOZIN AND LINAGLIPTIN 25; 5 MG/1; MG/1
1 TABLET, FILM COATED ORAL DAILY
Qty: 90 TABLET | Refills: 3 | Status: SHIPPED
Start: 2023-04-05 | End: 2024-04-02 | Stop reason: SDUPTHER

## 2023-04-05 RX ORDER — GLYCOPYRROLATE 1 MG/1
2 TABLET ORAL 2 TIMES DAILY
Qty: 360 TABLET | Refills: 3 | Status: SHIPPED
Start: 2023-04-05 | End: 2024-04-02 | Stop reason: SDUPTHER

## 2023-04-05 RX ORDER — LEVOTHYROXINE SODIUM 75 UG/1
75 TABLET ORAL DAILY
Qty: 90 TABLET | Refills: 3 | Status: SHIPPED | OUTPATIENT
Start: 2023-04-05 | End: 2023-04-07

## 2023-04-05 RX ORDER — ATORVASTATIN CALCIUM 10 MG/1
10 TABLET, FILM COATED ORAL DAILY
Qty: 90 TABLET | Refills: 3 | Status: SHIPPED | OUTPATIENT
Start: 2023-04-05 | End: 2024-03-11

## 2023-04-05 RX ORDER — DULOXETIN HYDROCHLORIDE 60 MG/1
60 CAPSULE, DELAYED RELEASE ORAL 2 TIMES DAILY
Qty: 180 CAPSULE | Refills: 3 | Status: SHIPPED
Start: 2023-04-05 | End: 2024-01-08 | Stop reason: SDUPTHER

## 2023-04-05 NOTE — TELEPHONE ENCOUNTER
Rx Refill Request     Name: Toribio Villatoro  :  1965   Medication Name:  DULOXETINE 60MG BID, GLYCOPYRROLATE 1MG 2AM 2PM, GLYAMBI 25-5MG every day, SYNTHROID (NOT LEVOTHYROXINE) 75MCG every day, PANTOPRAZOLE 40MG every day, ATORVASTATIN 10MG QD  Specific Pharmacy location:  [facility]  Date of last appointment:  2023   Date of next appointment:  2023   Best number to reach patient:  [phone]

## 2023-04-06 DIAGNOSIS — E03.9 ACQUIRED HYPOTHYROIDISM: ICD-10-CM

## 2023-04-06 NOTE — TELEPHONE ENCOUNTER
WIFE CALLED AND RX FOR LEVOTHYROXINE WAS SENT INSTEAD OF SYNTHROID.      PLEASE SEND SYNTHROID TO [facility].  PHARMACY IS ALSO SENDING A FAX REGARDING THIS PER WIFE.

## 2023-04-07 ENCOUNTER — PATIENT MESSAGE (OUTPATIENT)
Dept: PRIMARY CARE | Facility: CLINIC | Age: 58
End: 2023-04-07
Payer: COMMERCIAL

## 2023-04-07 DIAGNOSIS — E03.9 ACQUIRED HYPOTHYROIDISM: Primary | ICD-10-CM

## 2023-04-07 RX ORDER — LEVOTHYROXINE SODIUM 75 UG/1
TABLET ORAL
Qty: 90 TABLET | Refills: 3 | Status: SHIPPED
Start: 2023-04-07 | End: 2023-04-10 | Stop reason: ALTCHOICE

## 2023-04-10 RX ORDER — LEVOTHYROXINE SODIUM 75 UG/1
75 TABLET ORAL
Qty: 90 TABLET | Refills: 3 | Status: SHIPPED | OUTPATIENT
Start: 2023-04-10 | End: 2024-03-26

## 2023-04-10 RX ORDER — LEVOTHYROXINE SODIUM 75 UG/1
75 TABLET ORAL
COMMUNITY
Start: 2022-01-31 | End: 2023-04-10 | Stop reason: SDUPTHER

## 2023-04-10 NOTE — TELEPHONE ENCOUNTER
From: Toribio Villatoro  To: Gunner Quiroz MD  Sent: 4/7/2023 3:22 PM EDT  Subject: Synthroid    Dr. Quiroz,. We just got off the phone with [facility] and they have the wrong prescription for synthroid again! They're filling it for the generic which does not work for me. Could you please send in another script being very specific to request name brand only!!! Thank you!

## 2023-04-18 ENCOUNTER — PATIENT MESSAGE (OUTPATIENT)
Dept: PRIMARY CARE | Facility: CLINIC | Age: 58
End: 2023-04-18
Payer: COMMERCIAL

## 2023-04-18 DIAGNOSIS — M54.2 NECK PAIN: Primary | ICD-10-CM

## 2023-04-20 RX ORDER — BACLOFEN 10 MG/1
10 TABLET ORAL 3 TIMES DAILY PRN
Qty: 90 TABLET | Refills: 3 | Status: SHIPPED | OUTPATIENT
Start: 2023-04-20 | End: 2023-11-07

## 2023-04-20 NOTE — TELEPHONE ENCOUNTER
From: Toribio Villatoro  To: Gunner Quiroz MD  Sent: 4/18/2023 6:05 PM EDT  Subject: Baclofen 10mg    Can you please send in a new prescription for my baclofen 10 mg to [facility] on [facility]?    Thank you

## 2023-06-11 ENCOUNTER — PATIENT MESSAGE (OUTPATIENT)
Dept: PRIMARY CARE | Facility: CLINIC | Age: 58
End: 2023-06-11
Payer: COMMERCIAL

## 2023-06-11 DIAGNOSIS — E29.1 HYPOGONADISM MALE: Primary | ICD-10-CM

## 2023-06-12 RX ORDER — TESTOSTERONE CYPIONATE 200 MG/ML
200 INJECTION, SOLUTION INTRAMUSCULAR
Qty: 10 ML | Refills: 2 | Status: SHIPPED | OUTPATIENT
Start: 2023-06-12 | End: 2023-06-15 | Stop reason: SDUPTHER

## 2023-06-14 ENCOUNTER — TELEPHONE (OUTPATIENT)
Dept: PRIMARY CARE | Facility: CLINIC | Age: 58
End: 2023-06-14
Payer: COMMERCIAL

## 2023-06-14 DIAGNOSIS — E29.1 HYPOGONADISM MALE: ICD-10-CM

## 2023-06-14 NOTE — TELEPHONE ENCOUNTER
[facility] called in to get a clarification for Toribio's Testosterone Injection, they said its written for 1ml every 21days but Toribio says he is supposed to be taking 2ml every 21 days. ?    Please advise.

## 2023-06-14 NOTE — LETTER
Shiela 15, 2023     Toribio Villatoro  [address] [place] [id number]    Patient: Toribio Villatoro   YOB: 1965   Date of Visit: 6/14/2023       To Whom It May Concern:    My patient, Toribio Villatoro on 3/29/2023 had a hemoglobin A1C of 7.2%.     Sincerely,     Gunner Quiroz MD

## 2023-06-14 NOTE — TELEPHONE ENCOUNTER
Patient called in stating that his company scheduled him for a DOT without warning. He is requesting a letter stating that his A1C is 7.5-7.2 for this and would like it to be done ASAP. Patient requesting this be done by this afternoon  Please Advise

## 2023-06-15 RX ORDER — TESTOSTERONE CYPIONATE 200 MG/ML
400 INJECTION, SOLUTION INTRAMUSCULAR
Qty: 10 ML | Refills: 1 | Status: SHIPPED | OUTPATIENT
Start: 2023-06-15 | End: 2023-06-26 | Stop reason: SDUPTHER

## 2023-06-26 DIAGNOSIS — E29.1 HYPOGONADISM MALE: Primary | ICD-10-CM

## 2023-06-26 RX ORDER — TESTOSTERONE CYPIONATE 200 MG/ML
400 INJECTION, SOLUTION INTRAMUSCULAR
Qty: 10 ML | Refills: 0 | Status: SHIPPED | OUTPATIENT
Start: 2023-06-26 | End: 2023-10-11

## 2023-07-11 DIAGNOSIS — E11.9 TYPE 2 DIABETES MELLITUS WITHOUT COMPLICATION, WITHOUT LONG-TERM CURRENT USE OF INSULIN (MULTI): ICD-10-CM

## 2023-07-12 RX ORDER — METFORMIN HYDROCHLORIDE 500 MG/1
TABLET ORAL
Qty: 180 TABLET | Refills: 0 | Status: SHIPPED | OUTPATIENT
Start: 2023-07-12 | End: 2023-10-30 | Stop reason: SDUPTHER

## 2023-08-02 ENCOUNTER — LAB (OUTPATIENT)
Dept: LAB | Facility: LAB | Age: 58
End: 2023-08-02
Payer: COMMERCIAL

## 2023-08-02 DIAGNOSIS — E11.9 TYPE 2 DIABETES MELLITUS WITHOUT COMPLICATION, WITHOUT LONG-TERM CURRENT USE OF INSULIN (MULTI): ICD-10-CM

## 2023-08-02 LAB
ALANINE AMINOTRANSFERASE (SGPT) (U/L) IN SER/PLAS: 22 U/L (ref 10–52)
ALBUMIN (G/DL) IN SER/PLAS: 4.5 G/DL (ref 3.4–5)
ALKALINE PHOSPHATASE (U/L) IN SER/PLAS: 28 U/L (ref 33–136)
ANION GAP IN SER/PLAS: 14 MMOL/L (ref 10–20)
ASPARTATE AMINOTRANSFERASE (SGOT) (U/L) IN SER/PLAS: 26 U/L (ref 9–39)
BASOPHILS (10*3/UL) IN BLOOD BY AUTOMATED COUNT: 0.01 X10E9/L (ref 0–0.1)
BASOPHILS/100 LEUKOCYTES IN BLOOD BY AUTOMATED COUNT: 0.2 % (ref 0–2)
BILIRUBIN TOTAL (MG/DL) IN SER/PLAS: 0.7 MG/DL (ref 0–1.2)
CALCIUM (MG/DL) IN SER/PLAS: 8.9 MG/DL (ref 8.6–10.3)
CARBON DIOXIDE, TOTAL (MMOL/L) IN SER/PLAS: 24 MMOL/L (ref 21–32)
CHLORIDE (MMOL/L) IN SER/PLAS: 102 MMOL/L (ref 98–107)
CHOLESTEROL (MG/DL) IN SER/PLAS: 137 MG/DL (ref 0–199)
CHOLESTEROL IN HDL (MG/DL) IN SER/PLAS: 29.7 MG/DL
CHOLESTEROL/HDL RATIO: 4.6
CREATININE (MG/DL) IN SER/PLAS: 1.48 MG/DL (ref 0.5–1.3)
EOSINOPHILS (10*3/UL) IN BLOOD BY AUTOMATED COUNT: 0.03 X10E9/L (ref 0–0.7)
EOSINOPHILS/100 LEUKOCYTES IN BLOOD BY AUTOMATED COUNT: 0.6 % (ref 0–6)
ERYTHROCYTE DISTRIBUTION WIDTH (RATIO) BY AUTOMATED COUNT: 13.1 % (ref 11.5–14.5)
ERYTHROCYTE MEAN CORPUSCULAR HEMOGLOBIN CONCENTRATION (G/DL) BY AUTOMATED: 33.9 G/DL (ref 32–36)
ERYTHROCYTE MEAN CORPUSCULAR VOLUME (FL) BY AUTOMATED COUNT: 97 FL (ref 80–100)
ERYTHROCYTES (10*6/UL) IN BLOOD BY AUTOMATED COUNT: 4.06 X10E12/L (ref 4.5–5.9)
ESTIMATED AVERAGE GLUCOSE FOR HBA1C: 146 MG/DL
GFR MALE: 54 ML/MIN/1.73M2
GLUCOSE (MG/DL) IN SER/PLAS: 76 MG/DL (ref 74–99)
HEMATOCRIT (%) IN BLOOD BY AUTOMATED COUNT: 39.5 % (ref 41–52)
HEMOGLOBIN (G/DL) IN BLOOD: 13.4 G/DL (ref 13.5–17.5)
HEMOGLOBIN A1C/HEMOGLOBIN TOTAL IN BLOOD: 6.7 %
IMMATURE GRANULOCYTES/100 LEUKOCYTES IN BLOOD BY AUTOMATED COUNT: 0.8 % (ref 0–0.9)
LDL: ABNORMAL MG/DL (ref 0–99)
LEUKOCYTES (10*3/UL) IN BLOOD BY AUTOMATED COUNT: 4.8 X10E9/L (ref 4.4–11.3)
LYMPHOCYTES (10*3/UL) IN BLOOD BY AUTOMATED COUNT: 1.56 X10E9/L (ref 1.2–4.8)
LYMPHOCYTES/100 LEUKOCYTES IN BLOOD BY AUTOMATED COUNT: 32.3 % (ref 13–44)
MONOCYTES (10*3/UL) IN BLOOD BY AUTOMATED COUNT: 0.64 X10E9/L (ref 0.1–1)
MONOCYTES/100 LEUKOCYTES IN BLOOD BY AUTOMATED COUNT: 13.3 % (ref 2–10)
NEUTROPHILS (10*3/UL) IN BLOOD BY AUTOMATED COUNT: 2.55 X10E9/L (ref 1.2–7.7)
NEUTROPHILS/100 LEUKOCYTES IN BLOOD BY AUTOMATED COUNT: 52.8 % (ref 40–80)
NON HDL CHOLESTEROL: 107 MG/DL
PLATELETS (10*3/UL) IN BLOOD AUTOMATED COUNT: 251 X10E9/L (ref 150–450)
POTASSIUM (MMOL/L) IN SER/PLAS: 4.7 MMOL/L (ref 3.5–5.3)
PROTEIN TOTAL: 6.8 G/DL (ref 6.4–8.2)
SODIUM (MMOL/L) IN SER/PLAS: 135 MMOL/L (ref 136–145)
TRIGLYCERIDE (MG/DL) IN SER/PLAS: 461 MG/DL (ref 0–149)
UREA NITROGEN (MG/DL) IN SER/PLAS: 25 MG/DL (ref 6–23)
VLDL: ABNORMAL MG/DL (ref 0–40)

## 2023-08-02 PROCEDURE — 80053 COMPREHEN METABOLIC PANEL: CPT

## 2023-08-02 PROCEDURE — 80061 LIPID PANEL: CPT

## 2023-08-02 PROCEDURE — 85025 COMPLETE CBC W/AUTO DIFF WBC: CPT

## 2023-08-02 PROCEDURE — 83721 ASSAY OF BLOOD LIPOPROTEIN: CPT

## 2023-08-02 PROCEDURE — 83036 HEMOGLOBIN GLYCOSYLATED A1C: CPT

## 2023-08-02 PROCEDURE — 36415 COLL VENOUS BLD VENIPUNCTURE: CPT

## 2023-08-03 LAB — CHOLESTEROL IN LDL (MG/DL) IN SER/PLAS BY DIRECT ASSAY: 60 MG/DL (ref 0–129)

## 2023-08-07 NOTE — PROGRESS NOTES
Subjective   Patient ID: Toribio Villatoro is a 58 y.o. male who presents for Diabetes, Hypertension, Hyperlipidemia, Hypothyroidism, GERD, Depression, and Arthritis (Pt is requesting a depo medrol shot and an oral steroid).      Hypertension: The patient is here for follow-up of elevated blood pressure. He is adherent to a low salt diet.  The patient is compliant with medications. Patient denies any side effects to the medications.     Hyperlipidemia: The patient is present today for a follow up of hyperlipidemia. He denies having any leg cramping in particular. He is trying to follow a low-fat diet. The patient is compliant with medications, which they are currently taking the following Atorvastatin 10mg QHS. Patient denies any side effects to the medications.     Hypothyroidism: Patient presents for a follow up of their thyroid function. Energy wise that patient is well. The patient is compliant with their medications, which are currently levothyroxine (Synthroid). Patient denies any side effects to the medications.     Diabetes Mellitus: The patient presents today for a follow up of DM. Patient denies any side effects to the medications.     Chronic Renal Impairment, Stage 3A: This patient has stage IIIA renal insufficiency which has been stable. Patient is encouraged to continue water intake. Patient is encouraged to remain very well hydrated.      Depression: Patient is presenting today for a follow up of depression. He voices that they are doing well. The patient is compliant with medications. Patient denies any side effects to the medications.     GERD  The patient presents today for a follow up of GERD. The patient states that they Yes previous hx of abdominal pain. Pain is typically alleviated by the use ofProtonix.     Rheumatoid arthritis: The patient is present today for a follow up of arthritis. The patient states that their pain is located in multiple joints.  The arthritis is flared up now mostly due  "to the humidity.  He is requesting a shot for settling it down.      The patient denies having the following symptoms: chest pain, chest pressure, fever, chills, N/V/D, constipation, dizziness, headaches, SOB.          Objective   Vitals:  /72   Pulse 94   Temp 36.5 °C (97.7 °F)   Resp 16   Ht 1.778 m (5' 10\")   Wt 129 kg (283 lb 9.6 oz)   SpO2 93%   BMI 40.69 kg/m²     Physical Exam  Vitals reviewed.   Constitutional:       Appearance: Normal appearance. He is obese.   Neck:      Vascular: No carotid bruit.   Cardiovascular:      Rate and Rhythm: Normal rate and regular rhythm.      Pulses: Normal pulses.      Heart sounds: Normal heart sounds.   Pulmonary:      Effort: Pulmonary effort is normal. No respiratory distress.      Breath sounds: Normal breath sounds. No wheezing.   Abdominal:      General: There is no distension.      Palpations: Abdomen is soft. There is no mass.      Tenderness: There is no abdominal tenderness. There is no right CVA tenderness, left CVA tenderness, guarding or rebound.   Musculoskeletal:      Cervical back: Normal range of motion and neck supple. No rigidity.      Right lower leg: No edema.      Left lower leg: No edema.   Lymphadenopathy:      Cervical: No cervical adenopathy.   Neurological:      Mental Status: He is alert.            Labs reviewed from :08/02/23  CMP, CBC, Lipid, HgA1C 6.7 % WNL. Triglycerides 461; GFR 54      Assessment/Plan   Problem List Items Addressed This Visit       Chronic kidney disease     Is clinically stable so we will continue with current medications and lab work to confirm status ordered.          Depression      Is stable, continue with current treatment.          GERD (gastroesophageal reflux disease)      Is well controlled, continue with current medications.          HTN (hypertension), benign - Primary      Is well controlled, continue with current medications.          Relevant Orders    Follow Up In [facility] - PCP - " Established    CBC and Auto Differential    Comprehensive Metabolic Panel    Hyperlipemia, mixed      Is well controlled, continue with current medications.          Relevant Orders    Lipid Panel    Hypothyroid     Is clinically stable so we will continue with current medications and lab work to confirm status ordered.          Relevant Orders    TSH    Rheumatoid arthritis (CMS/HCC)      Is present and symptomatic, will need treatment.          Relevant Medications    methylPREDNISolone (Medrol Dospak) 4 mg tablets    methylPREDNISolone acetate (DEPO-Medrol) injection 80 mg (Completed)    Type 2 diabetes mellitus without complication, without long-term current use of insulin (CMS/HCC)     Is clinically stable so we will continue with current medications and lab work to confirm status ordered.          Relevant Orders    Hemoglobin A1C       Follow up in: 4 months or sooner if needed with labs prior.     Scribe Attestation  By signing my name below, I, Virginia Vegas   attest that this documentation has been prepared under the direction and in the presence of Gunner Quiroz MD.

## 2023-08-08 ENCOUNTER — OFFICE VISIT (OUTPATIENT)
Dept: PRIMARY CARE | Facility: CLINIC | Age: 58
End: 2023-08-08
Payer: COMMERCIAL

## 2023-08-08 VITALS
TEMPERATURE: 97.7 F | BODY MASS INDEX: 40.6 KG/M2 | HEART RATE: 94 BPM | RESPIRATION RATE: 16 BRPM | WEIGHT: 283.6 LBS | DIASTOLIC BLOOD PRESSURE: 72 MMHG | HEIGHT: 70 IN | SYSTOLIC BLOOD PRESSURE: 120 MMHG | OXYGEN SATURATION: 93 %

## 2023-08-08 DIAGNOSIS — M05.79 RHEUMATOID ARTHRITIS INVOLVING MULTIPLE SITES WITH POSITIVE RHEUMATOID FACTOR (MULTI): ICD-10-CM

## 2023-08-08 DIAGNOSIS — E03.9 ACQUIRED HYPOTHYROIDISM: ICD-10-CM

## 2023-08-08 DIAGNOSIS — E11.9 TYPE 2 DIABETES MELLITUS WITHOUT COMPLICATION, WITHOUT LONG-TERM CURRENT USE OF INSULIN (MULTI): ICD-10-CM

## 2023-08-08 DIAGNOSIS — I10 HTN (HYPERTENSION), BENIGN: Primary | ICD-10-CM

## 2023-08-08 DIAGNOSIS — K21.9 GASTROESOPHAGEAL REFLUX DISEASE WITHOUT ESOPHAGITIS: ICD-10-CM

## 2023-08-08 DIAGNOSIS — N18.31 STAGE 3A CHRONIC KIDNEY DISEASE (MULTI): ICD-10-CM

## 2023-08-08 DIAGNOSIS — E78.2 HYPERLIPEMIA, MIXED: ICD-10-CM

## 2023-08-08 DIAGNOSIS — F33.41 RECURRENT MAJOR DEPRESSIVE DISORDER, IN PARTIAL REMISSION (CMS-HCC): ICD-10-CM

## 2023-08-08 PROCEDURE — 96372 THER/PROPH/DIAG INJ SC/IM: CPT | Performed by: FAMILY MEDICINE

## 2023-08-08 PROCEDURE — 3074F SYST BP LT 130 MM HG: CPT | Performed by: FAMILY MEDICINE

## 2023-08-08 PROCEDURE — 1036F TOBACCO NON-USER: CPT | Performed by: FAMILY MEDICINE

## 2023-08-08 PROCEDURE — 99214 OFFICE O/P EST MOD 30 MIN: CPT | Performed by: FAMILY MEDICINE

## 2023-08-08 PROCEDURE — 3078F DIAST BP <80 MM HG: CPT | Performed by: FAMILY MEDICINE

## 2023-08-08 PROCEDURE — 3044F HG A1C LEVEL LT 7.0%: CPT | Performed by: FAMILY MEDICINE

## 2023-08-08 RX ORDER — METHYLPREDNISOLONE 4 MG/1
TABLET ORAL
Qty: 21 TABLET | Refills: 3 | Status: SHIPPED | OUTPATIENT
Start: 2023-08-08 | End: 2023-08-15

## 2023-08-08 RX ORDER — METHYLPREDNISOLONE ACETATE 80 MG/ML
80 INJECTION, SUSPENSION INTRA-ARTICULAR; INTRALESIONAL; INTRAMUSCULAR; SOFT TISSUE ONCE
Status: COMPLETED | OUTPATIENT
Start: 2023-08-08 | End: 2023-08-08

## 2023-08-08 RX ADMIN — METHYLPREDNISOLONE ACETATE 80 MG: 80 INJECTION, SUSPENSION INTRA-ARTICULAR; INTRALESIONAL; INTRAMUSCULAR; SOFT TISSUE at 16:05

## 2023-08-21 DIAGNOSIS — N32.81 OVERACTIVE BLADDER: Primary | ICD-10-CM

## 2023-08-22 RX ORDER — OXYBUTYNIN CHLORIDE 10 MG/1
10 TABLET, EXTENDED RELEASE ORAL DAILY
Qty: 90 TABLET | Refills: 3 | Status: SHIPPED | OUTPATIENT
Start: 2023-08-22

## 2023-08-22 NOTE — TELEPHONE ENCOUNTER
Recent Visits  Date Type Provider Dept   08/08/23 Office Visit Gunner Quiroz MD Do [id number] [facility]   04/04/23 Office Visit Gunner Quiroz MD Do [id number] [facility]   Showing recent visits within past 540 days and meeting all other requirements  Future Appointments  Date Type Provider Dept   12/05/23 Appointment Gunner Quiroz MD Do [id number] [facility]   Showing future appointments within next 180 days and meeting all other requirements

## 2023-09-06 PROBLEM — J30.1 ALLERGIC RHINITIS DUE TO POLLEN: Status: ACTIVE | Noted: 2021-01-20

## 2023-09-06 PROBLEM — G24.3 SPASMODIC TORTICOLLIS: Status: ACTIVE | Noted: 2023-09-06

## 2023-09-06 PROBLEM — I50.22 CHRONIC SYSTOLIC (CONGESTIVE) HEART FAILURE (MULTI): Status: ACTIVE | Noted: 2023-09-06

## 2023-09-06 PROBLEM — R61 EXCESSIVE SWEATING: Status: ACTIVE | Noted: 2021-10-22

## 2023-09-06 PROBLEM — G47.30 SLEEP APNEA: Status: ACTIVE | Noted: 2020-01-28

## 2023-09-06 PROBLEM — M25.559 PAIN IN JOINT INVOLVING PELVIC REGION AND THIGH: Status: ACTIVE | Noted: 2021-06-14

## 2023-09-06 PROBLEM — I12.9 CHRONIC KIDNEY DISEASE DUE TO HYPERTENSION: Status: ACTIVE | Noted: 2023-09-06

## 2023-09-06 PROBLEM — R11.2 NAUSEA WITH VOMITING, UNSPECIFIED: Status: ACTIVE | Noted: 2018-05-01

## 2023-09-06 PROBLEM — M71.9 DISORDER OF BURSAE OF SHOULDER REGION: Status: ACTIVE | Noted: 2021-10-05

## 2023-09-06 PROBLEM — D72.9 NEUTROPHILIA: Status: ACTIVE | Noted: 2021-10-22

## 2023-09-06 PROBLEM — Z78.9 NEVER SMOKED ANY SUBSTANCE: Status: ACTIVE | Noted: 2023-09-06

## 2023-09-06 PROBLEM — R06.02 SHORTNESS OF BREATH: Status: ACTIVE | Noted: 2018-05-01

## 2023-09-06 PROBLEM — N18.32 STAGE 3B CHRONIC KIDNEY DISEASE (MULTI): Status: ACTIVE | Noted: 2023-09-06

## 2023-09-06 PROBLEM — D72.828 NEUTROPHILIA: Status: ACTIVE | Noted: 2021-10-22

## 2023-09-06 PROBLEM — M54.12 CERVICAL RADICULOPATHY: Status: ACTIVE | Noted: 2020-01-28

## 2023-09-06 PROBLEM — D50.0 BLOOD LOSS ANEMIA: Status: ACTIVE | Noted: 2018-05-14

## 2023-09-06 PROBLEM — K62.5 RECTAL HEMORRHAGE: Status: ACTIVE | Noted: 2018-05-01

## 2023-09-06 PROBLEM — R06.00 DYSPNEA, UNSPECIFIED: Status: ACTIVE | Noted: 2018-05-01

## 2023-09-06 PROBLEM — R00.0 TACHYCARDIA, UNSPECIFIED: Status: ACTIVE | Noted: 2018-05-01

## 2023-09-06 PROBLEM — E03.9 ACQUIRED HYPOTHYROIDISM: Status: ACTIVE | Noted: 2021-10-22

## 2023-09-06 PROBLEM — I42.0 DILATED IDIOPATHIC CARDIOMYOPATHY (MULTI): Status: ACTIVE | Noted: 2023-09-06

## 2023-09-06 PROBLEM — R53.83 MALAISE AND FATIGUE: Status: ACTIVE | Noted: 2021-10-22

## 2023-09-06 PROBLEM — D64.9 ANEMIA, UNSPECIFIED: Status: ACTIVE | Noted: 2018-05-01

## 2023-09-06 PROBLEM — R53.81 MALAISE AND FATIGUE: Status: ACTIVE | Noted: 2021-10-22

## 2023-09-06 PROBLEM — M54.16 LUMBAR RADICULOPATHY: Status: ACTIVE | Noted: 2020-01-28

## 2023-09-06 PROBLEM — E66.01 MORBID OBESITY WITH BMI OF 40.0-44.9, ADULT (MULTI): Status: ACTIVE | Noted: 2023-09-06

## 2023-09-06 PROBLEM — E11.21 DIABETIC NEPHROPATHY (MULTI): Status: ACTIVE | Noted: 2023-09-06

## 2023-09-06 PROBLEM — M06.9 RHEUMATOID ARTHRITIS INVOLVING MULTIPLE JOINTS (MULTI): Status: ACTIVE | Noted: 2023-09-06

## 2023-09-06 PROBLEM — M06.09 RHEUMATOID ARTHRITIS OF MULTIPLE SITES WITH NEGATIVE RHEUMATOID FACTOR (MULTI): Status: ACTIVE | Noted: 2021-10-22

## 2023-09-06 RX ORDER — SYRINGE WITH NEEDLE, 1 ML 25GX5/8"
3 SYRINGE, EMPTY DISPOSABLE MISCELLANEOUS
COMMUNITY
End: 2023-11-24 | Stop reason: SDUPTHER

## 2023-09-06 RX ORDER — INSULIN PUMP SYRINGE, 3 ML
1 EACH MISCELLANEOUS AS NEEDED
COMMUNITY

## 2023-09-06 RX ORDER — SYRINGE, DISPOSABLE, 3 ML
SYRINGE, EMPTY DISPOSABLE MISCELLANEOUS
COMMUNITY
End: 2023-11-24 | Stop reason: SDUPTHER

## 2023-09-28 LAB
ALBUMIN (G/DL) IN SER/PLAS: 4.4 G/DL (ref 3.4–5)
ALBUMIN (MG/L) IN URINE: NORMAL
ALBUMIN/CREATININE (UG/MG) IN URINE: NORMAL
ANION GAP IN SER/PLAS: 14 MMOL/L (ref 10–20)
CALCIDIOL (25 OH VITAMIN D3) (NG/ML) IN SER/PLAS: 30 NG/ML
CALCIUM (MG/DL) IN SER/PLAS: 9.1 MG/DL (ref 8.6–10.3)
CARBON DIOXIDE, TOTAL (MMOL/L) IN SER/PLAS: 21 MMOL/L (ref 21–32)
CHLORIDE (MMOL/L) IN SER/PLAS: 103 MMOL/L (ref 98–107)
CREATININE (MG/DL) IN SER/PLAS: 1.56 MG/DL (ref 0.5–1.3)
CREATININE (MG/DL) IN URINE: NORMAL
ERYTHROCYTE DISTRIBUTION WIDTH (RATIO) BY AUTOMATED COUNT: 13.9 % (ref 11.5–14.5)
ERYTHROCYTE MEAN CORPUSCULAR HEMOGLOBIN CONCENTRATION (G/DL) BY AUTOMATED: 33.1 G/DL (ref 32–36)
ERYTHROCYTE MEAN CORPUSCULAR VOLUME (FL) BY AUTOMATED COUNT: 99 FL (ref 80–100)
ERYTHROCYTES (10*6/UL) IN BLOOD BY AUTOMATED COUNT: 4.15 X10E12/L (ref 4.5–5.9)
ESTIMATED AVERAGE GLUCOSE FOR HBA1C: 154 MG/DL
GFR MALE: 50 ML/MIN/1.73M2
GLUCOSE (MG/DL) IN SER/PLAS: 163 MG/DL (ref 74–99)
HEMATOCRIT (%) IN BLOOD BY AUTOMATED COUNT: 41.1 % (ref 41–52)
HEMOGLOBIN (G/DL) IN BLOOD: 13.6 G/DL (ref 13.5–17.5)
HEMOGLOBIN A1C/HEMOGLOBIN TOTAL IN BLOOD: 7 %
LEUKOCYTES (10*3/UL) IN BLOOD BY AUTOMATED COUNT: 5.3 X10E9/L (ref 4.4–11.3)
PARATHYRIN INTACT (PG/ML) IN SER/PLAS: 29.4 PG/ML (ref 18.5–88)
PHOSPHATE (MG/DL) IN SER/PLAS: 4.8 MG/DL (ref 2.5–4.9)
PLATELETS (10*3/UL) IN BLOOD AUTOMATED COUNT: 240 X10E9/L (ref 150–450)
POTASSIUM (MMOL/L) IN SER/PLAS: 4.3 MMOL/L (ref 3.5–5.3)
SODIUM (MMOL/L) IN SER/PLAS: 134 MMOL/L (ref 136–145)
THYROTROPIN (MIU/L) IN SER/PLAS BY DETECTION LIMIT <= 0.05 MIU/L: 0.74 MIU/L (ref 0.44–3.98)
UREA NITROGEN (MG/DL) IN SER/PLAS: 34 MG/DL (ref 6–23)

## 2023-09-29 LAB
ALBUMIN (MG/L) IN URINE: 17.3 MG/L
ALBUMIN/CREATININE (UG/MG) IN URINE: 22.3 UG/MG CRT (ref 0–30)
CREATININE (MG/DL) IN URINE: 77.6 MG/DL (ref 20–370)

## 2023-10-04 DIAGNOSIS — E78.2 HYPERLIPEMIA, MIXED: ICD-10-CM

## 2023-10-04 DIAGNOSIS — E78.1 HYPERTRIGLYCERIDEMIA: ICD-10-CM

## 2023-10-04 RX ORDER — ICOSAPENT ETHYL 1 G/1
2 CAPSULE ORAL
Qty: 360 CAPSULE | Refills: 3 | Status: SHIPPED
Start: 2023-10-04 | End: 2024-01-12 | Stop reason: SDUPTHER

## 2023-10-11 ENCOUNTER — OFFICE VISIT (OUTPATIENT)
Dept: CARDIOLOGY | Facility: CLINIC | Age: 58
End: 2023-10-11
Payer: COMMERCIAL

## 2023-10-11 VITALS
DIASTOLIC BLOOD PRESSURE: 58 MMHG | BODY MASS INDEX: 39.27 KG/M2 | HEIGHT: 70 IN | HEART RATE: 76 BPM | WEIGHT: 274.3 LBS | SYSTOLIC BLOOD PRESSURE: 98 MMHG

## 2023-10-11 DIAGNOSIS — I50.22 CHRONIC SYSTOLIC (CONGESTIVE) HEART FAILURE (MULTI): ICD-10-CM

## 2023-10-11 DIAGNOSIS — I10 HTN (HYPERTENSION), BENIGN: ICD-10-CM

## 2023-10-11 DIAGNOSIS — E78.2 HYPERLIPEMIA, MIXED: ICD-10-CM

## 2023-10-11 DIAGNOSIS — N18.32 STAGE 3B CHRONIC KIDNEY DISEASE (MULTI): ICD-10-CM

## 2023-10-11 DIAGNOSIS — Z78.9 NEVER SMOKED ANY SUBSTANCE: ICD-10-CM

## 2023-10-11 DIAGNOSIS — G47.33 OBSTRUCTIVE SLEEP APNEA, ADULT: ICD-10-CM

## 2023-10-11 DIAGNOSIS — I42.8 NICM (NONISCHEMIC CARDIOMYOPATHY) (MULTI): ICD-10-CM

## 2023-10-11 DIAGNOSIS — E11.9 TYPE 2 DIABETES MELLITUS WITHOUT COMPLICATION, WITHOUT LONG-TERM CURRENT USE OF INSULIN (MULTI): Primary | ICD-10-CM

## 2023-10-11 DIAGNOSIS — E29.1 HYPOGONADISM MALE: ICD-10-CM

## 2023-10-11 PROCEDURE — 3051F HG A1C>EQUAL 7.0%<8.0%: CPT | Performed by: INTERNAL MEDICINE

## 2023-10-11 PROCEDURE — 3008F BODY MASS INDEX DOCD: CPT | Performed by: INTERNAL MEDICINE

## 2023-10-11 PROCEDURE — 1036F TOBACCO NON-USER: CPT | Performed by: INTERNAL MEDICINE

## 2023-10-11 PROCEDURE — 3074F SYST BP LT 130 MM HG: CPT | Performed by: INTERNAL MEDICINE

## 2023-10-11 PROCEDURE — 3078F DIAST BP <80 MM HG: CPT | Performed by: INTERNAL MEDICINE

## 2023-10-11 PROCEDURE — 99214 OFFICE O/P EST MOD 30 MIN: CPT | Performed by: INTERNAL MEDICINE

## 2023-10-11 RX ORDER — TESTOSTERONE CYPIONATE 200 MG/ML
INJECTION, SOLUTION INTRAMUSCULAR
Qty: 8 ML | Refills: 1 | Status: SHIPPED | OUTPATIENT
Start: 2023-10-11 | End: 2024-01-08

## 2023-10-11 RX ORDER — FENOFIBRATE 160 MG/1
160 TABLET ORAL DAILY
Qty: 90 TABLET | Refills: 3 | Status: SHIPPED | OUTPATIENT
Start: 2023-10-11 | End: 2024-01-12 | Stop reason: SDUPTHER

## 2023-10-11 NOTE — TELEPHONE ENCOUNTER
Recent Visits  Date Type Provider Dept   08/08/23 Office Visit Gunner Quiroz MD Do [id number] [facility]   04/04/23 Office Visit Gunner Quiroz MD Do [id number] [facility]   Showing recent visits within past 540 days and meeting all other requirements  Future Appointments  Date Type Provider Dept   12/05/23 Appointment Gunner Quiroz MD Do [id number] [facility]   Showing future appointments within next 180 days and meeting all other requirements

## 2023-10-11 NOTE — PROGRESS NOTES
Patient:  Toribio Villatoro  YOB: 1965  MRN: [id number]       Chief Complaint/Active Symptoms:       Toribio Villatoro is a 58 y.o. male who returns today for cardiac follow-up.      Objective:   History of present illness:  Patient is a 58-year-old  male with past medical history significant for dilated nonischemic cardiomyopathy EF 15-20%, chronic systolic congestive heart failure, hypertension, left ventricular hypertrophy, hyperlipidemia, diabetes mellitus, chronic kidney disease, obstructive sleep apnea on CPAP, obesity, rheumatoid arthritis presents for follow-up cardiovascular care.    Patient denies chest pain, dyspnea, palpitations.  Patient has occasional nausea when his rheumatoid arthritis pain is more severe precipitated by increase humidity.  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  He is compliant with his CPAP.      Past medical history:  As above plus  Migraine headaches  Hypothyroid  Rheumatoid arthritis  Depression  Gastroesophageal reflux disease  Hypogonadism  Diabetes mellitus  Obstructive sleep apnea on CPAP    Social history:  Lifetime non-smoker  Occasional alcohol use    Family history:  Mother history of coronary artery disease in late 50s/early 60s with eventual coronary artery bypass  Brother history of coronary disease, myocardial infarction    Review of systems have been reviewed and are negative, noncontributory, or as previously mentioned x12 systems.  Vitals:    10/11/23 1531   BP: 98/58   Pulse: 76       Vitals:    10/11/23 1531   Weight: 124 kg (274 lb 4.8 oz)       Allergies:     Allergies   Allergen Reactions    Allopurinol Unknown    Lisinopril Hallucinations and Other          Medications:     Current Outpatient Medications   Medication Instructions    amLODIPine (Norvasc) 10 mg tablet 1 tablet, oral, Daily    atorvastatin (LIPITOR) 10 mg, oral, Daily    baclofen (LIORESAL) 10 mg, oral, 3 times daily PRN    blood glucose  "control, low solution miscellaneous, Use as directed    blood sugar diagnostic (Contour Next Test Strips) strip 1 strip, 2 times daily    carvedilol (Coreg) 25 mg tablet 1.5 tablets, oral, 2 times daily    diabetic supplies, miscellan. misc miscellaneous, 2 times daily, CONTOUR NEXT MONIITOR W/ DEVICE KIT, Use as directed to test blood glucose levels two times daily    DULoxetine (CYMBALTA) 60 mg, oral, 2 times daily    empagliflozin-linagliptin (Glyxambi) 25-5 mg 1 tablet, oral, Daily    FreeStyle glucose monitoring kit 1 each, miscellaneous, As needed    glycopyrrolate (ROBINUL) 2 mg, oral, 2 times daily    icosapent ethyL (VASCEPA) 2 g, oral, 2 times daily with meals    lancets 33 gauge misc 1 Lancet, 2 times daily    metFORMIN (Glucophage) 500 mg tablet Take 1 tablet by mouth twice daily    multivit-min/iron/folic acid/K (MULTI-DAY PLUS MINERALS ORAL) 1 tablet, oral, Daily    NON FORMULARY CONTOUR NEXT CONTROL normal in vitro solution, use as directed    oxybutynin XL (DITROPAN-XL) 10 mg, oral, Daily    pantoprazole (PROTONIX) 40 mg, oral, Daily before breakfast    Rinvoq 15 mg, oral, Daily    sacubitriL-valsartan (Entresto)  mg tablet 1 tablet, oral, 2 times daily    spironolactone (Aldactone) 50 mg tablet 1 tablet, oral, Daily    Synthroid 75 mcg, oral, Daily RT    syringe with needle (BD Luer-Kate Syringe) 3 mL 18 x 1 1/2\" syringe 3 mL, miscellaneous, Use as directed    syringe, disposable, (BD Luer-Kate Syringe) 3 mL syringe miscellaneous, Every 14 days, 22G x 1' 3ML, Use as directed every other week for injections    testosterone cypionate (Depo-Testosterone) 200 mg/mL injection INJECT 2 ML INTO THE SHOULDER, THIGH, OR BUTTOCKS EVERY 21 DAYS.       Physical Examination:   GENERAL:  Well developed, well nourished, in no acute distress.  CHEST:  Symmetric and nontender.  NEURO/PSYCH:  Alert and oriented times three with approppriate behavior and responses.  NECK:  Supple, no JVD, no bruit.  LUNGS:  Clear " "to auscultation bilaterally, normal respiratory effort.  HEART:  Rate and rhythm regular with no evident murmur, no gallop appreciated.        There are no rubs, clicks or heaves.  EXTREMITIES:  Warm with good color, no clubbing or cyanosis.  There is bilateral trace pretibial edema noted.  PERIPHERAL VASCULAR:  Pulses present and equally palpable; 2+ throughout.      Lab:     CBC:   Lab Results   Component Value Date    WBC 5.3 09/28/2023    RBC 4.15 (L) 09/28/2023    HGB 13.6 09/28/2023    HCT 41.1 09/28/2023     09/28/2023        CMP:    Lab Results   Component Value Date     (L) 09/28/2023    K 4.3 09/28/2023     09/28/2023    CO2 21 09/28/2023    BUN 34 (H) 09/28/2023    CREATININE 1.56 (H) 09/28/2023    GLUCOSE 163 (H) 09/28/2023    CALCIUM 9.1 09/28/2023       Magnesium:    No results found for: \"MG\"    Lipid Profile:    Lab Results   Component Value Date    TRIG 461 (H) 08/02/2023    HDL 29.7 (A) 08/02/2023    LDLDIRECT 60 08/02/2023       TSH:    Lab Results   Component Value Date    TSH 0.74 09/28/2023       BNP:   Lab Results   Component Value Date     (H) 12/30/2021        PT/INR:    Lab Results   Component Value Date    PROTIME 11.0 12/30/2021    INR 1.0 12/30/2021       HgBA1c:    Lab Results   Component Value Date    HGBA1C 7.0 (A) 09/28/2023       BMP:  Lab Results   Component Value Date     (L) 09/28/2023     (L) 08/02/2023     (L) 03/29/2023    K 4.3 09/28/2023    K 4.7 08/02/2023    K 4.7 03/29/2023     09/28/2023     08/02/2023     03/29/2023    CO2 21 09/28/2023    CO2 24 08/02/2023    CO2 20 (L) 03/29/2023    BUN 34 (H) 09/28/2023    BUN 25 (H) 08/02/2023    BUN 49 (H) 03/29/2023    CREATININE 1.56 (H) 09/28/2023    CREATININE 1.48 (H) 08/02/2023    CREATININE 1.65 (H) 03/29/2023       CBC:  Lab Results   Component Value Date    WBC 5.3 09/28/2023    WBC 4.8 08/02/2023    WBC 9.5 03/29/2023    RBC 4.15 (L) 09/28/2023    RBC 4.06 (L) " "08/02/2023    RBC 4.48 (L) 03/29/2023    HGB 13.6 09/28/2023    HGB 13.4 (L) 08/02/2023    HGB 14.6 03/29/2023    HCT 41.1 09/28/2023    HCT 39.5 (L) 08/02/2023    HCT 43.7 03/29/2023    MCV 99 09/28/2023    MCV 97 08/02/2023    MCV 98 03/29/2023    MCHC 33.1 09/28/2023    MCHC 33.9 08/02/2023    MCHC 33.4 03/29/2023    RDW 13.9 09/28/2023    RDW 13.1 08/02/2023    RDW 13.8 03/29/2023     09/28/2023     08/02/2023     03/29/2023       Cardiac Enzymes:    No results found for: \"TROPHS\"    Hepatic Function Panel:    Lab Results   Component Value Date    ALKPHOS 28 (L) 08/02/2023    ALT 22 08/02/2023    AST 26 08/02/2023    PROT 6.8 08/02/2023    BILITOT 0.7 08/02/2023         Diagnostic Studies:     Electrocardiogram 12 Lead  Date: 2/15/2023  Sinus tachycardia Biatrial enlargement Left ventricular hypertrophy with repolarization abnormality Abnormal ECG When compared with ECG of 01-MAY-2018 11:09, ST elevation now present in Anterior leads T wave inversion less evident in Inferior leads T wave inversion now evident in Lateral leads       The following cardiovascular studies have been updated and reviewed    Labs 8/2/23  , , HDL 29, LDL 60        Cardiac cath 12/30/21  1. Normal coronaries  2. EF 15%    Echo 12/30/21  1. Mild dilation of the ascending aorta  2. EF 15-20%     Cardiac MRI 4/6/22  1.Dilated left ventricle. 39%  2.RVEF 43%  3.Moderate enhancement imaging is normal           Patient Active Problem List   Diagnosis    Acquired hammertoe of right foot    Chronic kidney disease    Depression    GERD (gastroesophageal reflux disease)    HTN (hypertension), benign    Hyperhidrosis    Hyperlipemia, mixed    Hypertriglyceridemia    Hypogonadism male    Hypothyroid    LVH (left ventricular hypertrophy)    Nail fungus    Neck pain    Neutrophilic leukocytosis    Obstructive sleep apnea, adult    Overactive bladder    PN (peripheral neuropathy)    Rheumatoid arthritis (CMS/HCC)    " Type 2 diabetes mellitus without complication, without long-term current use of insulin (CMS/[facility])    Congestive heart failure due to cardiomyopathy (CMS/[facility])    BMI 40.0-44.9, adult (CMS/[facility])    Allergic rhinitis due to pollen    Anemia, unspecified    Blood loss anemia    Cervical radiculopathy    Lumbar radiculopathy    Diabetic nephropathy (CMS/[facility])    Disorder of bursae of shoulder region    Dyspnea, unspecified    Shortness of breath    Excessive sweating    Malaise and fatigue    Nausea with vomiting, unspecified    Pain in joint involving pelvic region and thigh    Rectal hemorrhage    Sleep apnea    Spasmodic torticollis    Stage 3b chronic kidney disease (CMS/[facility])    Tachycardia, unspecified    Chronic kidney disease due to hypertension    Acquired hypothyroidism    Neutrophilia    Rheumatoid arthritis involving multiple joints (CMS/[facility])    Rheumatoid arthritis of multiple sites with negative rheumatoid factor (CMS/HCC)    Chronic systolic (congestive) heart failure (CMS/HCC)    Dilated idiopathic cardiomyopathy (CMS/HCC)    Morbid obesity with BMI of 40.0-44.9, adult (CMS/HCC)    Never smoked any substance         ASSESSMENT   Dilated nonischemic cardiomyopathy EF 39%  RVEF 43%  Chronic biventricular systolic congestive heart failure New York Heart Association class II-III  Hypertension  Left ventricular hypertrophy  Hyperlipidemia  Diabetes mellitus  Chronic kidney disease- Dr Handley  Obstructive sleep apnea and CPAP  Obesity  Rheumatoid arthritis.      PLAN     Patient appears to be stable from a cardiac standpoint. He remains compensated from a heart failure standpoint. Symptoms of angina. No symptomatic arrhythmia. For elevated triglycerides we will add fenofibrate 160 mg daily Advise diet and weight loss. Routine lab work through PCP. Follow-up with myself in 1 year. Refer to dietitian for diabetes and hypertriglyceridemia.    Please excuse any errors in grammar or translation related to this  dictation. Voice recognition software was utilized to prepare this document.

## 2023-10-30 DIAGNOSIS — E11.9 TYPE 2 DIABETES MELLITUS WITHOUT COMPLICATION, WITHOUT LONG-TERM CURRENT USE OF INSULIN (MULTI): ICD-10-CM

## 2023-10-30 DIAGNOSIS — E11.9 TYPE 2 DIABETES MELLITUS WITHOUT COMPLICATION, WITHOUT LONG-TERM CURRENT USE OF INSULIN (MULTI): Primary | ICD-10-CM

## 2023-10-30 RX ORDER — METFORMIN HYDROCHLORIDE 500 MG/1
TABLET ORAL
Qty: 180 TABLET | Refills: 3 | Status: SHIPPED | OUTPATIENT
Start: 2023-10-30

## 2023-10-30 RX ORDER — METFORMIN HYDROCHLORIDE 500 MG/1
500 TABLET ORAL 2 TIMES DAILY
Qty: 180 TABLET | Refills: 0 | Status: SHIPPED
Start: 2023-10-30 | End: 2023-10-30 | Stop reason: SDUPTHER

## 2023-10-30 NOTE — TELEPHONE ENCOUNTER
Rx Refill Request Telephone Encounter    Name:  Toribio Siddiqide  :  [id number]  Medication Name:  METFORMIN 500MG   Specific Pharmacy location:  [facility]    Date of last appointment:  23  Date of next appointment:  23  Best number to reach patient:  [phone]

## 2023-10-30 NOTE — TELEPHONE ENCOUNTER
Rx Refill Request     Name: Toribio ZACARIAS Devangde  :  1965   Medication Name:  Metformin (Glucophage) 500 mg Tablet   Specific Pharmacy location:  [facility]   Date of last appointment:  2023   Date of next appointment: 2023  Best number to reach patient:  [phone]

## 2023-11-07 DIAGNOSIS — M54.2 NECK PAIN: ICD-10-CM

## 2023-11-07 RX ORDER — BACLOFEN 10 MG/1
10 TABLET ORAL 3 TIMES DAILY PRN
Qty: 90 TABLET | Refills: 3 | Status: SHIPPED | OUTPATIENT
Start: 2023-11-07

## 2023-11-07 NOTE — TELEPHONE ENCOUNTER
Recent Visits  Date Type Provider Dept   08/08/23 Office Visit Gunner Quiroz MD Do [id number] [facility]   04/04/23 Office Visit Gunner Quiroz MD Do [id number] [facility]   Showing recent visits within past 540 days and meeting all other requirements  Future Appointments  Date Type Provider Dept   12/05/23 Appointment Gunner Quiroz MD Do [id number] [facility]   Showing future appointments within next 180 days and meeting all other requirements

## 2023-11-24 DIAGNOSIS — E29.1 HYPOGONADISM MALE: Primary | ICD-10-CM

## 2023-11-24 NOTE — TELEPHONE ENCOUNTER
----- Message from Toribio Villatoro sent at 11/23/2023  1:08 PM EST -----  Regarding: Syringes  Contact: [phone]  Can you please send in a new script for the 18 and 22 gauge syringes to [facility] on [address] [facility].

## 2023-11-26 RX ORDER — SYRINGE WITH NEEDLE, 1 ML 25GX5/8"
3 SYRINGE, EMPTY DISPOSABLE MISCELLANEOUS
Qty: 10 EACH | Refills: 5 | Status: SHIPPED | OUTPATIENT
Start: 2023-11-26 | End: 2024-03-06 | Stop reason: SDUPTHER

## 2023-11-26 RX ORDER — SYRINGE, DISPOSABLE, 3 ML
1 SYRINGE, EMPTY DISPOSABLE MISCELLANEOUS
Qty: 10 EACH | Refills: 5 | Status: SHIPPED | OUTPATIENT
Start: 2023-11-26 | End: 2024-03-06 | Stop reason: SDUPTHER

## 2023-11-27 ENCOUNTER — LAB (OUTPATIENT)
Dept: LAB | Facility: LAB | Age: 58
End: 2023-11-27
Payer: COMMERCIAL

## 2023-11-27 DIAGNOSIS — E78.2 HYPERLIPEMIA, MIXED: ICD-10-CM

## 2023-11-27 DIAGNOSIS — I10 HTN (HYPERTENSION), BENIGN: ICD-10-CM

## 2023-11-27 DIAGNOSIS — E11.9 TYPE 2 DIABETES MELLITUS WITHOUT COMPLICATION, WITHOUT LONG-TERM CURRENT USE OF INSULIN (MULTI): ICD-10-CM

## 2023-11-27 DIAGNOSIS — E03.9 ACQUIRED HYPOTHYROIDISM: ICD-10-CM

## 2023-11-27 LAB
ALBUMIN SERPL BCP-MCNC: 4.5 G/DL (ref 3.4–5)
ALP SERPL-CCNC: 23 U/L (ref 33–136)
ALT SERPL W P-5'-P-CCNC: 26 U/L (ref 10–52)
ANION GAP SERPL CALC-SCNC: 12 MMOL/L (ref 10–20)
AST SERPL W P-5'-P-CCNC: 18 U/L (ref 9–39)
BASOPHILS # BLD AUTO: 0.03 X10*3/UL (ref 0–0.1)
BASOPHILS NFR BLD AUTO: 0.3 %
BILIRUB SERPL-MCNC: 0.5 MG/DL (ref 0–1.2)
BUN SERPL-MCNC: 43 MG/DL (ref 6–23)
CALCIUM SERPL-MCNC: 9.6 MG/DL (ref 8.6–10.3)
CHLORIDE SERPL-SCNC: 98 MMOL/L (ref 98–107)
CHOLEST SERPL-MCNC: 156 MG/DL (ref 0–199)
CHOLESTEROL/HDL RATIO: 4.2
CO2 SERPL-SCNC: 27 MMOL/L (ref 21–32)
CREAT SERPL-MCNC: 1.75 MG/DL (ref 0.5–1.3)
EOSINOPHIL # BLD AUTO: 0.01 X10*3/UL (ref 0–0.7)
EOSINOPHIL NFR BLD AUTO: 0.1 %
ERYTHROCYTE [DISTWIDTH] IN BLOOD BY AUTOMATED COUNT: 13.2 % (ref 11.5–14.5)
GFR SERPL CREATININE-BSD FRML MDRD: 44 ML/MIN/1.73M*2
GLUCOSE SERPL-MCNC: 126 MG/DL (ref 74–99)
HCT VFR BLD AUTO: 43.3 % (ref 41–52)
HDLC SERPL-MCNC: 37.1 MG/DL
HGB BLD-MCNC: 14.4 G/DL (ref 13.5–17.5)
IMM GRANULOCYTES # BLD AUTO: 0.25 X10*3/UL (ref 0–0.7)
IMM GRANULOCYTES NFR BLD AUTO: 2.5 % (ref 0–0.9)
LDLC SERPL CALC-MCNC: 62 MG/DL
LYMPHOCYTES # BLD AUTO: 2.01 X10*3/UL (ref 1.2–4.8)
LYMPHOCYTES NFR BLD AUTO: 20 %
MCH RBC QN AUTO: 33 PG (ref 26–34)
MCHC RBC AUTO-ENTMCNC: 33.3 G/DL (ref 32–36)
MCV RBC AUTO: 99 FL (ref 80–100)
MONOCYTES # BLD AUTO: 0.84 X10*3/UL (ref 0.1–1)
MONOCYTES NFR BLD AUTO: 8.3 %
NEUTROPHILS # BLD AUTO: 6.92 X10*3/UL (ref 1.2–7.7)
NEUTROPHILS NFR BLD AUTO: 68.8 %
NON HDL CHOLESTEROL: 119 MG/DL (ref 0–149)
NRBC BLD-RTO: 0 /100 WBCS (ref 0–0)
PLATELET # BLD AUTO: 264 X10*3/UL (ref 150–450)
POTASSIUM SERPL-SCNC: 4.9 MMOL/L (ref 3.5–5.3)
PROT SERPL-MCNC: 7 G/DL (ref 6.4–8.2)
RBC # BLD AUTO: 4.37 X10*6/UL (ref 4.5–5.9)
SODIUM SERPL-SCNC: 132 MMOL/L (ref 136–145)
TRIGL SERPL-MCNC: 286 MG/DL (ref 0–149)
TSH SERPL-ACNC: 1.3 MIU/L (ref 0.44–3.98)
VLDL: 57 MG/DL (ref 0–40)
WBC # BLD AUTO: 10.1 X10*3/UL (ref 4.4–11.3)

## 2023-11-27 PROCEDURE — 84443 ASSAY THYROID STIM HORMONE: CPT

## 2023-11-27 PROCEDURE — 80061 LIPID PANEL: CPT

## 2023-11-27 PROCEDURE — 36415 COLL VENOUS BLD VENIPUNCTURE: CPT

## 2023-11-27 PROCEDURE — 85025 COMPLETE CBC W/AUTO DIFF WBC: CPT

## 2023-11-27 PROCEDURE — 83036 HEMOGLOBIN GLYCOSYLATED A1C: CPT

## 2023-11-27 PROCEDURE — 80053 COMPREHEN METABOLIC PANEL: CPT

## 2023-11-28 LAB
EST. AVERAGE GLUCOSE BLD GHB EST-MCNC: 171 MG/DL
HBA1C MFR BLD: 7.6 %

## 2023-12-04 PROBLEM — E03.9 HYPOTHYROID: Status: RESOLVED | Noted: 2023-02-11 | Resolved: 2023-12-04

## 2023-12-04 PROBLEM — I50.9 CONGESTIVE HEART FAILURE DUE TO CARDIOMYOPATHY (MULTI): Status: RESOLVED | Noted: 2023-04-04 | Resolved: 2023-12-04

## 2023-12-04 PROBLEM — I42.9 CONGESTIVE HEART FAILURE DUE TO CARDIOMYOPATHY (MULTI): Status: RESOLVED | Noted: 2023-04-04 | Resolved: 2023-12-04

## 2023-12-04 PROBLEM — M06.09 RHEUMATOID ARTHRITIS OF MULTIPLE SITES WITH NEGATIVE RHEUMATOID FACTOR (MULTI): Status: RESOLVED | Noted: 2021-10-22 | Resolved: 2023-12-04

## 2023-12-04 PROBLEM — N18.9 CHRONIC KIDNEY DISEASE: Status: RESOLVED | Noted: 2023-02-11 | Resolved: 2023-12-04

## 2023-12-04 PROBLEM — M06.9 RHEUMATOID ARTHRITIS INVOLVING MULTIPLE JOINTS (MULTI): Status: RESOLVED | Noted: 2023-09-06 | Resolved: 2023-12-04

## 2023-12-04 PROBLEM — N18.32 STAGE 3B CHRONIC KIDNEY DISEASE (MULTI): Status: RESOLVED | Noted: 2023-09-06 | Resolved: 2023-12-04

## 2023-12-04 PROBLEM — E78.1 HYPERTRIGLYCERIDEMIA: Status: RESOLVED | Noted: 2023-02-11 | Resolved: 2023-12-04

## 2023-12-04 NOTE — PROGRESS NOTES
"Subjective    Patient ID: Toribio Villatoro is a 58 y.o. male who presents for Hypertension, Hyperlipidemia, Hypothyroidism, Obesity, Chronic Kidney Disease, Congestive Heart Failure, and Diabetes.      Diabetes Mellitus: Reports taking medication as advised and denies side effects. The patient's is checking sugars and reports values to be in expected ranges. Checking blood sugar routinely and denies any hypoglycemic events since last visit.   Hypertension, reports no side effects to prescribed medication. The patient reports good compliance with the medication. The patient is trying to follow a low-salt diet.  Hyperlipidemia: Reports taking medication as advised and without side effects.  The patient is reporting no leg cramping in particular. The patient is trying to follow a low-fat diet.  Hypothyroidism: This patient is here today for review of hypothyroidism. Lab work is reviewed. The patient is taking thyroid replacement hormone without side effects.  This patient has stage IIIA renal insufficiency which has been stable. Patient is encouraged to continue water intake. Patient is encouraged to remain very well hydrated.  Rheumatoid arthritis is present and causing discomfort patient is being treated for that.  The patient denies having the following symptoms: chest pain, chest pressure, fever, chills, N/V/D, constipation, dizziness, headaches, SOB.    Objective   Vitals:  /74   Pulse 74   Temp 36.5 °C (97.7 °F)   Resp 18   Ht 1.778 m (5' 10\")   Wt 123 kg (271 lb 3.2 oz)   SpO2 98%   BMI 38.91 kg/m²     Physical Exam  Vitals reviewed.   Constitutional:       Appearance: Normal appearance. He is obese.   Neck:      Vascular: No carotid bruit.   Cardiovascular:      Rate and Rhythm: Normal rate and regular rhythm.      Pulses: Normal pulses.      Heart sounds: Normal heart sounds.   Pulmonary:      Effort: Pulmonary effort is normal. No respiratory distress.      Breath sounds: Normal breath sounds. No " wheezing.   Abdominal:      General: There is no distension.      Palpations: Abdomen is soft. There is no mass.      Tenderness: There is no abdominal tenderness. There is no right CVA tenderness, left CVA tenderness, guarding or rebound.   Musculoskeletal:      Cervical back: Normal range of motion and neck supple. No rigidity.      Right lower leg: No edema.      Left lower leg: No edema.   Lymphadenopathy:      Cervical: No cervical adenopathy.   Neurological:      Mental Status: He is alert.      Labs reviewed from 11/27/2023: CMP, CBC, Lipid, HgA1C 7.6 % GFR was 44.    Assessment/Plan   Problem List Items Addressed This Visit       HTN (hypertension), benign - Primary     This condition is well controlled, continue with current medications.  Amlodipine 10 mg daily         Relevant Orders    CBC and Auto Differential    Comprehensive Metabolic Panel    Hyperlipemia, mixed     This condition is well controlled, continue with current medications.  Fenofibrate 160 mg daily, atorvastatin 10 mg daily, icosapent 1 g 2 p.o. twice daily.         Relevant Orders    Lipid Panel    Hypogonadism male    Relevant Orders    Testosterone    Rheumatoid arthritis (CMS/HCC)    Type 2 diabetes mellitus without complication, without long-term current use of insulin (CMS/HCC)     This condition is stable, continue with current treatment.  Glyxambi 25-5 mg daily, metformin 500 mg twice daily         Relevant Orders    Follow Up In [facility] - PCP - Established    Hemoglobin A1C    Albumin , Urine Random    Chronic kidney disease due to hypertension     This condition is stable, continue with current treatment.         Acquired hypothyroidism     This condition is stable, continue with current treatment.  Synthroid 75 mcg daily         Relevant Orders    TSH with reflex to Free T4 if abnormal    Chronic systolic (congestive) heart failure (CMS/HCC)     Well compensated, continue current treatment.  Entresto  mg  twice daily and spironolactone 50 mg daily         Morbid obesity with BMI of 40.0-44.9, adult (CMS/[facility])     Patient is encouraged to continue losing weight.          Other Visit Diagnoses       Need for influenza vaccination        Relevant Orders    Flu vaccine, quadrivalent, recombinant, preservative free, adult (FLUBLOK) (Completed)            Follow up in: 4 month(s) or sooner if needed with labs prior.     Scribe Attestation  By signing my name below, I, Virginia Vegas   attest that this documentation has been prepared under the direction and in the presence of Gunner Quiroz MD.

## 2023-12-05 ENCOUNTER — OFFICE VISIT (OUTPATIENT)
Dept: PRIMARY CARE | Facility: CLINIC | Age: 58
End: 2023-12-05
Payer: COMMERCIAL

## 2023-12-05 VITALS
WEIGHT: 271.2 LBS | OXYGEN SATURATION: 98 % | DIASTOLIC BLOOD PRESSURE: 74 MMHG | HEIGHT: 70 IN | RESPIRATION RATE: 18 BRPM | SYSTOLIC BLOOD PRESSURE: 132 MMHG | BODY MASS INDEX: 38.82 KG/M2 | TEMPERATURE: 97.7 F | HEART RATE: 74 BPM

## 2023-12-05 DIAGNOSIS — E66.01 MORBID OBESITY WITH BMI OF 40.0-44.9, ADULT (MULTI): ICD-10-CM

## 2023-12-05 DIAGNOSIS — I10 HTN (HYPERTENSION), BENIGN: Primary | ICD-10-CM

## 2023-12-05 DIAGNOSIS — E11.9 TYPE 2 DIABETES MELLITUS WITHOUT COMPLICATION, WITHOUT LONG-TERM CURRENT USE OF INSULIN (MULTI): ICD-10-CM

## 2023-12-05 DIAGNOSIS — E03.9 ACQUIRED HYPOTHYROIDISM: ICD-10-CM

## 2023-12-05 DIAGNOSIS — M05.79 RHEUMATOID ARTHRITIS INVOLVING MULTIPLE SITES WITH POSITIVE RHEUMATOID FACTOR (MULTI): ICD-10-CM

## 2023-12-05 DIAGNOSIS — Z23 NEED FOR INFLUENZA VACCINATION: ICD-10-CM

## 2023-12-05 DIAGNOSIS — E29.1 HYPOGONADISM MALE: ICD-10-CM

## 2023-12-05 DIAGNOSIS — I12.9 CHRONIC KIDNEY DISEASE DUE TO HYPERTENSION: ICD-10-CM

## 2023-12-05 DIAGNOSIS — E78.2 HYPERLIPEMIA, MIXED: ICD-10-CM

## 2023-12-05 DIAGNOSIS — I50.22 CHRONIC SYSTOLIC (CONGESTIVE) HEART FAILURE (MULTI): ICD-10-CM

## 2023-12-05 PROBLEM — M25.559 GREATER TROCHANTERIC PAIN SYNDROME: Status: ACTIVE | Noted: 2023-11-01

## 2023-12-05 PROCEDURE — 3008F BODY MASS INDEX DOCD: CPT | Performed by: FAMILY MEDICINE

## 2023-12-05 PROCEDURE — 90471 IMMUNIZATION ADMIN: CPT | Performed by: FAMILY MEDICINE

## 2023-12-05 PROCEDURE — 99214 OFFICE O/P EST MOD 30 MIN: CPT | Performed by: FAMILY MEDICINE

## 2023-12-05 PROCEDURE — 1036F TOBACCO NON-USER: CPT | Performed by: FAMILY MEDICINE

## 2023-12-05 PROCEDURE — 3051F HG A1C>EQUAL 7.0%<8.0%: CPT | Performed by: FAMILY MEDICINE

## 2023-12-05 PROCEDURE — 3048F LDL-C <100 MG/DL: CPT | Performed by: FAMILY MEDICINE

## 2023-12-05 PROCEDURE — 90682 RIV4 VACC RECOMBINANT DNA IM: CPT | Performed by: FAMILY MEDICINE

## 2023-12-05 PROCEDURE — 3075F SYST BP GE 130 - 139MM HG: CPT | Performed by: FAMILY MEDICINE

## 2023-12-05 PROCEDURE — 3078F DIAST BP <80 MM HG: CPT | Performed by: FAMILY MEDICINE

## 2023-12-06 NOTE — ASSESSMENT & PLAN NOTE
Well compensated, continue current treatment.  Entresto  mg twice daily and spironolactone 50 mg daily

## 2023-12-06 NOTE — ASSESSMENT & PLAN NOTE
This condition is stable, continue with current treatment.  Glyxambi 25-5 mg daily, metformin 500 mg twice daily

## 2023-12-06 NOTE — ASSESSMENT & PLAN NOTE
This condition is well controlled, continue with current medications.  Fenofibrate 160 mg daily, atorvastatin 10 mg daily, icosapent 1 g 2 p.o. twice daily.

## 2024-01-06 DIAGNOSIS — E29.1 HYPOGONADISM MALE: ICD-10-CM

## 2024-01-07 ENCOUNTER — PATIENT MESSAGE (OUTPATIENT)
Dept: PRIMARY CARE | Facility: CLINIC | Age: 59
End: 2024-01-07
Payer: COMMERCIAL

## 2024-01-07 DIAGNOSIS — F33.9 RECURRENT MAJOR DEPRESSIVE DISORDER, REMISSION STATUS UNSPECIFIED (CMS-HCC): ICD-10-CM

## 2024-01-07 DIAGNOSIS — K21.9 GASTROESOPHAGEAL REFLUX DISEASE WITHOUT ESOPHAGITIS: ICD-10-CM

## 2024-01-08 RX ORDER — PANTOPRAZOLE SODIUM 40 MG/1
40 TABLET, DELAYED RELEASE ORAL
Qty: 90 TABLET | Refills: 3 | Status: SHIPPED | OUTPATIENT
Start: 2024-01-08

## 2024-01-08 RX ORDER — DULOXETIN HYDROCHLORIDE 60 MG/1
60 CAPSULE, DELAYED RELEASE ORAL 2 TIMES DAILY
Qty: 180 CAPSULE | Refills: 3 | Status: SHIPPED | OUTPATIENT
Start: 2024-01-08

## 2024-01-08 RX ORDER — TESTOSTERONE CYPIONATE 200 MG/ML
INJECTION, SOLUTION INTRAMUSCULAR
Qty: 4 ML | Refills: 0 | Status: SHIPPED | OUTPATIENT
Start: 2024-01-08

## 2024-01-08 NOTE — TELEPHONE ENCOUNTER
Recent Visits  Date Type Provider Dept   12/05/23 Office Visit Gunner Quiroz MD Do [id number] [facility]   08/08/23 Office Visit Gunner Quiroz MD Do [id number] [facility]   04/04/23 Office Visit Gunner Quiroz MD Do [id number] [facility]   Showing recent visits within past 540 days and meeting all other requirements  Future Appointments  Date Type Provider Dept   04/02/24 Appointment Gunner Quiroz MD Do [id number] [facility]   Showing future appointments within next 180 days and meeting all other requirements

## 2024-01-12 ENCOUNTER — TELEPHONE (OUTPATIENT)
Dept: CARDIOLOGY | Facility: CLINIC | Age: 59
End: 2024-01-12
Payer: COMMERCIAL

## 2024-01-12 DIAGNOSIS — E78.1 HYPERTRIGLYCERIDEMIA: ICD-10-CM

## 2024-01-12 DIAGNOSIS — E78.2 HYPERLIPEMIA, MIXED: ICD-10-CM

## 2024-01-12 DIAGNOSIS — I50.22 CHRONIC SYSTOLIC (CONGESTIVE) HEART FAILURE (MULTI): Primary | ICD-10-CM

## 2024-01-12 DIAGNOSIS — E11.9 TYPE 2 DIABETES MELLITUS WITHOUT COMPLICATION, WITHOUT LONG-TERM CURRENT USE OF INSULIN (MULTI): ICD-10-CM

## 2024-01-12 RX ORDER — FENOFIBRATE 160 MG/1
160 TABLET ORAL DAILY
Qty: 90 TABLET | Refills: 3 | Status: SHIPPED | OUTPATIENT
Start: 2024-01-12 | End: 2025-01-11

## 2024-01-12 RX ORDER — SACUBITRIL AND VALSARTAN 97; 103 MG/1; MG/1
1 TABLET, FILM COATED ORAL 2 TIMES DAILY
Qty: 180 TABLET | Refills: 3 | Status: SHIPPED | OUTPATIENT
Start: 2024-01-12 | End: 2025-01-11

## 2024-01-12 RX ORDER — ICOSAPENT ETHYL 1 G/1
2 CAPSULE ORAL
Qty: 360 CAPSULE | Refills: 3 | Status: SHIPPED | OUTPATIENT
Start: 2024-01-12 | End: 2025-01-11

## 2024-01-16 ENCOUNTER — TELEPHONE (OUTPATIENT)
Dept: CARDIOLOGY | Facility: CLINIC | Age: 59
End: 2024-01-16
Payer: COMMERCIAL

## 2024-01-18 ENCOUNTER — TELEPHONE (OUTPATIENT)
Dept: PRIMARY CARE | Facility: CLINIC | Age: 59
End: 2024-01-18
Payer: COMMERCIAL

## 2024-01-18 NOTE — LETTER
1/23/24      RE: Toribio Villatoro        [address] [place] [id number]         Member ID: [id number]        Case ID: [id number]    To Claim Appeals Department    This is a request for a coverage appeal of the medication for Toribio Villatoro.    The request is for Synthroid 75 mcg.      Toribio is under my care for the treatment of Acquired hypothyroidism. This patient has tried the generic alternative, Levothyroxine in the past.  These medications did not provide stable results, and patient  experienced side effects. Patient has seen stabilized normal thyroid levels since beginning the Brand Synthroid. I feel it would be detrimental to Toribio's health and sense of well-being to discontinue this medication. However the current coverage for this medication makes it difficult for the patient to consistently get his medication. I am requesting a coverage rule exception urgently.      Please contact me should you require any additional information.    Sincerely,        Gunner Quiroz MD

## 2024-01-18 NOTE — TELEPHONE ENCOUNTER
We received a request for prior authorization on the patient's Synthroid 75 mcg tablet   from their pharmacy. Prior authorization was submitted to insurance today. We will await their determination.

## 2024-01-23 NOTE — TELEPHONE ENCOUNTER
Pa for Brand name was denied. Denial appeal letter was faxed today for the patient. He has tried and failed generic alternative.

## 2024-03-04 ENCOUNTER — TELEPHONE (OUTPATIENT)
Dept: PRIMARY CARE | Facility: CLINIC | Age: 59
End: 2024-03-04

## 2024-03-04 PROBLEM — G47.30 SLEEP APNEA: Status: RESOLVED | Noted: 2020-01-28 | Resolved: 2024-03-04

## 2024-03-04 PROBLEM — I42.8 NICM (NONISCHEMIC CARDIOMYOPATHY) (MULTI): Status: RESOLVED | Noted: 2023-10-11 | Resolved: 2024-03-04

## 2024-03-04 PROBLEM — Z78.9 NEVER SMOKED ANY SUBSTANCE: Status: RESOLVED | Noted: 2023-09-06 | Resolved: 2024-03-04

## 2024-03-04 NOTE — TELEPHONE ENCOUNTER
Prior Authorization has been APPROVED without the need for the second Testosterone test on file. Patient was made aware.

## 2024-03-04 NOTE — TELEPHONE ENCOUNTER
We received a request for prior authorization on the patient's testosterone cypionate (Depo-Testosterone) 200 mg/mL injection  from their pharmacy. Prior authorization was submitted to insurance today. We will await their determination.        The patients last Testosterone test was on 11/30/2022.  A denial may occur due to not having a minimum of two testosterone tests with low numbers. The patient has one on file. A second Testosterone test is needed to ensure approval.   The patient is aware he needs  a second Testosterone test to prevent denial.

## 2024-03-04 NOTE — PROGRESS NOTES
"Subjective    Patient ID: Toribio Villatoro is a 58 y.o. male who presents for Follow-up and testosterone injection.     Hypertension: The patient is here for follow-up of elevated blood pressure. He is adherent to a low salt diet. Blood pressure is well controlled at home. No new myalgias or GI upset on diet control.    Hyperlipidemia: The patient is present today for a follow up of hyperlipidemia. He denies having any leg cramping in particular. He is trying to follow a low-fat diet.     Hypogonadism:  The patient is present for a follow up of hypogonadism.  This patient is due for testosterone injection.  He needs an injection in the office today and we will send prescription for the home supply.    Hypothyroidism: Patient presents for a follow up of their thyroid function. Energy wise that patient is adequately.     YUVAL: The patient is present today for a follow up of YUVAL.     Diabetes Mellitus: The patient presents today for a follow up of DM. Patient denies any side effects to the medications.      Morbid Obesity: The patient is here for follow up of morbid obesity.  The patient is continuously working on diet and exercise. The patient will continue working on strategies to maintain weight loss and stay well hydrated.         The patient denies having the following symptoms: chest pain, chest pressure, fever, chills, N/V/D, constipation, dizziness, headaches, SOB.    Objective   Vitals:  /78   Pulse 92   Temp 36.3 °C (97.4 °F)   Resp 20   Ht 1.778 m (5' 10\")   Wt 126 kg (278 lb 12.8 oz)   SpO2 94%   BMI 40.00 kg/m²     Physical Exam  Vitals reviewed.   Constitutional:       Appearance: Normal appearance. He is obese.   Neck:      Vascular: No carotid bruit.   Cardiovascular:      Rate and Rhythm: Normal rate and regular rhythm.      Pulses: Normal pulses.      Heart sounds: Normal heart sounds.   Pulmonary:      Effort: Pulmonary effort is normal. No respiratory distress.      Breath sounds: Normal " "breath sounds. No wheezing.   Abdominal:      General: There is no distension.      Palpations: Abdomen is soft. There is no mass.      Tenderness: There is no abdominal tenderness. There is no right CVA tenderness, left CVA tenderness, guarding or rebound.   Musculoskeletal:      Cervical back: Normal range of motion and neck supple. No rigidity.      Right lower leg: No edema.      Left lower leg: No edema.   Lymphadenopathy:      Cervical: No cervical adenopathy.   Neurological:      Mental Status: He is alert.        Labs reviewed from :  11/27/2023 CMP, CBC, Lipid, HgA1C 7.6 % WNL.   Testosterone injection 400 mg intramuscularly into the right gluteal region and injected by myself.    Assessment/Plan   Problem List Items Addressed This Visit       HTN (hypertension), benign     This condition is well controlled, continue with current medications.  Amlodipine 10 mg daily          Hyperlipemia, mixed     Is clinically stable so we will continue with current medications and lab work to confirm status ordered.   Fenofibrate 160 mg daily, atorvastatin 10 mg daily, icosapent 1 g 2 p.o. twice daily.          Hypogonadism male - Primary      Is well controlled, continue with current medications.          Relevant Medications    syringe, disposable, (BD Luer-Kate Syringe) 3 mL syringe    syringe with needle (BD Luer-Kate Syringe) 3 mL 18 x 1 1/2\" syringe    testosterone cypionate (Depo-Testosterone) injection 400 mg (Completed)    testosterone cypionate (Depo-Testosterone) 200 mg/mL injection    Obstructive sleep apnea, adult      Is stable, continue with current treatment.          Type 2 diabetes mellitus without complication, without long-term current use of insulin (CMS/[facility])     Is clinically stable so we will continue with current medications and lab work to confirm status ordered.Glyxambi 25-5 mg daily, metformin 500 mg twice daily            Acquired hypothyroidism     Is clinically stable so we will continue with " current medications and lab work to confirm status ordered.  Synthroid 75 mcg daily          Morbid obesity with BMI of 40.0-44.9, adult (CMS/[facility])     The patient is continuously working on diet and exercise. The patient will continue working on strategies to maintain weight loss and stay well hydrated.               Follow up in: 04/02/2024 as scheduled or sooner if needed    Scribe Attestation  By signing my name below, I, Concepcion Sanchez , Scribe   attest that this documentation has been prepared under the direction and in the presence of Gunner Quiroz MD.

## 2024-03-04 NOTE — TELEPHONE ENCOUNTER
SIRISHA CALLED IN, STATED THEY ARE HAVING ISSUES WITH PHARMACY WANTING PRIOR AUTH FOR THE AT-HOME TESTOSTERONE INJ AND NADJA WON'T BE ABLE TO RECEIVE THE MEDICATION UNTIL THE END OF THE MONTH. SIRISHA MADE NADJA AN APPOINTMENT WITH DR. ALARCON FOR 3/6 @12:45 FOR THE TESTOSTERONE INJECTION AND TO DISCUSS PRIOR AUTHORIZATION.

## 2024-03-06 ENCOUNTER — OFFICE VISIT (OUTPATIENT)
Dept: PRIMARY CARE | Facility: CLINIC | Age: 59
End: 2024-03-06
Payer: COMMERCIAL

## 2024-03-06 VITALS
DIASTOLIC BLOOD PRESSURE: 78 MMHG | HEART RATE: 92 BPM | WEIGHT: 278.8 LBS | RESPIRATION RATE: 20 BRPM | SYSTOLIC BLOOD PRESSURE: 116 MMHG | TEMPERATURE: 97.4 F | BODY MASS INDEX: 39.91 KG/M2 | OXYGEN SATURATION: 94 % | HEIGHT: 70 IN

## 2024-03-06 DIAGNOSIS — E29.1 HYPOGONADISM MALE: Primary | ICD-10-CM

## 2024-03-06 DIAGNOSIS — E03.9 ACQUIRED HYPOTHYROIDISM: ICD-10-CM

## 2024-03-06 DIAGNOSIS — E66.01 MORBID OBESITY WITH BMI OF 40.0-44.9, ADULT (MULTI): ICD-10-CM

## 2024-03-06 DIAGNOSIS — E78.2 HYPERLIPEMIA, MIXED: ICD-10-CM

## 2024-03-06 DIAGNOSIS — E11.9 TYPE 2 DIABETES MELLITUS WITHOUT COMPLICATION, WITHOUT LONG-TERM CURRENT USE OF INSULIN (MULTI): ICD-10-CM

## 2024-03-06 DIAGNOSIS — G47.33 OBSTRUCTIVE SLEEP APNEA, ADULT: ICD-10-CM

## 2024-03-06 DIAGNOSIS — I10 HTN (HYPERTENSION), BENIGN: ICD-10-CM

## 2024-03-06 PROBLEM — I12.9 CHRONIC KIDNEY DISEASE DUE TO HYPERTENSION: Status: RESOLVED | Noted: 2023-09-06 | Resolved: 2024-03-06

## 2024-03-06 PROCEDURE — 3078F DIAST BP <80 MM HG: CPT | Performed by: FAMILY MEDICINE

## 2024-03-06 PROCEDURE — 3008F BODY MASS INDEX DOCD: CPT | Performed by: FAMILY MEDICINE

## 2024-03-06 PROCEDURE — 96372 THER/PROPH/DIAG INJ SC/IM: CPT | Performed by: FAMILY MEDICINE

## 2024-03-06 PROCEDURE — 3074F SYST BP LT 130 MM HG: CPT | Performed by: FAMILY MEDICINE

## 2024-03-06 PROCEDURE — 1036F TOBACCO NON-USER: CPT | Performed by: FAMILY MEDICINE

## 2024-03-06 PROCEDURE — 99213 OFFICE O/P EST LOW 20 MIN: CPT | Performed by: FAMILY MEDICINE

## 2024-03-06 RX ORDER — TESTOSTERONE CYPIONATE 200 MG/ML
INJECTION, SOLUTION INTRAMUSCULAR
Qty: 10 ML | Refills: 1 | Status: SHIPPED | OUTPATIENT
Start: 2024-03-06

## 2024-03-06 RX ORDER — TESTOSTERONE CYPIONATE 200 MG/ML
400 INJECTION, SOLUTION INTRAMUSCULAR
Qty: 5 ML | Refills: 5 | Status: CANCELLED | OUTPATIENT
Start: 2024-03-06

## 2024-03-06 RX ORDER — TESTOSTERONE CYPIONATE 200 MG/ML
INJECTION, SOLUTION INTRAMUSCULAR
Qty: 4 ML | Refills: 3 | Status: CANCELLED | OUTPATIENT
Start: 2024-03-06

## 2024-03-06 RX ORDER — SYRINGE, DISPOSABLE, 3 ML
1 SYRINGE, EMPTY DISPOSABLE MISCELLANEOUS
Qty: 10 EACH | Refills: 5 | Status: SHIPPED | OUTPATIENT
Start: 2024-03-06

## 2024-03-06 RX ORDER — TESTOSTERONE CYPIONATE 200 MG/ML
400 INJECTION, SOLUTION INTRAMUSCULAR ONCE
Status: COMPLETED | OUTPATIENT
Start: 2024-03-06 | End: 2024-03-06

## 2024-03-06 RX ORDER — SYRINGE WITH NEEDLE, 1 ML 25GX5/8"
3 SYRINGE, EMPTY DISPOSABLE MISCELLANEOUS
Qty: 10 EACH | Refills: 5 | Status: SHIPPED | OUTPATIENT
Start: 2024-03-06

## 2024-03-06 RX ADMIN — TESTOSTERONE CYPIONATE 400 MG: 200 INJECTION, SOLUTION INTRAMUSCULAR at 15:59

## 2024-03-06 ASSESSMENT — ENCOUNTER SYMPTOMS
LOSS OF SENSATION IN FEET: 1
OCCASIONAL FEELINGS OF UNSTEADINESS: 0
DEPRESSION: 0

## 2024-03-06 ASSESSMENT — PATIENT HEALTH QUESTIONNAIRE - PHQ9
SUM OF ALL RESPONSES TO PHQ9 QUESTIONS 1 AND 2: 0
2. FEELING DOWN, DEPRESSED OR HOPELESS: NOT AT ALL
1. LITTLE INTEREST OR PLEASURE IN DOING THINGS: NOT AT ALL

## 2024-03-06 NOTE — ASSESSMENT & PLAN NOTE
Is clinically stable so we will continue with current medications and lab work to confirm status ordered.   Fenofibrate 160 mg daily, atorvastatin 10 mg daily, icosapent 1 g 2 p.o. twice daily.

## 2024-03-06 NOTE — ASSESSMENT & PLAN NOTE
Is clinically stable so we will continue with current medications and lab work to confirm status ordered.Glyxambi 25-5 mg daily, metformin 500 mg twice daily

## 2024-03-06 NOTE — ASSESSMENT & PLAN NOTE
Is clinically stable so we will continue with current medications and lab work to confirm status ordered.  Synthroid 75 mcg daily

## 2024-03-10 DIAGNOSIS — E78.2 HYPERLIPEMIA, MIXED: ICD-10-CM

## 2024-03-11 RX ORDER — ATORVASTATIN CALCIUM 10 MG/1
10 TABLET, FILM COATED ORAL DAILY
Qty: 90 TABLET | Refills: 1 | Status: SHIPPED | OUTPATIENT
Start: 2024-03-11 | End: 2024-04-02 | Stop reason: SDUPTHER

## 2024-03-11 NOTE — TELEPHONE ENCOUNTER
Date of last appointment:  3/6/2024   Date of next appointment:  4/2/2024   Best number to reach patient:  [phone]

## 2024-03-26 DIAGNOSIS — E03.9 ACQUIRED HYPOTHYROIDISM: ICD-10-CM

## 2024-03-26 RX ORDER — LEVOTHYROXINE SODIUM 75 UG/1
75 TABLET ORAL DAILY
Qty: 90 TABLET | Refills: 0 | Status: SHIPPED | OUTPATIENT
Start: 2024-03-26 | End: 2024-04-02 | Stop reason: SDUPTHER

## 2024-03-26 NOTE — TELEPHONE ENCOUNTER
Recent Visits  Date Type Provider Dept   03/06/24 Office Visit Gunner Quiroz MD Do [id number] [facility]   12/05/23 Office Visit Gunner Quiroz MD Do [id number] [facility]   08/08/23 Office Visit Gunner Quiroz MD Do [id number] [facility]   04/04/23 Office Visit Gunner Quiroz MD Do [id number] [facility]   Showing recent visits within past 540 days and meeting all other requirements  Future Appointments  Date Type Provider Dept   04/02/24 Appointment Gunner Quiroz MD Do [id number] [facility]   Showing future appointments within next 180 days and meeting all other requirements

## 2024-03-27 ENCOUNTER — LAB (OUTPATIENT)
Dept: LAB | Facility: LAB | Age: 59
End: 2024-03-27
Payer: COMMERCIAL

## 2024-03-27 DIAGNOSIS — E55.9 VITAMIN D DEFICIENCY, UNSPECIFIED: ICD-10-CM

## 2024-03-27 DIAGNOSIS — E11.21 TYPE 2 DIABETES MELLITUS WITH DIABETIC NEPHROPATHY (MULTI): ICD-10-CM

## 2024-03-27 DIAGNOSIS — N18.32 CHRONIC KIDNEY DISEASE, STAGE 3B (MULTI): Primary | ICD-10-CM

## 2024-03-27 LAB
25(OH)D3 SERPL-MCNC: 47 NG/ML (ref 30–100)
ALBUMIN SERPL BCP-MCNC: 4.7 G/DL (ref 3.4–5)
ALP SERPL-CCNC: 22 U/L (ref 33–136)
ALT SERPL W P-5'-P-CCNC: 21 U/L (ref 10–52)
ANION GAP SERPL CALC-SCNC: 14 MMOL/L (ref 10–20)
AST SERPL W P-5'-P-CCNC: 20 U/L (ref 9–39)
BASOPHILS # BLD AUTO: 0.01 X10*3/UL (ref 0–0.1)
BASOPHILS NFR BLD AUTO: 0.2 %
BILIRUB SERPL-MCNC: 0.7 MG/DL (ref 0–1.2)
BUN SERPL-MCNC: 29 MG/DL (ref 6–23)
CALCIUM SERPL-MCNC: 9.8 MG/DL (ref 8.6–10.3)
CHLORIDE SERPL-SCNC: 103 MMOL/L (ref 98–107)
CHOLEST SERPL-MCNC: 151 MG/DL (ref 0–199)
CHOLESTEROL/HDL RATIO: 3.5
CO2 SERPL-SCNC: 24 MMOL/L (ref 21–32)
CREAT SERPL-MCNC: 1.57 MG/DL (ref 0.5–1.3)
CREAT UR-MCNC: 56 MG/DL (ref 20–370)
EGFRCR SERPLBLD CKD-EPI 2021: 50 ML/MIN/1.73M*2
EOSINOPHIL # BLD AUTO: 0.03 X10*3/UL (ref 0–0.7)
EOSINOPHIL NFR BLD AUTO: 0.7 %
ERYTHROCYTE [DISTWIDTH] IN BLOOD BY AUTOMATED COUNT: 13.1 % (ref 11.5–14.5)
EST. AVERAGE GLUCOSE BLD GHB EST-MCNC: 157 MG/DL
GLUCOSE SERPL-MCNC: 92 MG/DL (ref 74–99)
HBA1C MFR BLD: 7.1 %
HCT VFR BLD AUTO: 42.8 % (ref 41–52)
HDLC SERPL-MCNC: 42.8 MG/DL
HGB BLD-MCNC: 14.3 G/DL (ref 13.5–17.5)
IMM GRANULOCYTES # BLD AUTO: 0.04 X10*3/UL (ref 0–0.7)
IMM GRANULOCYTES NFR BLD AUTO: 1 % (ref 0–0.9)
LDLC SERPL CALC-MCNC: 76 MG/DL
LYMPHOCYTES # BLD AUTO: 1.43 X10*3/UL (ref 1.2–4.8)
LYMPHOCYTES NFR BLD AUTO: 34 %
MCH RBC QN AUTO: 33.3 PG (ref 26–34)
MCHC RBC AUTO-ENTMCNC: 33.4 G/DL (ref 32–36)
MCV RBC AUTO: 100 FL (ref 80–100)
MICROALBUMIN UR-MCNC: 8.9 MG/L
MICROALBUMIN/CREAT UR: 15.9 UG/MG CREAT
MONOCYTES # BLD AUTO: 0.62 X10*3/UL (ref 0.1–1)
MONOCYTES NFR BLD AUTO: 14.8 %
NEUTROPHILS # BLD AUTO: 2.07 X10*3/UL (ref 1.2–7.7)
NEUTROPHILS NFR BLD AUTO: 49.3 %
NON HDL CHOLESTEROL: 108 MG/DL (ref 0–149)
NRBC BLD-RTO: 0 /100 WBCS (ref 0–0)
PHOSPHATE SERPL-MCNC: 3.7 MG/DL (ref 2.5–4.9)
PLATELET # BLD AUTO: 262 X10*3/UL (ref 150–450)
POTASSIUM SERPL-SCNC: 4.6 MMOL/L (ref 3.5–5.3)
PROT SERPL-MCNC: 6.9 G/DL (ref 6.4–8.2)
PTH-INTACT SERPL-MCNC: 15.4 PG/ML (ref 18.5–88)
RBC # BLD AUTO: 4.3 X10*6/UL (ref 4.5–5.9)
SODIUM SERPL-SCNC: 136 MMOL/L (ref 136–145)
TESTOST SERPL-MCNC: 287 NG/DL (ref 240–1000)
TRIGL SERPL-MCNC: 163 MG/DL (ref 0–149)
TSH SERPL-ACNC: 0.45 MIU/L (ref 0.44–3.98)
VLDL: 33 MG/DL (ref 0–40)
WBC # BLD AUTO: 4.2 X10*3/UL (ref 4.4–11.3)

## 2024-03-27 PROCEDURE — 83970 ASSAY OF PARATHORMONE: CPT

## 2024-03-27 PROCEDURE — 83036 HEMOGLOBIN GLYCOSYLATED A1C: CPT

## 2024-03-27 PROCEDURE — 80053 COMPREHEN METABOLIC PANEL: CPT

## 2024-03-27 PROCEDURE — 82043 UR ALBUMIN QUANTITATIVE: CPT

## 2024-03-27 PROCEDURE — 84100 ASSAY OF PHOSPHORUS: CPT

## 2024-03-27 PROCEDURE — 80061 LIPID PANEL: CPT

## 2024-03-27 PROCEDURE — 82306 VITAMIN D 25 HYDROXY: CPT

## 2024-03-27 PROCEDURE — 82570 ASSAY OF URINE CREATININE: CPT

## 2024-03-27 PROCEDURE — 85025 COMPLETE CBC W/AUTO DIFF WBC: CPT

## 2024-03-27 PROCEDURE — 84403 ASSAY OF TOTAL TESTOSTERONE: CPT

## 2024-03-27 PROCEDURE — 36415 COLL VENOUS BLD VENIPUNCTURE: CPT

## 2024-03-27 PROCEDURE — 84443 ASSAY THYROID STIM HORMONE: CPT

## 2024-03-30 DIAGNOSIS — I10 HTN (HYPERTENSION), BENIGN: ICD-10-CM

## 2024-03-31 DIAGNOSIS — I10 HTN (HYPERTENSION), BENIGN: ICD-10-CM

## 2024-04-02 ENCOUNTER — OFFICE VISIT (OUTPATIENT)
Dept: PRIMARY CARE | Facility: CLINIC | Age: 59
End: 2024-04-02
Payer: COMMERCIAL

## 2024-04-02 VITALS
DIASTOLIC BLOOD PRESSURE: 60 MMHG | SYSTOLIC BLOOD PRESSURE: 122 MMHG | HEIGHT: 70 IN | BODY MASS INDEX: 38.8 KG/M2 | HEART RATE: 96 BPM | RESPIRATION RATE: 16 BRPM | OXYGEN SATURATION: 97 % | WEIGHT: 271 LBS

## 2024-04-02 DIAGNOSIS — E29.1 HYPOGONADISM MALE: Primary | ICD-10-CM

## 2024-04-02 DIAGNOSIS — E78.2 HYPERLIPEMIA, MIXED: ICD-10-CM

## 2024-04-02 DIAGNOSIS — E11.9 TYPE 2 DIABETES MELLITUS WITHOUT COMPLICATION, WITHOUT LONG-TERM CURRENT USE OF INSULIN (MULTI): ICD-10-CM

## 2024-04-02 DIAGNOSIS — K21.9 GASTROESOPHAGEAL REFLUX DISEASE WITHOUT ESOPHAGITIS: ICD-10-CM

## 2024-04-02 DIAGNOSIS — E03.9 ACQUIRED HYPOTHYROIDISM: ICD-10-CM

## 2024-04-02 PROCEDURE — 3008F BODY MASS INDEX DOCD: CPT | Performed by: FAMILY MEDICINE

## 2024-04-02 PROCEDURE — 99214 OFFICE O/P EST MOD 30 MIN: CPT | Performed by: FAMILY MEDICINE

## 2024-04-02 PROCEDURE — 3051F HG A1C>EQUAL 7.0%<8.0%: CPT | Performed by: FAMILY MEDICINE

## 2024-04-02 PROCEDURE — 3061F NEG MICROALBUMINURIA REV: CPT | Performed by: FAMILY MEDICINE

## 2024-04-02 PROCEDURE — 3048F LDL-C <100 MG/DL: CPT | Performed by: FAMILY MEDICINE

## 2024-04-02 PROCEDURE — 3078F DIAST BP <80 MM HG: CPT | Performed by: FAMILY MEDICINE

## 2024-04-02 PROCEDURE — 3074F SYST BP LT 130 MM HG: CPT | Performed by: FAMILY MEDICINE

## 2024-04-02 RX ORDER — ATORVASTATIN CALCIUM 10 MG/1
10 TABLET, FILM COATED ORAL DAILY
Qty: 90 TABLET | Refills: 3 | Status: SHIPPED | OUTPATIENT
Start: 2024-04-02

## 2024-04-02 RX ORDER — LEVOTHYROXINE SODIUM 75 UG/1
75 TABLET ORAL DAILY
Qty: 90 TABLET | Refills: 3 | Status: SHIPPED | OUTPATIENT
Start: 2024-04-02

## 2024-04-02 RX ORDER — EMPAGLIFLOZIN AND LINAGLIPTIN 25; 5 MG/1; MG/1
1 TABLET, FILM COATED ORAL DAILY
Qty: 90 TABLET | Refills: 3 | Status: SHIPPED | OUTPATIENT
Start: 2024-04-02

## 2024-04-02 RX ORDER — SPIRONOLACTONE 50 MG/1
50 TABLET, FILM COATED ORAL DAILY
Qty: 90 TABLET | Refills: 3 | Status: SHIPPED | OUTPATIENT
Start: 2024-04-02 | End: 2025-04-02

## 2024-04-02 RX ORDER — AMLODIPINE BESYLATE 10 MG/1
10 TABLET ORAL DAILY
Qty: 90 TABLET | Refills: 3 | Status: SHIPPED | OUTPATIENT
Start: 2024-04-02 | End: 2025-04-02

## 2024-04-02 RX ORDER — GLYCOPYRROLATE 1 MG/1
2 TABLET ORAL 2 TIMES DAILY
Qty: 360 TABLET | Refills: 3 | Status: SHIPPED | OUTPATIENT
Start: 2024-04-02

## 2024-04-02 ASSESSMENT — PATIENT HEALTH QUESTIONNAIRE - PHQ9
2. FEELING DOWN, DEPRESSED OR HOPELESS: NOT AT ALL
1. LITTLE INTEREST OR PLEASURE IN DOING THINGS: NOT AT ALL
SUM OF ALL RESPONSES TO PHQ9 QUESTIONS 1 AND 2: 0

## 2024-04-02 NOTE — TELEPHONE ENCOUNTER
Received request for prescription refills for patient.   Patient follows with Dr. Modi, DO     Last OV 10/11/2023  Next OV 10/9/2024    Pended for signing and sent to provider

## 2024-04-02 NOTE — PROGRESS NOTES
"Subjective   Patient ID:  Toribio Villatoro is a 58 y.o. male patient who presents today for Diabetes (Ac1 has been high ), Hypertension, Hyperlipidemia, Depression, Anxiety, Congestive Heart Failure, and Fatigue (Slept for 16 hours twice )    Patient is doing well today. He reports no side effects with the medications.  Diabetes: His HgA1C has decreased and he attributes it to diet, Glyxambi, Fenofibrate and Vascepa should really help bring the triglycerides down..  Fatigue: Notes two recurring episodes of being unable to stay awake. He was asleep for about 16 hours. The first episode he had diarrhea. Denies heart palpitations. He has a fit bit and notes his resting heart rate is 83-89 bpm.  It gets up to 102-103 bpm when working. Reports getting winded, and having pain and fatigue from said pain.    The patient denies having the following symptoms: chest pain, chest pressure, fever, chills, N/V/D, constipation, dizziness, headaches, SOB.    Patient's Hypothyroidism has been stable. We will have the patient continue the Brand name Synthroid 75 mcg as the generic was ineffective in the past.     Objective   Vitals:  /60 (BP Location: Right arm, BP Cuff Size: Large adult)   Pulse 96   Resp 16   Ht 1.778 m (5' 10\")   Wt 123 kg (271 lb)   SpO2 97%   BMI 38.88 kg/m²     Physical Exam  Vitals reviewed.   Constitutional:       Appearance: Normal appearance.   Neck:      Vascular: No carotid bruit.   Cardiovascular:      Rate and Rhythm: Normal rate and regular rhythm.      Pulses: Normal pulses.      Heart sounds: Normal heart sounds.   Pulmonary:      Effort: Pulmonary effort is normal. No respiratory distress.      Breath sounds: Normal breath sounds. No wheezing.   Abdominal:      General: There is no distension.      Palpations: Abdomen is soft. There is no mass.      Tenderness: There is no abdominal tenderness. There is no right CVA tenderness, left CVA tenderness, guarding or rebound.   Musculoskeletal: "      Cervical back: Normal range of motion and neck supple. No rigidity.      Right lower leg: No edema.      Left lower leg: No edema.   Lymphadenopathy:      Cervical: No cervical adenopathy.   Neurological:      Mental Status: He is alert.           Labs reviewed from :       3/27/24       CMP, CBC, Lipid, HgA1C 7.1 %., TSH, Testosterone        Assessment/Plan   Problem List Items Addressed This Visit       GERD (gastroesophageal reflux disease)    Current Assessment & Plan     This condition is stable, continue with current treatment.         Relevant Medications    glycopyrrolate (Robinul) 1 mg tablet    Hyperlipemia, mixed    Current Assessment & Plan     Condition improving, patient will continue with current medicines which include Vascepa, fenofibrate and atorvastatin         Relevant Medications    atorvastatin (Lipitor) 10 mg tablet    Other Relevant Orders    Lipid Panel    Comprehensive Metabolic Panel    Hypogonadism male - Primary    Current Assessment & Plan     This condition is stable, continue with current treatment.         Relevant Orders    Testosterone    Type 2 diabetes mellitus without complication, without long-term current use of insulin (CMS/[facility])    Current Assessment & Plan     This condition is well controlled, continue with current medications.         Relevant Medications    empagliflozin-linagliptin (Glyxambi) 25-5 mg    Other Relevant Orders    Hemoglobin A1C    CBC and Auto Differential    Acquired hypothyroidism    Current Assessment & Plan     This condition is stable, continue with current treatment.         Relevant Medications    Synthroid 75 mcg tablet    Other Relevant Orders    TSH with reflex to Free T4 if abnormal     Refilled Synthroid, atorvastatin, empagliflozin-lingaptin, glycopyrrolate.  Advised to observe for other fatigue episodes.      Follow up in: 4 months or sooner if needed with labs prior.      Scribe Attestation  By signing my name below, IGunner,  MD, Scribe   attest that this documentation has been prepared under the direction and in the presence of Gunner Quiroz MD.

## 2024-04-08 NOTE — ASSESSMENT & PLAN NOTE
Condition improving, patient will continue with current medicines which include Vascepa, fenofibrate and atorvastatin

## 2024-06-01 DIAGNOSIS — I10 HTN (HYPERTENSION), BENIGN: ICD-10-CM

## 2024-06-01 DIAGNOSIS — I51.7 LVH (LEFT VENTRICULAR HYPERTROPHY): ICD-10-CM

## 2024-06-04 RX ORDER — CARVEDILOL 25 MG/1
37.5 TABLET ORAL
Qty: 270 TABLET | Refills: 1 | Status: SHIPPED | OUTPATIENT
Start: 2024-06-04

## 2024-06-04 NOTE — TELEPHONE ENCOUNTER
Received request for prescription refills for patient.   Patient follows with Jordan Modi D.O.     Request is for carvedilol 25 mg 1 1/2 tabs BID  Is patient currently on medication yes    Last OV 10/11/23  Next OV 10/9/24    Pended for signing and sent to provider

## 2024-06-30 DIAGNOSIS — N32.81 OVERACTIVE BLADDER: ICD-10-CM

## 2024-07-02 RX ORDER — OXYBUTYNIN CHLORIDE 10 MG/1
10 TABLET, EXTENDED RELEASE ORAL DAILY
Qty: 90 TABLET | Refills: 3 | Status: SHIPPED | OUTPATIENT
Start: 2024-07-02

## 2024-07-31 ENCOUNTER — LAB (OUTPATIENT)
Dept: LAB | Facility: LAB | Age: 59
End: 2024-07-31
Payer: COMMERCIAL

## 2024-07-31 DIAGNOSIS — E29.1 HYPOGONADISM MALE: ICD-10-CM

## 2024-07-31 DIAGNOSIS — E11.9 TYPE 2 DIABETES MELLITUS WITHOUT COMPLICATION, WITHOUT LONG-TERM CURRENT USE OF INSULIN (MULTI): ICD-10-CM

## 2024-07-31 DIAGNOSIS — E78.2 HYPERLIPEMIA, MIXED: ICD-10-CM

## 2024-07-31 DIAGNOSIS — E03.9 ACQUIRED HYPOTHYROIDISM: ICD-10-CM

## 2024-07-31 LAB
ALBUMIN SERPL BCP-MCNC: 4.7 G/DL (ref 3.4–5)
ALP SERPL-CCNC: 22 U/L (ref 33–136)
ALT SERPL W P-5'-P-CCNC: 26 U/L (ref 10–52)
ANION GAP SERPL CALC-SCNC: 15 MMOL/L (ref 10–20)
AST SERPL W P-5'-P-CCNC: 28 U/L (ref 9–39)
BASOPHILS # BLD AUTO: 0.01 X10*3/UL (ref 0–0.1)
BASOPHILS NFR BLD AUTO: 0.2 %
BILIRUB SERPL-MCNC: 0.7 MG/DL (ref 0–1.2)
BUN SERPL-MCNC: 28 MG/DL (ref 6–23)
CALCIUM SERPL-MCNC: 9.6 MG/DL (ref 8.6–10.3)
CHLORIDE SERPL-SCNC: 102 MMOL/L (ref 98–107)
CHOLEST SERPL-MCNC: 159 MG/DL (ref 0–199)
CHOLESTEROL/HDL RATIO: 3.9
CO2 SERPL-SCNC: 24 MMOL/L (ref 21–32)
CREAT SERPL-MCNC: 2.01 MG/DL (ref 0.5–1.3)
EGFRCR SERPLBLD CKD-EPI 2021: 37 ML/MIN/1.73M*2
EOSINOPHIL # BLD AUTO: 0.01 X10*3/UL (ref 0–0.7)
EOSINOPHIL NFR BLD AUTO: 0.2 %
ERYTHROCYTE [DISTWIDTH] IN BLOOD BY AUTOMATED COUNT: 13.8 % (ref 11.5–14.5)
GLUCOSE SERPL-MCNC: 90 MG/DL (ref 74–99)
HCT VFR BLD AUTO: 44 % (ref 41–52)
HDLC SERPL-MCNC: 40.8 MG/DL
HGB BLD-MCNC: 14.2 G/DL (ref 13.5–17.5)
IMM GRANULOCYTES # BLD AUTO: 0.06 X10*3/UL (ref 0–0.7)
IMM GRANULOCYTES NFR BLD AUTO: 1.4 % (ref 0–0.9)
LDLC SERPL CALC-MCNC: 74 MG/DL
LYMPHOCYTES # BLD AUTO: 1.3 X10*3/UL (ref 1.2–4.8)
LYMPHOCYTES NFR BLD AUTO: 29.5 %
MCH RBC QN AUTO: 32 PG (ref 26–34)
MCHC RBC AUTO-ENTMCNC: 32.3 G/DL (ref 32–36)
MCV RBC AUTO: 99 FL (ref 80–100)
MONOCYTES # BLD AUTO: 0.55 X10*3/UL (ref 0.1–1)
MONOCYTES NFR BLD AUTO: 12.5 %
NEUTROPHILS # BLD AUTO: 2.47 X10*3/UL (ref 1.2–7.7)
NEUTROPHILS NFR BLD AUTO: 56.2 %
NON HDL CHOLESTEROL: 118 MG/DL (ref 0–149)
NRBC BLD-RTO: 0 /100 WBCS (ref 0–0)
PLATELET # BLD AUTO: 262 X10*3/UL (ref 150–450)
POTASSIUM SERPL-SCNC: 4.9 MMOL/L (ref 3.5–5.3)
PROT SERPL-MCNC: 7 G/DL (ref 6.4–8.2)
RBC # BLD AUTO: 4.44 X10*6/UL (ref 4.5–5.9)
SODIUM SERPL-SCNC: 136 MMOL/L (ref 136–145)
T4 FREE SERPL-MCNC: 0.74 NG/DL (ref 0.61–1.12)
TRIGL SERPL-MCNC: 223 MG/DL (ref 0–149)
TSH SERPL-ACNC: 0.43 MIU/L (ref 0.44–3.98)
VLDL: 45 MG/DL (ref 0–40)
WBC # BLD AUTO: 4.4 X10*3/UL (ref 4.4–11.3)

## 2024-07-31 PROCEDURE — 84443 ASSAY THYROID STIM HORMONE: CPT

## 2024-07-31 PROCEDURE — 83036 HEMOGLOBIN GLYCOSYLATED A1C: CPT

## 2024-07-31 PROCEDURE — 84439 ASSAY OF FREE THYROXINE: CPT

## 2024-07-31 PROCEDURE — 80061 LIPID PANEL: CPT

## 2024-07-31 PROCEDURE — 84403 ASSAY OF TOTAL TESTOSTERONE: CPT

## 2024-07-31 PROCEDURE — 36415 COLL VENOUS BLD VENIPUNCTURE: CPT

## 2024-07-31 PROCEDURE — 80053 COMPREHEN METABOLIC PANEL: CPT

## 2024-07-31 PROCEDURE — 85025 COMPLETE CBC W/AUTO DIFF WBC: CPT

## 2024-08-01 LAB
EST. AVERAGE GLUCOSE BLD GHB EST-MCNC: 146 MG/DL
HBA1C MFR BLD: 6.7 %
TESTOST SERPL-MCNC: 340 NG/DL (ref 240–1000)

## 2024-08-06 ENCOUNTER — APPOINTMENT (OUTPATIENT)
Dept: PRIMARY CARE | Facility: CLINIC | Age: 59
End: 2024-08-06
Payer: COMMERCIAL

## 2024-08-06 VITALS
HEART RATE: 66 BPM | OXYGEN SATURATION: 98 % | SYSTOLIC BLOOD PRESSURE: 120 MMHG | BODY MASS INDEX: 38.77 KG/M2 | TEMPERATURE: 97.8 F | WEIGHT: 270.8 LBS | DIASTOLIC BLOOD PRESSURE: 62 MMHG | HEIGHT: 70 IN | RESPIRATION RATE: 16 BRPM

## 2024-08-06 DIAGNOSIS — M05.79 RHEUMATOID ARTHRITIS INVOLVING MULTIPLE SITES WITH POSITIVE RHEUMATOID FACTOR (MULTI): ICD-10-CM

## 2024-08-06 DIAGNOSIS — I10 HTN (HYPERTENSION), BENIGN: ICD-10-CM

## 2024-08-06 DIAGNOSIS — E78.2 HYPERLIPEMIA, MIXED: Primary | ICD-10-CM

## 2024-08-06 DIAGNOSIS — E29.1 HYPOGONADISM MALE: ICD-10-CM

## 2024-08-06 DIAGNOSIS — E11.9 TYPE 2 DIABETES MELLITUS WITHOUT COMPLICATION, WITHOUT LONG-TERM CURRENT USE OF INSULIN (MULTI): ICD-10-CM

## 2024-08-06 DIAGNOSIS — F33.41 RECURRENT MAJOR DEPRESSIVE DISORDER, IN PARTIAL REMISSION (CMS-HCC): ICD-10-CM

## 2024-08-06 PROCEDURE — 3074F SYST BP LT 130 MM HG: CPT | Performed by: FAMILY MEDICINE

## 2024-08-06 PROCEDURE — 3048F LDL-C <100 MG/DL: CPT | Performed by: FAMILY MEDICINE

## 2024-08-06 PROCEDURE — 99214 OFFICE O/P EST MOD 30 MIN: CPT | Performed by: FAMILY MEDICINE

## 2024-08-06 PROCEDURE — 3008F BODY MASS INDEX DOCD: CPT | Performed by: FAMILY MEDICINE

## 2024-08-06 PROCEDURE — 3044F HG A1C LEVEL LT 7.0%: CPT | Performed by: FAMILY MEDICINE

## 2024-08-06 PROCEDURE — 96372 THER/PROPH/DIAG INJ SC/IM: CPT | Performed by: FAMILY MEDICINE

## 2024-08-06 PROCEDURE — 3061F NEG MICROALBUMINURIA REV: CPT | Performed by: FAMILY MEDICINE

## 2024-08-06 PROCEDURE — 1036F TOBACCO NON-USER: CPT | Performed by: FAMILY MEDICINE

## 2024-08-06 PROCEDURE — 3078F DIAST BP <80 MM HG: CPT | Performed by: FAMILY MEDICINE

## 2024-08-06 RX ORDER — METHYLPREDNISOLONE ACETATE 80 MG/ML
80 INJECTION, SUSPENSION INTRA-ARTICULAR; INTRALESIONAL; INTRAMUSCULAR; SOFT TISSUE ONCE
Status: COMPLETED | OUTPATIENT
Start: 2024-08-06 | End: 2024-08-06

## 2024-08-06 NOTE — PROGRESS NOTES
"Subjective   Patient ID: Toribio Villatoro is a 59 y.o. male who presents for Depression, Diabetes, Hypertension, Hyperlipidemia, Obesity, and Hypogonadism.    Depression: Patient is presenting today for a follow up of depression. He voices that they are doing well. He is taking duloxetine 60 mg twice daily.    Diabetes Mellitus: The patient presents today for a follow up of DM. Patient denies any side effects to the medications. He is taking metformin 500 mg, Glyxambi daily.    Hypertension: The patient is here for follow-up of elevated blood pressure. He is adherent to a low salt diet. Blood pressure is well controlled at home. No new myalgias or GI upset on diet control. He is taking Entresto 1 tablet twice daily,     Hyperlipidemia: The patient is present today for a follow up of hyperlipidemia. He denies having any leg cramping in particular. He is trying to follow a low-fat diet. He is taking atorvastatin 10 mg daily, fenofibrate 160 mg daily, Vascepa 1 gram twice daily.    Hypogonadism: he is taking testosterone    Mucus Secretion: He says that he has noticed more phlegm in his chest in the morning. He coughs it out and is better throughout the day but the symptom remembers the next morning. He denies post nasal drip.     YUVAL: he does not use his CPAP regularly.      Morbid Obesity: The patient is here for follow up of morbid obesity.  The patient is continuously working on diet and exercise. The patient will continue working on strategies to maintain weight loss and stay well hydrated. He says that his weight fluctuates between 265 and 275 pounds.     Objective   /62   Pulse 66   Temp 36.6 °C (97.8 °F)   Resp 16   Ht 1.778 m (5' 10\")   Wt 123 kg (270 lb 12.8 oz)   SpO2 98%   BMI 38.86 kg/m²     Physical Exam  Vitals reviewed.   Constitutional:       Appearance: Normal appearance. He is obese.   Neck:      Vascular: No carotid bruit.   Cardiovascular:      Rate and Rhythm: Normal rate and regular " rhythm.      Pulses: Normal pulses.      Heart sounds: Normal heart sounds.   Pulmonary:      Effort: Pulmonary effort is normal. No respiratory distress.      Breath sounds: Normal breath sounds. No wheezing.   Abdominal:      General: There is no distension.      Palpations: Abdomen is soft. There is no mass.      Tenderness: There is no abdominal tenderness. There is no right CVA tenderness, left CVA tenderness, guarding or rebound.   Musculoskeletal:      Cervical back: Normal range of motion and neck supple. No rigidity.      Right lower leg: No edema.      Left lower leg: No edema.   Lymphadenopathy:      Cervical: No cervical adenopathy.   Neurological:      Mental Status: He is alert.         Labs reviewed from : 8-1-2024             CMP, CBC, Lipid, HgA1C 6.7 %       Assessment/Plan   Problem List Items Addressed This Visit       Depression      Is stable, continue with current treatment.           HTN (hypertension), benign      Is well controlled, continue with current medications.           Relevant Orders    CBC and Auto Differential    Comprehensive Metabolic Panel    Hyperlipemia, mixed - Primary      Is well controlled, continue with current medications.           Relevant Orders    Lipid Panel    Hypogonadism male      Is stable, continue with current treatment.           Rheumatoid arthritis (Multi)     This condition is present and symptomatic, will need treatment.  Will give methylprednisolone 80 mg intramuscularly.         Relevant Medications    methylPREDNISolone acetate (DEPO-Medrol) injection 80 mg (Completed)    Type 2 diabetes mellitus without complication, without long-term current use of insulin (Multi)      Is well controlled, continue with current medications.           Relevant Orders    Hemoglobin A1C    Follow Up In [facility] - PCP - Established           Follow up in: 4 months or sooner if needed with labs prior.    Scribe Attestation  By signing my name below, Leanna PRESCOTT  Virginia Rodriguez   attest that this documentation has been prepared under the direction and in the presence of Gunner Quiroz MD.

## 2024-08-07 NOTE — ASSESSMENT & PLAN NOTE
This condition is present and symptomatic, will need treatment.  Will give methylprednisolone 80 mg intramuscularly.

## 2024-09-02 DIAGNOSIS — E11.9 TYPE 2 DIABETES MELLITUS WITHOUT COMPLICATION, WITHOUT LONG-TERM CURRENT USE OF INSULIN (MULTI): ICD-10-CM

## 2024-09-03 RX ORDER — METFORMIN HYDROCHLORIDE 500 MG/1
TABLET ORAL
Qty: 180 TABLET | Refills: 3 | Status: SHIPPED | OUTPATIENT
Start: 2024-09-03

## 2024-09-15 ENCOUNTER — PATIENT MESSAGE (OUTPATIENT)
Dept: PRIMARY CARE | Facility: CLINIC | Age: 59
End: 2024-09-15
Payer: COMMERCIAL

## 2024-09-15 DIAGNOSIS — E29.1 HYPOGONADISM MALE: ICD-10-CM

## 2024-09-16 RX ORDER — TESTOSTERONE CYPIONATE 200 MG/ML
INJECTION, SOLUTION INTRAMUSCULAR
Qty: 10 ML | Refills: 1 | Status: SHIPPED | OUTPATIENT
Start: 2024-09-16 | End: 2024-09-17 | Stop reason: SDUPTHER

## 2024-09-17 ENCOUNTER — LAB (OUTPATIENT)
Dept: LAB | Facility: LAB | Age: 59
End: 2024-09-17
Payer: COMMERCIAL

## 2024-09-17 ENCOUNTER — TELEPHONE (OUTPATIENT)
Dept: PRIMARY CARE | Facility: CLINIC | Age: 59
End: 2024-09-17

## 2024-09-17 DIAGNOSIS — E11.21 TYPE 2 DIABETES MELLITUS WITH DIABETIC NEPHROPATHY: ICD-10-CM

## 2024-09-17 DIAGNOSIS — N18.31 CHRONIC KIDNEY DISEASE, STAGE 3A (MULTI): Primary | ICD-10-CM

## 2024-09-17 DIAGNOSIS — E29.1 HYPOGONADISM MALE: ICD-10-CM

## 2024-09-17 LAB
ALBUMIN SERPL BCP-MCNC: 4.9 G/DL (ref 3.4–5)
ANION GAP SERPL CALC-SCNC: 11 MMOL/L (ref 10–20)
BUN SERPL-MCNC: 32 MG/DL (ref 6–23)
CALCIUM SERPL-MCNC: 10.7 MG/DL (ref 8.6–10.3)
CHLORIDE SERPL-SCNC: 100 MMOL/L (ref 98–107)
CO2 SERPL-SCNC: 29 MMOL/L (ref 21–32)
CREAT SERPL-MCNC: 1.72 MG/DL (ref 0.5–1.3)
CREAT UR-MCNC: 64.1 MG/DL (ref 20–370)
EGFRCR SERPLBLD CKD-EPI 2021: 45 ML/MIN/1.73M*2
ERYTHROCYTE [DISTWIDTH] IN BLOOD BY AUTOMATED COUNT: 13.4 % (ref 11.5–14.5)
GLUCOSE SERPL-MCNC: 113 MG/DL (ref 74–99)
HCT VFR BLD AUTO: 44.8 % (ref 41–52)
HGB BLD-MCNC: 14.7 G/DL (ref 13.5–17.5)
MCH RBC QN AUTO: 32.3 PG (ref 26–34)
MCHC RBC AUTO-ENTMCNC: 32.8 G/DL (ref 32–36)
MCV RBC AUTO: 99 FL (ref 80–100)
MICROALBUMIN UR-MCNC: 10.8 MG/L
MICROALBUMIN/CREAT UR: 16.8 UG/MG CREAT
NRBC BLD-RTO: 0 /100 WBCS (ref 0–0)
PHOSPHATE SERPL-MCNC: 3.6 MG/DL (ref 2.5–4.9)
PLATELET # BLD AUTO: 283 X10*3/UL (ref 150–450)
POTASSIUM SERPL-SCNC: 4.6 MMOL/L (ref 3.5–5.3)
RBC # BLD AUTO: 4.55 X10*6/UL (ref 4.5–5.9)
SODIUM SERPL-SCNC: 135 MMOL/L (ref 136–145)
WBC # BLD AUTO: 4.9 X10*3/UL (ref 4.4–11.3)

## 2024-09-17 PROCEDURE — 80069 RENAL FUNCTION PANEL: CPT

## 2024-09-17 PROCEDURE — 85027 COMPLETE CBC AUTOMATED: CPT

## 2024-09-17 PROCEDURE — 83036 HEMOGLOBIN GLYCOSYLATED A1C: CPT

## 2024-09-17 PROCEDURE — 36415 COLL VENOUS BLD VENIPUNCTURE: CPT

## 2024-09-17 PROCEDURE — 82570 ASSAY OF URINE CREATININE: CPT

## 2024-09-17 PROCEDURE — 82043 UR ALBUMIN QUANTITATIVE: CPT

## 2024-09-17 NOTE — TELEPHONE ENCOUNTER
Patient stopped in office requesting to talk to abrahan or someone in Dr. Quiroz's direct office. I informed patient I could take a message and relay the information. Patient states his testosterone medication was sent to [facility] but it needs to go to [facility]. Please advise.

## 2024-09-18 LAB
EST. AVERAGE GLUCOSE BLD GHB EST-MCNC: 143 MG/DL
HBA1C MFR BLD: 6.6 %

## 2024-09-18 RX ORDER — TESTOSTERONE CYPIONATE 200 MG/ML
INJECTION, SOLUTION INTRAMUSCULAR
Qty: 20 ML | Refills: 1 | Status: SHIPPED | OUTPATIENT
Start: 2024-09-18

## 2024-09-20 ENCOUNTER — TELEPHONE (OUTPATIENT)
Dept: PRIMARY CARE | Facility: CLINIC | Age: 59
End: 2024-09-20
Payer: COMMERCIAL

## 2024-09-20 DIAGNOSIS — E03.9 ACQUIRED HYPOTHYROIDISM: Primary | ICD-10-CM

## 2024-09-20 NOTE — LETTER
October 1, 2024       Dear Toribio Villatoro,    The Office of Dr. Gunner Quiroz MD is reaching out to you today to inform you that the Prior Authorization for Synthroid 75 mcg tablet   was DENIED by your insurance. The insurance denied coverage for the medication due to lack of trial of TWO preferred medications. Dr. Quiroz will prescribe a new medication from the list of four preferred your insurance has offered. Attached to this letter is the denial letter with full explanation. Your insurance will send you a detailed denial letter to your mailing address. If you have any questions please feel free to reach out to the office.    Have a great day,  The Office of Gunner Quiroz MD

## 2024-09-20 NOTE — TELEPHONE ENCOUNTER
Prior Authorization for Synthroid 75 mcg tablet   has been DENIED.     Appeal started.  Awaiting fax confirmation.    Denial letter scanned into media on 09.20.2024

## 2024-10-01 ENCOUNTER — PATIENT MESSAGE (OUTPATIENT)
Dept: PRIMARY CARE | Facility: CLINIC | Age: 59
End: 2024-10-01
Payer: COMMERCIAL

## 2024-10-01 ENCOUNTER — TELEPHONE (OUTPATIENT)
Dept: CARDIOLOGY | Facility: CLINIC | Age: 59
End: 2024-10-01
Payer: COMMERCIAL

## 2024-10-01 DIAGNOSIS — E11.9 TYPE 2 DIABETES MELLITUS WITHOUT COMPLICATION, WITHOUT LONG-TERM CURRENT USE OF INSULIN (MULTI): Primary | ICD-10-CM

## 2024-10-01 RX ORDER — BLOOD SUGAR DIAGNOSTIC
STRIP MISCELLANEOUS
Qty: 100 STRIP | Refills: 2 | Status: SHIPPED | OUTPATIENT
Start: 2024-10-01

## 2024-10-01 NOTE — TELEPHONE ENCOUNTER
Patient called and left a voicemail asking if he needed lab work prior to his next office visit with Dr. Jordan Modi DO FACPHILLIP. I returned his call leaving a voicemail and stated that Dr. Jordan Modi DO FACPHILLIP noted that he received his lab work through his PCP. He did have lab done on 7/31/24 with Dr. Quiroz.

## 2024-10-03 RX ORDER — LEVOTHYROXINE SODIUM 75 UG/1
75 TABLET ORAL DAILY
Qty: 30 TABLET | Refills: 11 | Status: SHIPPED | OUTPATIENT
Start: 2024-10-03 | End: 2025-10-03

## 2024-10-04 ENCOUNTER — HOSPITAL ENCOUNTER (OUTPATIENT)
Dept: RADIOLOGY | Facility: HOSPITAL | Age: 59
Discharge: HOME | End: 2024-10-04
Payer: COMMERCIAL

## 2024-10-04 DIAGNOSIS — N18.31 CHRONIC KIDNEY DISEASE, STAGE 3A (MULTI): ICD-10-CM

## 2024-10-04 PROCEDURE — 76770 US EXAM ABDO BACK WALL COMP: CPT

## 2024-10-09 ENCOUNTER — APPOINTMENT (OUTPATIENT)
Dept: CARDIOLOGY | Facility: CLINIC | Age: 59
End: 2024-10-09
Payer: COMMERCIAL

## 2024-10-09 VITALS
SYSTOLIC BLOOD PRESSURE: 104 MMHG | HEIGHT: 69 IN | DIASTOLIC BLOOD PRESSURE: 74 MMHG | BODY MASS INDEX: 39.52 KG/M2 | HEART RATE: 80 BPM | WEIGHT: 266.8 LBS

## 2024-10-09 DIAGNOSIS — E78.2 HYPERLIPEMIA, MIXED: ICD-10-CM

## 2024-10-09 DIAGNOSIS — I50.22 CHRONIC SYSTOLIC (CONGESTIVE) HEART FAILURE: ICD-10-CM

## 2024-10-09 DIAGNOSIS — E78.1 HYPERTRIGLYCERIDEMIA: ICD-10-CM

## 2024-10-09 DIAGNOSIS — I10 HTN (HYPERTENSION), BENIGN: ICD-10-CM

## 2024-10-09 DIAGNOSIS — I42.0 DILATED IDIOPATHIC CARDIOMYOPATHY (MULTI): ICD-10-CM

## 2024-10-09 DIAGNOSIS — E11.9 TYPE 2 DIABETES MELLITUS WITHOUT COMPLICATION, WITHOUT LONG-TERM CURRENT USE OF INSULIN (MULTI): ICD-10-CM

## 2024-10-09 DIAGNOSIS — Z78.9 NEVER SMOKED ANY SUBSTANCE: ICD-10-CM

## 2024-10-09 DIAGNOSIS — I51.7 LVH (LEFT VENTRICULAR HYPERTROPHY): ICD-10-CM

## 2024-10-09 PROCEDURE — 3048F LDL-C <100 MG/DL: CPT | Performed by: INTERNAL MEDICINE

## 2024-10-09 PROCEDURE — 3008F BODY MASS INDEX DOCD: CPT | Performed by: INTERNAL MEDICINE

## 2024-10-09 PROCEDURE — 3074F SYST BP LT 130 MM HG: CPT | Performed by: INTERNAL MEDICINE

## 2024-10-09 PROCEDURE — 1036F TOBACCO NON-USER: CPT | Performed by: INTERNAL MEDICINE

## 2024-10-09 PROCEDURE — 99214 OFFICE O/P EST MOD 30 MIN: CPT | Performed by: INTERNAL MEDICINE

## 2024-10-09 PROCEDURE — 3044F HG A1C LEVEL LT 7.0%: CPT | Performed by: INTERNAL MEDICINE

## 2024-10-09 PROCEDURE — 3061F NEG MICROALBUMINURIA REV: CPT | Performed by: INTERNAL MEDICINE

## 2024-10-09 PROCEDURE — 3078F DIAST BP <80 MM HG: CPT | Performed by: INTERNAL MEDICINE

## 2024-10-09 RX ORDER — AMLODIPINE BESYLATE 10 MG/1
10 TABLET ORAL DAILY
Qty: 90 TABLET | Refills: 3 | Status: SHIPPED | OUTPATIENT
Start: 2024-10-09 | End: 2025-10-09

## 2024-10-09 RX ORDER — ATORVASTATIN CALCIUM 10 MG/1
10 TABLET, FILM COATED ORAL DAILY
Qty: 90 TABLET | Refills: 3 | Status: SHIPPED | OUTPATIENT
Start: 2024-10-09

## 2024-10-09 RX ORDER — ICOSAPENT ETHYL 1 G/1
2 CAPSULE ORAL
Qty: 360 CAPSULE | Refills: 3 | Status: SHIPPED | OUTPATIENT
Start: 2024-10-09 | End: 2025-10-09

## 2024-10-09 RX ORDER — SACUBITRIL AND VALSARTAN 97; 103 MG/1; MG/1
1 TABLET, FILM COATED ORAL 2 TIMES DAILY
Qty: 180 TABLET | Refills: 3 | Status: SHIPPED | OUTPATIENT
Start: 2024-10-09 | End: 2025-10-09

## 2024-10-09 RX ORDER — FENOFIBRATE 160 MG/1
160 TABLET ORAL DAILY
Qty: 90 TABLET | Refills: 3 | Status: SHIPPED | OUTPATIENT
Start: 2024-10-09 | End: 2025-10-09

## 2024-10-09 RX ORDER — SPIRONOLACTONE 50 MG/1
50 TABLET, FILM COATED ORAL DAILY
Qty: 90 TABLET | Refills: 3 | Status: SHIPPED | OUTPATIENT
Start: 2024-10-09 | End: 2025-10-09

## 2024-10-09 RX ORDER — CARVEDILOL 25 MG/1
37.5 TABLET ORAL
Qty: 270 TABLET | Refills: 1 | Status: SHIPPED | OUTPATIENT
Start: 2024-10-09

## 2024-10-09 NOTE — PATIENT INSTRUCTIONS
BP instructions  Continue same medications and treatments.   Patient educated on proper medication use.   Patient educated on risk factor modification.   Please bring any lab results from other providers / physicians to your next appointment.     Please bring all medicines, vitamins, and herbal supplements with you when you come to the office.     Prescriptions will not be filled unless you are compliant with your follow up appointments or have a follow up appointment scheduled as per instruction of your physician. Refills should be requested at the time of your visit.  Refer to dietician   1  year visit

## 2024-10-09 NOTE — PROGRESS NOTES
CARDIOLOGY OFFICE VISIT      CHIEF COMPLAINT  Chief Complaint   Patient presents with    Follow-up     1 year follow up        HISTORY OF PRESENT ILLNESS  Patient is a 59-year-old  male with past medical history significant for dilated nonischemic cardiomyopathy EF 15-20%, chronic systolic congestive heart failure, hypertension, left ventricular hypertrophy, hyperlipidemia, diabetes mellitus, chronic kidney disease, obstructive sleep apnea on CPAP, obesity, rheumatoid arthritis presents for follow-up cardiovascular care.     Patient denies chest pain, dyspnea, palpitations.  Patient has occasional nausea when his rheumatoid arthritis pain is more severe precipitated by increase humidity.  Arthritis affects his hands, and shoulders.  Denies vomiting, dizziness, near-syncope, silvia syncope, edema.  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  Patient ambulates approximately 60 flights of stairs daily.  He is compliant with his CPAP.        Past medical history:  As above plus  Migraine headaches  Hypothyroid  Rheumatoid arthritis  Depression  Gastroesophageal reflux disease  Hypogonadism  Diabetes mellitus  Obstructive sleep apnea on CPAP     Social history:  Lifetime non-smoker  Occasional alcohol use     Family history:  Mother history of coronary artery disease in late 50s/early 60s with eventual coronary artery bypass  Brother history of coronary disease, myocardial infarction     Review of systems have been reviewed and are negative, noncontributory, or as previously mentioned x12 systems.    Assessment    Dilated nonischemic cardiomyopathy EF 39%  RVEF 43%  Chronic biventricular systolic congestive heart failure New York Heart Association class II-III  Hypertension  Left ventricular hypertrophy  Hyperlipidemia  Diabetes mellitus  Chronic kidney disease- Dr Handley  Obstructive sleep apnea and CPAP  Obesity  Rheumatoid arthritis.    Recommendations    Patient appears to  be stable from a cardiac standpoint. He remains compensated from a heart failure standpoint. Symptoms of angina. No symptomatic arrhythmia. Advise diet and weight loss.  Patient is very active at work.  Routine lab work through PCP. Follow-up with myself in 1 year. Refer to dietitian for diabetes and hypertriglyceridemia.     Please excuse any errors in grammar or translation related to this dictation. Voice recognition software was utilized to prepare this document.     Recent Cardiovascular Testing:  The following results have been reviewed and updated.   Labs 7/31/24  , HDL 40, LDL 74,    Cardiac cath 12/30/21  1. Normal coronaries  2. EF 15%     Echo 12/30/21  1. Mild dilation of the ascending aorta  2. EF 15-20%      Cardiac MRI 4/6/22  1.Dilated left ventricle. 39%  2.RVEF 43%  3.Moderate enhancement imaging is normal       VITALS  Vitals:    10/09/24 1531   BP: 104/74   Pulse: 80     Wt Readings from Last 4 Encounters:   10/09/24 121 kg (266 lb 12.8 oz)   08/06/24 123 kg (270 lb 12.8 oz)   04/02/24 123 kg (271 lb)   03/06/24 126 kg (278 lb 12.8 oz)       PHYSICAL EXAM:  GENERAL:  Well developed, well nourished, in no acute distress.  CHEST:  Symmetric and nontender.  NEURO/PSYCH:  Alert and oriented times three with approppriate behavior and responses.  NECK:  Supple, no JVD, no bruit.  LUNGS:  Clear to auscultation bilaterally, normal respiratory effort.  HEART:  Rate and rhythm REGULAR with no evident murmur, no gallop appreciated.    There are no rubs, clicks or heaves.  EXTREMITIES:  Warm with good color, no clubbing or cyanosis.  There is no edema noted.  PERIPHERAL VASCULAR:  Pulses present and equally palpable; 2+ throughout.    ASSESSMENT AND PLAN  Problem List Items Addressed This Visit          Cardiac and Vasculature    HTN (hypertension), benign    Relevant Medications    amLODIPine (Norvasc) 10 mg tablet    carvedilol (Coreg) 25 mg tablet    spironolactone (Aldactone) 50 mg tablet     Hyperlipemia, mixed    Relevant Medications    atorvastatin (Lipitor) 10 mg tablet    fenofibrate (Triglide) 160 mg tablet    icosapent ethyL (Vascepa) 1 gram capsule    LVH (left ventricular hypertrophy)    Relevant Medications    amLODIPine (Norvasc) 10 mg tablet    carvedilol (Coreg) 25 mg tablet    sacubitriL-valsartan (Entresto)  mg tablet    Chronic systolic (congestive) heart failure    Relevant Medications    amLODIPine (Norvasc) 10 mg tablet    carvedilol (Coreg) 25 mg tablet    sacubitriL-valsartan (Entresto)  mg tablet    Dilated idiopathic cardiomyopathy (Multi)    Relevant Medications    amLODIPine (Norvasc) 10 mg tablet    carvedilol (Coreg) 25 mg tablet    sacubitriL-valsartan (Entresto)  mg tablet       Endocrine/Metabolic    Type 2 diabetes mellitus without complication, without long-term current use of insulin (Multi)    Relevant Medications    fenofibrate (Triglide) 160 mg tablet    Other Relevant Orders    Referral to Nutrition Services    RESOLVED: BMI 39.0-39.9,adult    Relevant Orders    Referral to Nutrition Services       Tobacco    RESOLVED: Never smoked any substance     Other Visit Diagnoses       Hypertriglyceridemia        Relevant Medications    icosapent ethyL (Vascepa) 1 gram capsule            Past Medical History  Past Medical History:   Diagnosis Date    Allergic rhinitis due to pollen 05/16/2019    Seasonal allergic rhinitis due to pollen    Cardiomegaly 10/12/2022    LVH (left ventricular hypertrophy)    CHF (congestive heart failure)     Chronic kidney disease     Diabetes mellitus (Multi)     Dilated idiopathic cardiomyopathy (Multi) 02/11/2023    Disorder of white blood cells, unspecified 06/23/2021    Neutrophilic leukocytosis    Generalized hyperhidrosis     Hyperhidrosis    Hypertension     Myocarditis (Multi) 02/11/2023    NICM (nonischemic cardiomyopathy) (Multi) 02/11/2023    Other cardiomyopathies 10/12/2022    NICM (nonischemic cardiomyopathy)     Other hammer toe(s) (acquired), right foot 04/12/2022    Acquired hammertoe of right foot    Personal history of other benign neoplasm 05/20/2020    History of other benign neoplasm    Personal history of other diseases of the circulatory system 01/13/2022    History of myocarditis    Personal history of other specified conditions 06/23/2021    History of excessive sweating    Sleep apnea        Social History  Social History     Tobacco Use    Smoking status: Never    Smokeless tobacco: Never   Substance Use Topics    Alcohol use: Never    Drug use: Never       Family History     Family History   Problem Relation Name Age of Onset    Heart attack Mother Juanita         acute    Cancer Father Jl         Allergies:  Allergies   Allergen Reactions    Lisinopril Hallucinations and Other        Outpatient Medications:  Current Outpatient Medications   Medication Instructions    amLODIPine (NORVASC) 10 mg, oral, Daily    atorvastatin (LIPITOR) 10 mg, oral, Daily    baclofen (LIORESAL) 10 mg, oral, 3 times daily PRN    blood sugar diagnostic (Contour Next Test Strips) strip 1 strip in the morning and 1 strip in the evening.    carvedilol (COREG) 37.5 mg, oral, 2 times daily (morning and late afternoon)    DULoxetine (CYMBALTA) 60 mg, oral, 2 times daily    empagliflozin-linagliptin (Glyxambi) 25-5 mg 1 tablet, oral, Daily    ergocalciferol, vitamin D2, (VITAMIN D2 ORAL) 1 tablet, oral, Once    fenofibrate (TRIGLIDE) 160 mg, oral, Daily    FreeStyle glucose monitoring kit 1 each, miscellaneous, As needed    glycopyrrolate (ROBINUL) 2 mg, oral, 2 times daily    icosapent ethyL (VASCEPA) 2 g, oral, 2 times daily (morning and late afternoon)    lancets 33 gauge misc 1 Lancet, 2 times daily    levothyroxine (SYNTHROID, LEVOXYL) 75 mcg, oral, Daily, Take on an empty stomach at the same time each day, either 30 to 60 minutes prior to breakfast    metFORMIN (Glucophage) 500 mg tablet TAKE 1 TABLET BY MOUTH TWICE  DAILY     "multivit-min/iron/folic acid/K (MULTI-DAY PLUS MINERALS ORAL) 1 tablet, oral, Daily    oxybutynin XL (DITROPAN-XL) 10 mg, oral, Daily    pantoprazole (PROTONIX) 40 mg, oral, Daily before breakfast    Rinvoq 15 mg, oral, Daily    sacubitriL-valsartan (Entresto)  mg tablet 1 tablet, oral, 2 times daily    spironolactone (ALDACTONE) 50 mg, oral, Daily    Synthroid 75 mcg, oral, Daily    syringe with needle (BD Luer-Kate Syringe) 3 mL 18 x 1 1/2\" syringe 3 mL, miscellaneous, Every 14 days, Use as directed    syringe, disposable, (BD Luer-Kate Syringe) 3 mL syringe 1 each, miscellaneous, Every 14 days, 22G x 1' 3ML, Use as directed every other week for injections    testosterone cypionate (Depo-Testosterone) 200 mg/mL injection 400 mg intramuscularly every 3 weeks        Recent Lab Results:    CMP:    Lab Results   Component Value Date     (L) 09/17/2024    K 4.6 09/17/2024     09/17/2024    CO2 29 09/17/2024    BUN 32 (H) 09/17/2024    CREATININE 1.72 (H) 09/17/2024    GLUCOSE 113 (H) 09/17/2024    CALCIUM 10.7 (H) 09/17/2024       Magnesium:    No results found for: \"MG\"    Lipid Profile:    Lab Results   Component Value Date    TRIG 223 (H) 07/31/2024    HDL 40.8 07/31/2024    LDLCALC 74 07/31/2024    LDLDIRECT 60 08/02/2023       TSH:    Lab Results   Component Value Date    TSH 0.43 (L) 07/31/2024       BNP:   Lab Results   Component Value Date     (H) 12/30/2021        PT/INR:    Lab Results   Component Value Date    PROTIME 11.0 12/30/2021    INR 1.0 12/30/2021       HgBA1c:    Lab Results   Component Value Date    HGBA1C 6.6 (H) 09/17/2024       BMP:  Lab Results   Component Value Date     (L) 09/17/2024     07/31/2024     03/27/2024    K 4.6 09/17/2024    K 4.9 07/31/2024    K 4.6 03/27/2024     09/17/2024     07/31/2024     03/27/2024    CO2 29 09/17/2024    CO2 24 07/31/2024    CO2 24 03/27/2024    BUN 32 (H) 09/17/2024    BUN 28 (H) 07/31/2024    BUN " "29 (H) 03/27/2024    CREATININE 1.72 (H) 09/17/2024    CREATININE 2.01 (H) 07/31/2024    CREATININE 1.57 (H) 03/27/2024       CBC:  Lab Results   Component Value Date    WBC 4.9 09/17/2024    WBC 4.4 07/31/2024    WBC 4.2 (L) 03/27/2024    RBC 4.55 09/17/2024    RBC 4.44 (L) 07/31/2024    RBC 4.30 (L) 03/27/2024    HGB 14.7 09/17/2024    HGB 14.2 07/31/2024    HGB 14.3 03/27/2024    HCT 44.8 09/17/2024    HCT 44.0 07/31/2024    HCT 42.8 03/27/2024    MCV 99 09/17/2024    MCV 99 07/31/2024     03/27/2024    MCH 32.3 09/17/2024    MCH 32.0 07/31/2024    MCH 33.3 03/27/2024    MCHC 32.8 09/17/2024    MCHC 32.3 07/31/2024    MCHC 33.4 03/27/2024    RDW 13.4 09/17/2024    RDW 13.8 07/31/2024    RDW 13.1 03/27/2024     09/17/2024     07/31/2024     03/27/2024       Cardiac Enzymes:    No results found for: \"TROPHS\"    Hepatic Function Panel:    Lab Results   Component Value Date    ALKPHOS 22 (L) 07/31/2024    ALT 26 07/31/2024    AST 28 07/31/2024    PROT 7.0 07/31/2024    BILITOT 0.7 07/31/2024           Jordan Modi DO        "

## 2024-10-17 DIAGNOSIS — M54.2 NECK PAIN: ICD-10-CM

## 2024-10-17 RX ORDER — BACLOFEN 10 MG/1
10 TABLET ORAL 3 TIMES DAILY PRN
Qty: 270 TABLET | Refills: 2 | Status: SHIPPED | OUTPATIENT
Start: 2024-10-17

## 2024-10-28 ENCOUNTER — LAB (OUTPATIENT)
Dept: LAB | Facility: LAB | Age: 59
End: 2024-10-28
Payer: COMMERCIAL

## 2024-10-28 DIAGNOSIS — N18.31 CHRONIC KIDNEY DISEASE, STAGE 3A (MULTI): Primary | ICD-10-CM

## 2024-10-28 LAB
ALBUMIN SERPL BCP-MCNC: 4.6 G/DL (ref 3.4–5)
ANION GAP SERPL CALC-SCNC: 12 MMOL/L (ref 10–20)
APPEARANCE UR: CLEAR
BILIRUB UR STRIP.AUTO-MCNC: NEGATIVE MG/DL
BUN SERPL-MCNC: 24 MG/DL (ref 6–23)
C3 SERPL-MCNC: 104 MG/DL (ref 87–200)
C4 SERPL-MCNC: 17 MG/DL (ref 10–50)
CALCIUM SERPL-MCNC: 9.4 MG/DL (ref 8.6–10.3)
CHLORIDE SERPL-SCNC: 103 MMOL/L (ref 98–107)
CO2 SERPL-SCNC: 26 MMOL/L (ref 21–32)
COLOR UR: ABNORMAL
CREAT SERPL-MCNC: 1.55 MG/DL (ref 0.5–1.3)
CREAT UR-MCNC: 66.5 MG/DL (ref 20–370)
EGFRCR SERPLBLD CKD-EPI 2021: 51 ML/MIN/1.73M*2
ERYTHROCYTE [DISTWIDTH] IN BLOOD BY AUTOMATED COUNT: 13.3 % (ref 11.5–14.5)
GLUCOSE SERPL-MCNC: 100 MG/DL (ref 74–99)
GLUCOSE UR STRIP.AUTO-MCNC: ABNORMAL MG/DL
HCT VFR BLD AUTO: 42.1 % (ref 41–52)
HGB BLD-MCNC: 14.1 G/DL (ref 13.5–17.5)
KETONES UR STRIP.AUTO-MCNC: NEGATIVE MG/DL
LEUKOCYTE ESTERASE UR QL STRIP.AUTO: NEGATIVE
MCH RBC QN AUTO: 32.7 PG (ref 26–34)
MCHC RBC AUTO-ENTMCNC: 33.5 G/DL (ref 32–36)
MCV RBC AUTO: 98 FL (ref 80–100)
MICROALBUMIN UR-MCNC: 13.4 MG/L
MICROALBUMIN/CREAT UR: 20.2 UG/MG CREAT
NITRITE UR QL STRIP.AUTO: NEGATIVE
NRBC BLD-RTO: 0 /100 WBCS (ref 0–0)
PH UR STRIP.AUTO: 6 [PH]
PHOSPHATE SERPL-MCNC: 2.6 MG/DL (ref 2.5–4.9)
PLATELET # BLD AUTO: 281 X10*3/UL (ref 150–450)
POTASSIUM SERPL-SCNC: 4.5 MMOL/L (ref 3.5–5.3)
PROT UR STRIP.AUTO-MCNC: NEGATIVE MG/DL
RBC # BLD AUTO: 4.31 X10*6/UL (ref 4.5–5.9)
RBC # UR STRIP.AUTO: NEGATIVE /UL
SODIUM SERPL-SCNC: 136 MMOL/L (ref 136–145)
SP GR UR STRIP.AUTO: 1.02
UROBILINOGEN UR STRIP.AUTO-MCNC: NORMAL MG/DL
WBC # BLD AUTO: 6.6 X10*3/UL (ref 4.4–11.3)

## 2024-10-28 PROCEDURE — 81003 URINALYSIS AUTO W/O SCOPE: CPT

## 2024-10-28 PROCEDURE — 36415 COLL VENOUS BLD VENIPUNCTURE: CPT

## 2024-10-28 PROCEDURE — 82043 UR ALBUMIN QUANTITATIVE: CPT

## 2024-10-28 PROCEDURE — 82570 ASSAY OF URINE CREATININE: CPT

## 2024-10-28 PROCEDURE — 86225 DNA ANTIBODY NATIVE: CPT

## 2024-10-28 PROCEDURE — 86160 COMPLEMENT ANTIGEN: CPT

## 2024-10-28 PROCEDURE — 86038 ANTINUCLEAR ANTIBODIES: CPT

## 2024-10-28 PROCEDURE — 86235 NUCLEAR ANTIGEN ANTIBODY: CPT

## 2024-10-28 PROCEDURE — 85027 COMPLETE CBC AUTOMATED: CPT

## 2024-10-28 PROCEDURE — 80069 RENAL FUNCTION PANEL: CPT

## 2024-10-29 LAB
ANA PATTERN: ABNORMAL
ANA SER QL HEP2 SUBST: POSITIVE
ANA TITR SER IF: ABNORMAL {TITER}
CENTROMERE B AB SER-ACNC: <0.2 AI
CHROMATIN AB SERPL-ACNC: <0.2 AI
DSDNA AB SER-ACNC: <1 IU/ML
ENA JO1 AB SER QL IA: <0.2 AI
ENA RNP AB SER IA-ACNC: <0.2 AI
ENA SCL70 AB SER QL IA: <0.2 AI
ENA SM AB SER IA-ACNC: <0.2 AI
ENA SM+RNP AB SER QL IA: <0.2 AI
ENA SS-A AB SER IA-ACNC: <0.2 AI
ENA SS-B AB SER IA-ACNC: <0.2 AI
RIBOSOMAL P AB SER-ACNC: <0.2 AI

## 2024-11-04 ENCOUNTER — PATIENT MESSAGE (OUTPATIENT)
Dept: PRIMARY CARE | Facility: CLINIC | Age: 59
End: 2024-11-04
Payer: COMMERCIAL

## 2024-11-04 DIAGNOSIS — E03.9 ACQUIRED HYPOTHYROIDISM: Primary | ICD-10-CM

## 2024-11-05 NOTE — PATIENT COMMUNICATION
Would you like for this patient to complete any thyroid testing. He last completed it in July.     Please advise.

## 2024-11-13 ENCOUNTER — NUTRITION (OUTPATIENT)
Dept: NUTRITION | Facility: HOSPITAL | Age: 59
End: 2024-11-13
Payer: COMMERCIAL

## 2024-11-13 VITALS — WEIGHT: 239 LBS | BODY MASS INDEX: 35.4 KG/M2 | HEIGHT: 69 IN

## 2024-11-13 DIAGNOSIS — E11.9 TYPE 2 DIABETES MELLITUS WITHOUT COMPLICATION, WITHOUT LONG-TERM CURRENT USE OF INSULIN (MULTI): ICD-10-CM

## 2024-11-13 PROCEDURE — 97802 MEDICAL NUTRITION INDIV IN: CPT | Performed by: DIETITIAN, REGISTERED

## 2024-11-13 NOTE — PATIENT INSTRUCTIONS
Follow the Healthy Plate Method at meals- see handout.  This will help to increase your vegetable intake and decrease portion sizes of carbohydrates.  When eating starchy foods, try to eat whole grains like potato with the skin, whole grain breads and cereals, brown rices and pastas.  Include protein with all meals and snacks.  Lean protein are better for cholesterol levels such as chicken(no skin), fish (baked or broiled or grilled), low-fat dairy, eggs, and peanut butter or nuts.   - Protein drinks between meals such as Premier Protein, Muscle Milk or Fairlife can help curb appetite.  4.   Reduce your weight by 1-2# per week to reach your goal weight.      - Consider tracking your calorie intake on an tim such as [employer] or dscovered to track your calories.     -  Check out Intermittent Fasting video by joseph Intermittent Fasting 101 by Izzy Evans  5.   Increase fiber to 25-3g per day to help keep you shah and lower cholesterol levels.  You may consider a fiber supplement such as Benefiber or Metamucil everyday.    6.   Aim to drink 64 oz of water daily  7.   Aim for 30 minutes of exercise on most days.

## 2024-11-13 NOTE — PROGRESS NOTES
"Nutrition Assessment     Reason for Visit:  Toribio Villatoro is a 59 y.o. male who presents for nutrition counseling in the context of type II DM, CKD, and RA.      Anthropometrics:  Wt Readings from Last 10 Encounters:   11/13/24 108 kg (239 lb)   10/09/24 121 kg (266 lb 12.8 oz)   08/06/24 123 kg (270 lb 12.8 oz)   04/02/24 123 kg (271 lb)   03/06/24 126 kg (278 lb 12.8 oz)   12/05/23 123 kg (271 lb 3.2 oz)   10/11/23 124 kg (274 lb 4.8 oz)   08/08/23 129 kg (283 lb 9.6 oz)   04/04/23 120 kg (265 lb)   12/06/22 127 kg (279 lb 4 oz)     - tests BS with a glucometer  - stage CKD III    Anthropometrics  Height: 175.3 cm (5' 9\")  Weight: 108 kg (239 lb)  BMI (Calculated): 35.28  Weight Change  Weight History / % Weight Change: 39# weightt loss over 8 month- 14% loss over 8 moths    Lab Results   Component Value Date    HGBA1C 6.6 (H) 09/17/2024     Food And Nutrient Intake:  Food and Nutrient History  Food and Nutrient History: Pt presents for nutrition counseling seeking weight loss in the context of type II DM and CKDIII.  BMI of 35 indicates class II obesity.  Pt reports that his weight has gradually been decreasing over the past 8 months.  HgbA1c is down to <7.0.  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  His first meal is at dinner.  Pt is hesitant about increasing the frequency of his meals.  He states that it very difficult for him to store snacks daily in his truck.  Pt would benefit from a carbohydrate controlled diet of approx 2100 calories per day and smaller more frequent meals. Discussed tracking his intakes intermittently on an tim.  Fluid Intake: water- 64 oz daily  GI Symptoms: noneFood Intake  Meal 1: wake up at 2:30- leaves at 3:00am  Meal 2: skips  Meal 3: 1:00-4:00- dinner- chicken soup, leftovers; Fettucini denita- chicken and mushrooms; salads  Snacks: HS- popcorn and crackers       Physical Activities:  Physical Activity  Type of Physical Activity: no " "planned exercise- active job delivering     Nutrition Focused Physical Exam:  Deferred- no malnutrition suspected.       Energy Needs  Calculated Energy Needs Using Equations  Height: 175.3 cm (5' 9\")  Weight Used for Equation Calculations: 108 kg (239 lb)  Austin Gil Equation (Overweight or Obese Patients): 1879  Activity Factor: 1.4  Total Energy Needs: 2630  Estimated Energy Needs  Total Energy Estimated Needs (kCal): 2130 kCal  Method for Estimating Needs: MSJ x 1.4-500 kcals        Nutrition Diagnosis   Malnutrition Diagnosis  Patient has Malnutrition Diagnosis: No  Nutrition Diagnosis  Patient has Nutrition Diagnosis: Yes  Diagnosis Status (1): New  Nutrition Diagnosis 1: Not ready for diet/lifestyle change  Related to (1): improving meal routines with DMII  As Evidenced by (1): pt interview; diet recall  Additional Nutrition Diagnosis: Diagnosis 2  Nutrition Diagnosis 2: Food and nutrition related knowledge deficit  Related to (2): DMII, CKDIII  As Evidenced by (2): pt interview; diet recall    Nutrition Interventions/Recommendations   Nutrition Prescription  Individualized Nutrition Prescription Provided for : Recommend low sodium, 2100 kcal diet; 45-60g of carb per meal  Food and Nutrition Delivery  Meals & Snacks: General Healthful Diet, Diets modified for specific foods or ingredients, Mineral-modified diet  Nutrition Education  Nutrition Education Content: Content related nutrition education, Education on nutrition's influence on health, Physical activity guidance  Goals: Instructed on low-sodium and carb-controlled,  label reading, exercise, proper portion sizes, wt loss and general healthful nutrition        Patient Instructions   Follow the Healthy Plate Method at meals- see handout.  This will help to increase your vegetable intake and decrease portion sizes of carbohydrates.  When eating starchy foods, try to eat whole grains like potato with the skin, whole grain breads and cereals, brown " rices and pastas.  Include protein with all meals and snacks.  Lean protein are better for cholesterol levels such as chicken(no skin), fish (baked or broiled or grilled), low-fat dairy, eggs, and peanut butter or nuts.   - Protein drinks between meals such as Premier Protein, Muscle Milk or Fairlife can help curb appetite.  4.   Reduce your weight by 1-2# per week to reach your goal weight.      - Consider tracking your calorie intake on an tim such as [employer] or [employer] to track your calories.     -  Check out Intermittent Fasting video by joseph Intermittent Fasting 101 by Izzy Evans  5.   Increase fiber to 25-3g per day to help keep you shah and lower cholesterol levels.  You may consider a fiber supplement such as Benefiber or Metamucil everyday.    6.   Aim to drink 64 oz of water daily  7.   Aim for 30 minutes of exercise on most days.        Nutrition Monitoring and Evaluation   Food/Nutrient Related History Monitoring  Monitoring and Evaluation Plan: Energy intake, Fluid intake, Meal/snack pattern, Protein intake, Carbohydrate intake, Fiber intake  Criteria: consume 2100 kcal/day  Criteria: aim for 64 oz of water daily, limit sugar sweetened beveraged  Criteria: 2-3 meals per day with 1-2 snacks between meals  Criteria: ensure adequate protein at each meal and snack ~ 25-30 g/meal  Estimated carbohydrate intake: Estimated carbohydrate intake  Criteria: 45 g carb per meal  Criteria: ensure adequate fiber is present at meals - focus on 1/2 plate nonstarchy vegetables and choosing whole grain foods when able  Knowledge/Beliefs/Attitudes  Monitoring and Evaluation Plan: Physical activity  Criteria: Encouraged regular physical activity including strength training as medically appropriate per AHA guidelines  Body Composition/Growth/Weight History  Monitoring and Evaluation Plan: Weight change  Weight Change: Weight loss  Criteria: 1-2 lb wt loss per week  Biochemical Data, Medical Tests and  Procedures  Monitoring and Evaluation Plan: Glucose/endocrine profile  Glucose/Endocrine Profile: Hemoglobin A1c (HgbA1c), Glucose, fasting  Criteria: A1c <5.7%; fasting blood glucose  mg/dl      Readiness to Change : Good  Level of Understanding : Good  Anticipated Compliant : Good

## 2024-11-25 ENCOUNTER — LAB (OUTPATIENT)
Dept: LAB | Facility: LAB | Age: 59
End: 2024-11-25
Payer: COMMERCIAL

## 2024-11-25 DIAGNOSIS — N39.0 URINARY TRACT INFECTION, SITE NOT SPECIFIED: Primary | ICD-10-CM

## 2024-11-25 DIAGNOSIS — E03.9 ACQUIRED HYPOTHYROIDISM: ICD-10-CM

## 2024-11-25 DIAGNOSIS — N18.31 CHRONIC KIDNEY DISEASE, STAGE 3A (MULTI): ICD-10-CM

## 2024-11-25 DIAGNOSIS — E11.9 TYPE 2 DIABETES MELLITUS WITHOUT COMPLICATION, WITHOUT LONG-TERM CURRENT USE OF INSULIN (MULTI): ICD-10-CM

## 2024-11-25 DIAGNOSIS — E78.2 HYPERLIPEMIA, MIXED: ICD-10-CM

## 2024-11-25 DIAGNOSIS — I10 HTN (HYPERTENSION), BENIGN: ICD-10-CM

## 2024-11-25 LAB
ALBUMIN SERPL BCP-MCNC: 4.8 G/DL (ref 3.4–5)
ALP SERPL-CCNC: 21 U/L (ref 33–136)
ALT SERPL W P-5'-P-CCNC: 26 U/L (ref 10–52)
ANION GAP SERPL CALC-SCNC: 11 MMOL/L (ref 10–20)
APPEARANCE UR: CLEAR
AST SERPL W P-5'-P-CCNC: 31 U/L (ref 9–39)
BASOPHILS # BLD AUTO: 0.02 X10*3/UL (ref 0–0.1)
BASOPHILS NFR BLD AUTO: 0.4 %
BILIRUB SERPL-MCNC: 0.7 MG/DL (ref 0–1.2)
BILIRUB UR STRIP.AUTO-MCNC: NEGATIVE MG/DL
BUN SERPL-MCNC: 33 MG/DL (ref 6–23)
CALCIUM SERPL-MCNC: 9.8 MG/DL (ref 8.6–10.3)
CHLORIDE SERPL-SCNC: 105 MMOL/L (ref 98–107)
CHOLEST SERPL-MCNC: 124 MG/DL (ref 0–199)
CHOLESTEROL/HDL RATIO: 3.3
CO2 SERPL-SCNC: 25 MMOL/L (ref 21–32)
COLOR UR: ABNORMAL
CREAT SERPL-MCNC: 1.59 MG/DL (ref 0.5–1.3)
CREAT UR-MCNC: 69.1 MG/DL (ref 20–370)
EGFRCR SERPLBLD CKD-EPI 2021: 49 ML/MIN/1.73M*2
EOSINOPHIL # BLD AUTO: 0.02 X10*3/UL (ref 0–0.7)
EOSINOPHIL NFR BLD AUTO: 0.4 %
ERYTHROCYTE [DISTWIDTH] IN BLOOD BY AUTOMATED COUNT: 13.5 % (ref 11.5–14.5)
EST. AVERAGE GLUCOSE BLD GHB EST-MCNC: 137 MG/DL
GLUCOSE SERPL-MCNC: 76 MG/DL (ref 74–99)
GLUCOSE UR STRIP.AUTO-MCNC: ABNORMAL MG/DL
HBA1C MFR BLD: 6.4 %
HCT VFR BLD AUTO: 41.8 % (ref 41–52)
HDLC SERPL-MCNC: 37.4 MG/DL
HGB BLD-MCNC: 13.9 G/DL (ref 13.5–17.5)
IMM GRANULOCYTES # BLD AUTO: 0.08 X10*3/UL (ref 0–0.7)
IMM GRANULOCYTES NFR BLD AUTO: 1.5 % (ref 0–0.9)
KETONES UR STRIP.AUTO-MCNC: NEGATIVE MG/DL
LDLC SERPL CALC-MCNC: 51 MG/DL
LEUKOCYTE ESTERASE UR QL STRIP.AUTO: NEGATIVE
LYMPHOCYTES # BLD AUTO: 1.47 X10*3/UL (ref 1.2–4.8)
LYMPHOCYTES NFR BLD AUTO: 27.1 %
MCH RBC QN AUTO: 32.8 PG (ref 26–34)
MCHC RBC AUTO-ENTMCNC: 33.3 G/DL (ref 32–36)
MCV RBC AUTO: 99 FL (ref 80–100)
MICROALBUMIN UR-MCNC: 10.7 MG/L
MICROALBUMIN/CREAT UR: 15.5 UG/MG CREAT
MONOCYTES # BLD AUTO: 0.76 X10*3/UL (ref 0.1–1)
MONOCYTES NFR BLD AUTO: 14 %
NEUTROPHILS # BLD AUTO: 3.08 X10*3/UL (ref 1.2–7.7)
NEUTROPHILS NFR BLD AUTO: 56.6 %
NITRITE UR QL STRIP.AUTO: NEGATIVE
NON HDL CHOLESTEROL: 87 MG/DL (ref 0–149)
NRBC BLD-RTO: 0 /100 WBCS (ref 0–0)
PH UR STRIP.AUTO: 6 [PH]
PHOSPHATE SERPL-MCNC: 3.8 MG/DL (ref 2.5–4.9)
PLATELET # BLD AUTO: 281 X10*3/UL (ref 150–450)
POTASSIUM SERPL-SCNC: 4.4 MMOL/L (ref 3.5–5.3)
PROT SERPL-MCNC: 7.1 G/DL (ref 6.4–8.2)
PROT UR STRIP.AUTO-MCNC: NEGATIVE MG/DL
RBC # BLD AUTO: 4.24 X10*6/UL (ref 4.5–5.9)
RBC # UR STRIP.AUTO: NEGATIVE /UL
SODIUM SERPL-SCNC: 137 MMOL/L (ref 136–145)
SP GR UR STRIP.AUTO: 1.02
T4 FREE SERPL-MCNC: 0.89 NG/DL (ref 0.61–1.12)
TRIGL SERPL-MCNC: 180 MG/DL (ref 0–149)
TSH SERPL-ACNC: 0.3 MIU/L (ref 0.44–3.98)
UROBILINOGEN UR STRIP.AUTO-MCNC: NORMAL MG/DL
VLDL: 36 MG/DL (ref 0–40)
WBC # BLD AUTO: 5.4 X10*3/UL (ref 4.4–11.3)

## 2024-11-25 PROCEDURE — 36415 COLL VENOUS BLD VENIPUNCTURE: CPT

## 2024-11-25 PROCEDURE — 82570 ASSAY OF URINE CREATININE: CPT

## 2024-11-25 PROCEDURE — 84439 ASSAY OF FREE THYROXINE: CPT

## 2024-11-25 PROCEDURE — 84100 ASSAY OF PHOSPHORUS: CPT

## 2024-11-25 PROCEDURE — 85025 COMPLETE CBC W/AUTO DIFF WBC: CPT

## 2024-11-25 PROCEDURE — 81003 URINALYSIS AUTO W/O SCOPE: CPT

## 2024-11-25 PROCEDURE — 83036 HEMOGLOBIN GLYCOSYLATED A1C: CPT

## 2024-11-25 PROCEDURE — 87086 URINE CULTURE/COLONY COUNT: CPT

## 2024-11-25 PROCEDURE — 84443 ASSAY THYROID STIM HORMONE: CPT

## 2024-11-25 PROCEDURE — 80053 COMPREHEN METABOLIC PANEL: CPT

## 2024-11-25 PROCEDURE — 82043 UR ALBUMIN QUANTITATIVE: CPT

## 2024-11-25 PROCEDURE — 80061 LIPID PANEL: CPT

## 2024-11-26 LAB — BACTERIA UR CULT: NO GROWTH

## 2024-12-02 DIAGNOSIS — F33.9 RECURRENT MAJOR DEPRESSIVE DISORDER, REMISSION STATUS UNSPECIFIED (CMS-HCC): Primary | ICD-10-CM

## 2024-12-02 RX ORDER — DULOXETIN HYDROCHLORIDE 60 MG/1
60 CAPSULE, DELAYED RELEASE ORAL 2 TIMES DAILY
Qty: 180 CAPSULE | Refills: 3 | Status: SHIPPED | OUTPATIENT
Start: 2024-12-02 | End: 2025-12-02

## 2024-12-02 NOTE — TELEPHONE ENCOUNTER
Rx Refill Request     Name: Toribio ZACARIAS Devangde  :  1965   Medication Name:    DULoxetine (Cymbalta) 60 mg DR capsule    Last fill: 10.01.2024 90 day supply Q 180 R 0  Specific Pharmacy location:  [facility]  Date of last appointment:  2024   Date of next appointment:  12/3/2024   Best number to reach patient:  [phone]       RX PENDED to [facility] as directed by Electronic Correspondence.

## 2024-12-03 ENCOUNTER — APPOINTMENT (OUTPATIENT)
Dept: PRIMARY CARE | Facility: CLINIC | Age: 59
End: 2024-12-03
Payer: COMMERCIAL

## 2024-12-03 VITALS
SYSTOLIC BLOOD PRESSURE: 130 MMHG | WEIGHT: 263.4 LBS | OXYGEN SATURATION: 92 % | DIASTOLIC BLOOD PRESSURE: 66 MMHG | RESPIRATION RATE: 18 BRPM | HEIGHT: 69 IN | HEART RATE: 93 BPM | BODY MASS INDEX: 39.01 KG/M2 | TEMPERATURE: 98.2 F

## 2024-12-03 DIAGNOSIS — E11.9 TYPE 2 DIABETES MELLITUS WITHOUT COMPLICATION, WITHOUT LONG-TERM CURRENT USE OF INSULIN (MULTI): ICD-10-CM

## 2024-12-03 DIAGNOSIS — I10 HTN (HYPERTENSION), BENIGN: Primary | ICD-10-CM

## 2024-12-03 DIAGNOSIS — E03.9 ACQUIRED HYPOTHYROIDISM: ICD-10-CM

## 2024-12-03 DIAGNOSIS — M05.79 RHEUMATOID ARTHRITIS INVOLVING MULTIPLE SITES WITH POSITIVE RHEUMATOID FACTOR (MULTI): ICD-10-CM

## 2024-12-03 PROBLEM — F32.A DEPRESSION: Status: RESOLVED | Noted: 2023-02-11 | Resolved: 2024-12-03

## 2024-12-03 PROCEDURE — 3061F NEG MICROALBUMINURIA REV: CPT | Performed by: FAMILY MEDICINE

## 2024-12-03 PROCEDURE — 3048F LDL-C <100 MG/DL: CPT | Performed by: FAMILY MEDICINE

## 2024-12-03 PROCEDURE — 99214 OFFICE O/P EST MOD 30 MIN: CPT | Performed by: FAMILY MEDICINE

## 2024-12-03 PROCEDURE — 1036F TOBACCO NON-USER: CPT | Performed by: FAMILY MEDICINE

## 2024-12-03 PROCEDURE — 3075F SYST BP GE 130 - 139MM HG: CPT | Performed by: FAMILY MEDICINE

## 2024-12-03 PROCEDURE — 3008F BODY MASS INDEX DOCD: CPT | Performed by: FAMILY MEDICINE

## 2024-12-03 PROCEDURE — 3078F DIAST BP <80 MM HG: CPT | Performed by: FAMILY MEDICINE

## 2024-12-03 PROCEDURE — 3044F HG A1C LEVEL LT 7.0%: CPT | Performed by: FAMILY MEDICINE

## 2024-12-03 RX ORDER — LEVOTHYROXINE SODIUM 75 UG/1
TABLET ORAL
Qty: 90 TABLET | Refills: 3 | Status: SHIPPED | OUTPATIENT
Start: 2024-12-03

## 2024-12-03 RX ORDER — HYDROXYCHLOROQUINE SULFATE 200 MG/1
400 TABLET, FILM COATED ORAL DAILY
COMMUNITY
Start: 2024-10-16

## 2024-12-03 RX ORDER — EMPAGLIFLOZIN 10 MG/1
10 TABLET, FILM COATED ORAL DAILY
COMMUNITY
Start: 2024-11-26

## 2024-12-03 RX ORDER — METHYLPREDNISOLONE ACETATE 80 MG/ML
80 INJECTION, SUSPENSION INTRA-ARTICULAR; INTRALESIONAL; INTRAMUSCULAR; SOFT TISSUE ONCE
Status: SHIPPED | OUTPATIENT
Start: 2024-12-03

## 2024-12-03 ASSESSMENT — PATIENT HEALTH QUESTIONNAIRE - PHQ9
1. LITTLE INTEREST OR PLEASURE IN DOING THINGS: NOT AT ALL
SUM OF ALL RESPONSES TO PHQ9 QUESTIONS 1 AND 2: 0
2. FEELING DOWN, DEPRESSED OR HOPELESS: NOT AT ALL

## 2024-12-03 ASSESSMENT — ENCOUNTER SYMPTOMS
OCCASIONAL FEELINGS OF UNSTEADINESS: 0
LOSS OF SENSATION IN FEET: 0
DEPRESSION: 0

## 2024-12-03 NOTE — PROGRESS NOTES
"Subjective   Patient ID:  Toribio Villatoro is a 59 y.o. male patient who presents today for Diabetes, Hypertension, Hyperlipidemia, Depression, and Obesity.    Diabetes: Most recent blood work shows blood glucose 76 mg/dL and HbA1C 6.4%.     Hypertension: Blood pressure is normal.     eGFR dropped to 49. Patient is on Jardiance.     Hyperlipidemia: Triglycerides remain elevated but improved. Cholesterol levels are well controlled.     This patient has not been diagnosed with depression.  He is on duloxetine for pain not depression.     TSH is slightly low. Patient reports feeling more fatigued than usual. He is unsure if the fatigue is related to levothyroxine or due to work load. Will decrease levothyroxine dosage.     Patient requests a cortisone injection    Patient will receive influenza vaccine in 2-3 weeks.     Objective   Vitals:  /66   Pulse 93   Temp 36.8 °C (98.2 °F)   Resp 18   Ht 1.753 m (5' 9\")   Wt 119 kg (263 lb 6.4 oz)   SpO2 92%   BMI 38.90 kg/m²       Physical Exam  Vitals reviewed.   Constitutional:       Appearance: Normal appearance.   Neck:      Vascular: No carotid bruit.   Cardiovascular:      Rate and Rhythm: Normal rate and regular rhythm.      Pulses: Normal pulses.      Heart sounds: Normal heart sounds.   Pulmonary:      Effort: Pulmonary effort is normal. No respiratory distress.      Breath sounds: Normal breath sounds. No wheezing.   Abdominal:      General: There is no distension.      Palpations: Abdomen is soft. There is no mass.      Tenderness: There is no abdominal tenderness. There is no right CVA tenderness, left CVA tenderness, guarding or rebound.   Musculoskeletal:      Cervical back: Normal range of motion and neck supple. No rigidity.      Right lower leg: No edema.      Left lower leg: No edema.   Lymphadenopathy:      Cervical: No cervical adenopathy.   Neurological:      Mental Status: He is alert.         Labs reviewed from:   11/25/24    CMP, CBC, Lipid, " HgA1C 6.4 %       Assessment/Plan   Problem List Items Addressed This Visit          Cardiac and Vasculature    HTN (hypertension), benign - Primary    Overview                Current Assessment & Plan      Is well controlled, continue with current medications.           Relevant Orders    CBC and Auto Differential    Lipid Panel    Comprehensive Metabolic Panel       Endocrine/Metabolic    Type 2 diabetes mellitus without complication, without long-term current use of insulin (Multi)    Current Assessment & Plan      Is stable, continue with current treatment.           Relevant Orders    Hemoglobin A1C    Follow Up In [facility] - PCP - Established    Acquired hypothyroidism    Relevant Medications    levothyroxine (Synthroid, Levoxyl) 75 mcg tablet       Multi-system (Lupus, Sarcoid)    Rheumatoid arthritis    Relevant Medications    methylPREDNISolone acetate (DEPO-Medrol) injection 80 mg (Start on 12/3/2024  4:45 PM)     Hypothyroidism is stable and TSH low will decrease dose.  We will have him take the 75 mcg 6 days a week instead of 7.  Rheumatoid arthritis is flared up we will give methylprednisolone injection.  The patient is advised to delay his flu shot for a couple weeks to let the cortisone get out of the system.    Follow up in: 4 month(s) or sooner if needed with labs prior.       Scribe Attestation  By signing my name below, IJasmin , Scribe attest that this documentation has been prepared under the direction and in the presence of Gunner Quiroz MD.

## 2024-12-08 DIAGNOSIS — K21.9 GASTROESOPHAGEAL REFLUX DISEASE WITHOUT ESOPHAGITIS: ICD-10-CM

## 2024-12-09 RX ORDER — PANTOPRAZOLE SODIUM 40 MG/1
40 TABLET, DELAYED RELEASE ORAL
Qty: 90 TABLET | Refills: 3 | Status: SHIPPED | OUTPATIENT
Start: 2024-12-09

## 2024-12-15 DIAGNOSIS — E29.1 TESTICULAR HYPOFUNCTION: ICD-10-CM

## 2024-12-16 RX ORDER — SYRINGE WITH NEEDLE, 1 ML 25GX5/8"
SYRINGE, EMPTY DISPOSABLE MISCELLANEOUS
Qty: 6 EACH | Refills: 9 | Status: SHIPPED | OUTPATIENT
Start: 2024-12-16

## 2024-12-27 ENCOUNTER — PATIENT MESSAGE (OUTPATIENT)
Dept: PRIMARY CARE | Facility: CLINIC | Age: 59
End: 2024-12-27
Payer: COMMERCIAL

## 2024-12-27 DIAGNOSIS — E29.1 HYPOGONADISM MALE: ICD-10-CM

## 2024-12-30 RX ORDER — TESTOSTERONE CYPIONATE 200 MG/ML
INJECTION, SOLUTION INTRAMUSCULAR
Qty: 26 ML | Refills: 1 | Status: SHIPPED | OUTPATIENT
Start: 2024-12-30

## 2025-01-27 ENCOUNTER — TELEPHONE (OUTPATIENT)
Dept: CARDIOLOGY | Facility: CLINIC | Age: 60
End: 2025-01-27
Payer: COMMERCIAL

## 2025-02-03 ENCOUNTER — TELEPHONE (OUTPATIENT)
Dept: PRIMARY CARE | Facility: CLINIC | Age: 60
End: 2025-02-03
Payer: COMMERCIAL

## 2025-02-05 NOTE — TELEPHONE ENCOUNTER
Prior Authorization  has been APPROVED.    Approval valid through 02.05.2026  Approval letter scanned into media on 02.05.2025

## 2025-02-23 ENCOUNTER — PATIENT MESSAGE (OUTPATIENT)
Dept: PRIMARY CARE | Facility: CLINIC | Age: 60
End: 2025-02-23
Payer: COMMERCIAL

## 2025-02-23 DIAGNOSIS — E11.9 TYPE 2 DIABETES MELLITUS WITHOUT COMPLICATION, WITHOUT LONG-TERM CURRENT USE OF INSULIN (MULTI): Primary | ICD-10-CM

## 2025-02-24 RX ORDER — EMPAGLIFLOZIN AND LINAGLIPTIN 25; 5 MG/1; MG/1
1 TABLET, FILM COATED ORAL DAILY
Qty: 90 TABLET | Refills: 3 | Status: SHIPPED | OUTPATIENT
Start: 2025-02-24 | End: 2025-02-26 | Stop reason: SDUPTHER

## 2025-02-26 ENCOUNTER — PATIENT MESSAGE (OUTPATIENT)
Dept: PRIMARY CARE | Facility: CLINIC | Age: 60
End: 2025-02-26
Payer: COMMERCIAL

## 2025-02-26 DIAGNOSIS — E29.1 TESTICULAR HYPOFUNCTION: Primary | ICD-10-CM

## 2025-02-26 RX ORDER — EMPAGLIFLOZIN AND LINAGLIPTIN 25; 5 MG/1; MG/1
1 TABLET, FILM COATED ORAL DAILY
Qty: 90 TABLET | Refills: 3 | Status: SHIPPED | OUTPATIENT
Start: 2025-02-26 | End: 2026-02-26

## 2025-02-26 RX ORDER — SYRINGE WITH NEEDLE, 1 ML 25GX5/8"
SYRINGE, EMPTY DISPOSABLE MISCELLANEOUS
Qty: 100 EACH | Refills: 11 | Status: SHIPPED | OUTPATIENT
Start: 2025-02-26

## 2025-03-31 DIAGNOSIS — I10 HTN (HYPERTENSION), BENIGN: ICD-10-CM

## 2025-03-31 DIAGNOSIS — I51.7 LVH (LEFT VENTRICULAR HYPERTROPHY): ICD-10-CM

## 2025-03-31 RX ORDER — CARVEDILOL 25 MG/1
37.5 TABLET ORAL
Qty: 270 TABLET | Refills: 1 | Status: SHIPPED | OUTPATIENT
Start: 2025-03-31

## 2025-03-31 NOTE — TELEPHONE ENCOUNTER
Received request for prescription refills for patient.   Patient follows with Jordan Modi D.O.      Request is for Carvedilol  Is patient currently on medication yes    Last OV 1/27/25  Next OV 10/8/25    Pended for signing and sent to provider

## 2025-04-02 ENCOUNTER — HOSPITAL ENCOUNTER (EMERGENCY)
Facility: HOSPITAL | Age: 60
Discharge: HOME | End: 2025-04-02
Attending: EMERGENCY MEDICINE
Payer: COMMERCIAL

## 2025-04-02 ENCOUNTER — APPOINTMENT (OUTPATIENT)
Dept: RADIOLOGY | Facility: HOSPITAL | Age: 60
End: 2025-04-02
Payer: COMMERCIAL

## 2025-04-02 VITALS
OXYGEN SATURATION: 96 % | DIASTOLIC BLOOD PRESSURE: 67 MMHG | TEMPERATURE: 97.7 F | HEART RATE: 83 BPM | SYSTOLIC BLOOD PRESSURE: 131 MMHG | BODY MASS INDEX: 35.07 KG/M2 | RESPIRATION RATE: 16 BRPM | HEIGHT: 70 IN | WEIGHT: 245 LBS

## 2025-04-02 DIAGNOSIS — R29.898 WEAKNESS OF LEFT LOWER EXTREMITY: Primary | ICD-10-CM

## 2025-04-02 LAB
ALBUMIN SERPL-MCNC: 4.6 G/DL (ref 3.6–5.1)
ALP SERPL-CCNC: 23 U/L (ref 35–144)
ALT SERPL-CCNC: 19 U/L (ref 9–46)
ANION GAP SERPL CALCULATED.4IONS-SCNC: 8 MMOL/L (CALC) (ref 7–17)
AST SERPL-CCNC: 28 U/L (ref 10–35)
BASOPHILS # BLD AUTO: 8 CELLS/UL (ref 0–200)
BASOPHILS NFR BLD AUTO: 0.2 %
BILIRUB SERPL-MCNC: 0.5 MG/DL (ref 0.2–1.2)
BUN SERPL-MCNC: 22 MG/DL (ref 7–25)
CALCIUM SERPL-MCNC: 9.7 MG/DL (ref 8.6–10.3)
CHLORIDE SERPL-SCNC: 102 MMOL/L (ref 98–110)
CHOLEST SERPL-MCNC: 131 MG/DL
CHOLEST/HDLC SERPL: 3.2 (CALC)
CO2 SERPL-SCNC: 26 MMOL/L (ref 20–32)
CREAT SERPL-MCNC: 1.7 MG/DL (ref 0.7–1.35)
EGFRCR SERPLBLD CKD-EPI 2021: 45 ML/MIN/1.73M2
EOSINOPHIL # BLD AUTO: 28 CELLS/UL (ref 15–500)
EOSINOPHIL NFR BLD AUTO: 0.7 %
ERYTHROCYTE [DISTWIDTH] IN BLOOD BY AUTOMATED COUNT: 13.6 % (ref 11–15)
EST. AVERAGE GLUCOSE BLD GHB EST-MCNC: 146 MG/DL
EST. AVERAGE GLUCOSE BLD GHB EST-SCNC: 8.1 MMOL/L
GLUCOSE SERPL-MCNC: 62 MG/DL (ref 65–99)
HBA1C MFR BLD: 6.7 % OF TOTAL HGB
HCT VFR BLD AUTO: 41.2 % (ref 38.5–50)
HDLC SERPL-MCNC: 41 MG/DL
HGB BLD-MCNC: 14 G/DL (ref 13.2–17.1)
LDLC SERPL CALC-MCNC: 66 MG/DL (CALC)
LYMPHOCYTES # BLD AUTO: 1328 CELLS/UL (ref 850–3900)
LYMPHOCYTES NFR BLD AUTO: 33.2 %
MCH RBC QN AUTO: 33 PG (ref 27–33)
MCHC RBC AUTO-ENTMCNC: 34 G/DL (ref 32–36)
MCV RBC AUTO: 97.2 FL (ref 80–100)
MONOCYTES # BLD AUTO: 568 CELLS/UL (ref 200–950)
MONOCYTES NFR BLD AUTO: 14.2 %
NEUTROPHILS # BLD AUTO: 2068 CELLS/UL (ref 1500–7800)
NEUTROPHILS NFR BLD AUTO: 51.7 %
NONHDLC SERPL-MCNC: 90 MG/DL (CALC)
PLATELET # BLD AUTO: 282 THOUSAND/UL (ref 140–400)
PMV BLD REES-ECKER: 9.7 FL (ref 7.5–12.5)
POTASSIUM SERPL-SCNC: 4.6 MMOL/L (ref 3.5–5.3)
PROT SERPL-MCNC: 6.7 G/DL (ref 6.1–8.1)
RBC # BLD AUTO: 4.24 MILLION/UL (ref 4.2–5.8)
SODIUM SERPL-SCNC: 136 MMOL/L (ref 135–146)
TRIGL SERPL-MCNC: 166 MG/DL
WBC # BLD AUTO: 4 THOUSAND/UL (ref 3.8–10.8)

## 2025-04-02 PROCEDURE — 73502 X-RAY EXAM HIP UNI 2-3 VIEWS: CPT | Mod: RT

## 2025-04-02 PROCEDURE — 73560 X-RAY EXAM OF KNEE 1 OR 2: CPT | Mod: RIGHT SIDE | Performed by: RADIOLOGY

## 2025-04-02 PROCEDURE — 73502 X-RAY EXAM HIP UNI 2-3 VIEWS: CPT | Mod: RIGHT SIDE | Performed by: RADIOLOGY

## 2025-04-02 PROCEDURE — 73560 X-RAY EXAM OF KNEE 1 OR 2: CPT | Mod: RT

## 2025-04-02 PROCEDURE — 99284 EMERGENCY DEPT VISIT MOD MDM: CPT | Performed by: EMERGENCY MEDICINE

## 2025-04-02 RX ORDER — PREDNISONE 50 MG/1
50 TABLET ORAL DAILY
Qty: 5 TABLET | Refills: 0 | Status: SHIPPED | OUTPATIENT
Start: 2025-04-02 | End: 2025-04-07

## 2025-04-02 ASSESSMENT — LIFESTYLE VARIABLES
HAVE PEOPLE ANNOYED YOU BY CRITICIZING YOUR DRINKING: NO
EVER HAD A DRINK FIRST THING IN THE MORNING TO STEADY YOUR NERVES TO GET RID OF A HANGOVER: NO
EVER FELT BAD OR GUILTY ABOUT YOUR DRINKING: NO
TOTAL SCORE: 0
HAVE YOU EVER FELT YOU SHOULD CUT DOWN ON YOUR DRINKING: NO

## 2025-04-02 ASSESSMENT — PAIN DESCRIPTION - ORIENTATION: ORIENTATION: RIGHT

## 2025-04-02 ASSESSMENT — PAIN SCALES - GENERAL: PAINLEVEL_OUTOF10: 7

## 2025-04-02 ASSESSMENT — PAIN - FUNCTIONAL ASSESSMENT: PAIN_FUNCTIONAL_ASSESSMENT: 0-10

## 2025-04-02 ASSESSMENT — PAIN DESCRIPTION - ONSET: ONSET: PROGRESSIVE

## 2025-04-02 ASSESSMENT — PAIN DESCRIPTION - FREQUENCY: FREQUENCY: INTERMITTENT

## 2025-04-02 ASSESSMENT — PAIN DESCRIPTION - PROGRESSION: CLINICAL_PROGRESSION: GRADUALLY WORSENING

## 2025-04-02 ASSESSMENT — COLUMBIA-SUICIDE SEVERITY RATING SCALE - C-SSRS
1. IN THE PAST MONTH, HAVE YOU WISHED YOU WERE DEAD OR WISHED YOU COULD GO TO SLEEP AND NOT WAKE UP?: NO
2. HAVE YOU ACTUALLY HAD ANY THOUGHTS OF KILLING YOURSELF?: NO
6. HAVE YOU EVER DONE ANYTHING, STARTED TO DO ANYTHING, OR PREPARED TO DO ANYTHING TO END YOUR LIFE?: NO

## 2025-04-02 ASSESSMENT — PAIN DESCRIPTION - LOCATION: LOCATION: HIP

## 2025-04-02 ASSESSMENT — PAIN DESCRIPTION - PAIN TYPE: TYPE: ACUTE PAIN

## 2025-04-02 NOTE — DISCHARGE INSTRUCTIONS
"\"You were seen in the Emergency Department (ED) for right leg weakness. Your work-up and exam was unimpressive for critical or emergent cause at this time requiring admission.    You have been prescribed prednisone 50 mg for 5 days.  Please take 1 pill/day, if you if you miss a dose please do not double dose the next day, you may extend your course to 6-days to ensure all 5 pills are taken.    Follow-up with the orthopedic doctor you have been referred to, Dr. Christina within 48 hours regarding your ED visit.    Seek immediate medical attention should you have:  fevers of 38C (100.4 F) or higher, shortness of breath, chest pain, weakness, confusion, vomiting, or any worsening or concerning symptoms.\"  "

## 2025-04-02 NOTE — ED PROVIDER NOTES
Emergency Department Provider Note        History of Present Illness     History provided by: Patient  Limitations to History: None  External Records Reviewed with Brief Summary: None    HPI:  Toribio Villatoro is a 59 y.o. male with history of lumbar radiculopathy, rheumatoid arthritis, type 2 diabetes, HLD, HTN, and dilated cardiomyopathy, presents to the ED with complaint of right hip pain.  Stated that he pain started on Saturday evening and radiates to his right groin. On , he began to feel right lower extremity weakness.  Stated that he used ice and heat pack for the hip pain, which provided some relief. Stated that he is a  and was able to go to work on Monday. Still he went to [employer].  Work, and while he was shopping at [employer], he felt his right knee was going to give out on him. Patient states that he fell on his knee trying to get in his truck to come to the ED. Denies hitting his head or loss of consciousness.  Denies numbness, tingling, or swelling.  Denies back pain, fever, abdominal pain, nausea, vomiting, chest pain, shortness of breath, or any other associated symptoms.    Physical Exam   Triage vitals:  T 36.5 °C (97.7 °F)  HR 96  /81  RR 18  O2 95 % None (Room air)    General: Awake, alert, in no acute distress  Eyes: Gaze conjugate.  No scleral icterus or injection  HENT: Normo-cephalic, atraumatic. No stridor  CV: Tachycardic rate, regular rhythm. Radial pulses 2+ bilaterally  Resp: Breathing non-labored, speaking in full sentences.  Clear to auscultation bilaterally  GI: Soft, non-distended, non-tender. No rebound or guarding.  : Not examined.  MSK/Extremities: No gross bony deformities. Moving all extremities  Skin: Warm. Appropriate color  Neuro: Alert. Oriented. Face symmetric. Speech is fluent.  Gross strength and sensation intact in b/l UE and LEs  Psych: Appropriate mood and affect    Medical Decision Making & ED Course   Medical Decision Makin y.o.  male with history of lumbar radiculopathy, rheumatoid arthritis, type 2 diabetes, HLD, HTN, and dilated cardiomyopathy, presents to the ED with complaint of right hip pain. The patient was seen and evaluated by me.  The patient was awake, alert, and oriented.  Patient was well-appearing, nontoxic, not in apparent distress.    Plan to evaluate the patient with an x-ray of the right hip, and x-ray of the right knee.    The case was discussed with the ED attending, Dr. Palacio, who was on the room at the patient bedside.  On reassessment, patient has no immediate complaint requiring medical intervention.      At 0633, x-ray of the knee shows no acute finding, x-ray of the right hip shows no acute finding.  Patient was updated with the imaging result and was reassured of the normal finding.  He was determined to benefit from outpatient treatment with steroid and follow-up with his primary care physician regarding his ED visit.  Patient was amenable with the plan.  He was discharged in stable condition with strict return precautions.    Patient was given a 5-day prescription of 50 mg prednisone to continue treatment at home.  ----      Differential diagnoses considered include but are not limited to: Stress fracture of the right hip, or knee    Social Determinants of Health which Significantly Impact Care: None identified     EKG Independent Interpretation: EKG not obtained    Independent Result Review and Interpretation: Relevant laboratory and radiographic results were reviewed and independently interpreted by myself.  As necessary, they are commented on in the ED Course.    Chronic conditions affecting the patient's care: As documented above in [facility]    The patient was discussed with the following consultants/services: None    Care Considerations: As documented above in [facility]    ED Course:  Diagnoses as of 04/02/25 0648   Weakness of left lower extremity     Disposition   As a result of the work-up, the patient was  discharged home.  he was informed of his diagnosis and instructed to come back with any concerns or worsening of condition.  he and was agreeable to the plan as discussed above.  he was given the opportunity to ask questions.  All of the patient's questions were answered.    Procedures   Procedures    This was a shared visit with an ED attending.  The patient was seen and discussed with the ED attending Dr. Palacio.    Elena Easton MD  Emergency Medicine, PGY-2     Elena Easton MD  Resident  04/06/25 0659       Elena Easton MD  Resident  04/06/25 [id number]

## 2025-04-02 NOTE — Clinical Note
Toribio Jesikaaplmirade was seen and treated in our emergency department on 4/2/2025.  He may return to work on 04/07/2025.       If you have any questions or concerns, please don't hesitate to call.      Millie Bose RN

## 2025-04-03 ENCOUNTER — OFFICE VISIT (OUTPATIENT)
Dept: ORTHOPEDIC SURGERY | Facility: CLINIC | Age: 60
End: 2025-04-03
Payer: COMMERCIAL

## 2025-04-03 DIAGNOSIS — M16.11 PRIMARY OSTEOARTHRITIS OF RIGHT HIP: ICD-10-CM

## 2025-04-03 DIAGNOSIS — M17.11 PRIMARY OSTEOARTHRITIS OF RIGHT KNEE: ICD-10-CM

## 2025-04-03 DIAGNOSIS — M47.26 OSTEOARTHRITIS OF SPINE WITH RADICULOPATHY, LUMBAR REGION: Primary | ICD-10-CM

## 2025-04-03 PROCEDURE — 99213 OFFICE O/P EST LOW 20 MIN: CPT | Performed by: ORTHOPAEDIC SURGERY

## 2025-04-03 PROCEDURE — 99204 OFFICE O/P NEW MOD 45 MIN: CPT | Performed by: ORTHOPAEDIC SURGERY

## 2025-04-03 RX ORDER — METHYLPREDNISOLONE 4 MG/1
TABLET ORAL
Qty: 21 TABLET | Refills: 0 | Status: SHIPPED | OUTPATIENT
Start: 2025-04-03

## 2025-04-03 NOTE — PROGRESS NOTES
History of Present Illness   He is here today for evaluation of right hip pain.  He states 1 to 2 days ago he was walking through [employer] and his left knee buckled on him.  He did not fall and caught himself.  He ended up going to the local emergency room where x-rays were taken that showed some underlying knee and hip arthritis but no fracture or soft tissue abnormality.  He does have severe lumbar spine degenerative arthritis.  He is using a cane for ambulation.  He was given prednisone in the emergency room.  He has a history of rheumatoid arthritis and is on Plaquenil.  He is noting progressive weakness in his right lower extremity     Review of Systems   GENERAL: Negative  GI: Negative  MUSCULOSKELETAL: See HPI  SKIN: Negative  NEURO:  Negative     Physical Exam  Right lower extremity  Skin is intact without any evidence of erythema ecchymosis or effusion  No tenderness to palpation over bony landmarks.  He has 3/5 strength with right hip flexion  2+/5 strength with right knee extension  Hip range of motion: 120 degrees hip flexion, 10 degrees internal rotation, 30 degrees external rotation.  Abduction was deferred.  He has no pain with motion of the right hip.  His knee is stable in varus valgus and anterior posterior stresses  Positive straight leg raise  He has nerve damage to his right foot from a lawnmower accident when he was a child and does not have any sensation into his right foot.  His sensation begins around the right ankle     Imaging  X-rays from [facility] on 4/2/2025 of the knee and hip show moderate underlying osteoarthritis without any acute fracture or soft tissue abnormality he has severe degenerative changes in his lower lumbar spine     Assessment   Severe lumbar spine osteoarthritis with radiculopathy   Right hip osteoarthritis, asymptomatic  Right knee osteoarthritis, asymptomatic     Plan  He is already on a prednisone pack from the emergency room however it is low dose, I  advised him to stop this and we will prescribe a medrol dose pack.  Physical therapy  Stat lumbar spine MRI  Referral to spine team  Off work for 2 weeks   All questions answered     In a face to face encounter, I evaluated the patient and performed a physical examination, discussed pertinent diagnostic studies if indicated and discussed diagnosis and management strategies with both the patient and physician assistant / nurse practitioner.  I reviewed the PA/NP's note and agree with the documented findings and plan of care.      Paco Bailey M.D.

## 2025-04-03 NOTE — LETTER
April 3, 2025     Patient: Toribio Villatoro   YOB: 1965   Date of Visit: 4/3/2025       To Whom It May Concern:    It is my medical opinion that Toribio Villatoro should remain out of work until 4- .    If you have any questions or concerns, please don't hesitate to call.         Sincerely,        Paco Bailey MD

## 2025-04-04 ENCOUNTER — HOSPITAL ENCOUNTER (OUTPATIENT)
Dept: RADIOLOGY | Facility: HOSPITAL | Age: 60
Discharge: HOME | End: 2025-04-04
Payer: COMMERCIAL

## 2025-04-04 DIAGNOSIS — M17.11 PRIMARY OSTEOARTHRITIS OF RIGHT KNEE: ICD-10-CM

## 2025-04-04 DIAGNOSIS — M47.26 OSTEOARTHRITIS OF SPINE WITH RADICULOPATHY, LUMBAR REGION: ICD-10-CM

## 2025-04-04 DIAGNOSIS — M16.11 PRIMARY OSTEOARTHRITIS OF RIGHT HIP: ICD-10-CM

## 2025-04-04 PROCEDURE — 72148 MRI LUMBAR SPINE W/O DYE: CPT

## 2025-04-07 ENCOUNTER — OFFICE VISIT (OUTPATIENT)
Dept: ORTHOPEDIC SURGERY | Facility: CLINIC | Age: 60
End: 2025-04-07
Payer: COMMERCIAL

## 2025-04-07 ENCOUNTER — HOSPITAL ENCOUNTER (OUTPATIENT)
Dept: RADIOLOGY | Facility: CLINIC | Age: 60
Discharge: HOME | End: 2025-04-07
Payer: COMMERCIAL

## 2025-04-07 DIAGNOSIS — M54.16 LUMBAR RADICULOPATHY: Primary | ICD-10-CM

## 2025-04-07 DIAGNOSIS — M54.16 LUMBAR RADICULOPATHY: ICD-10-CM

## 2025-04-07 PROCEDURE — 72120 X-RAY BEND ONLY L-S SPINE: CPT

## 2025-04-07 PROCEDURE — 72110 X-RAY EXAM L-2 SPINE 4/>VWS: CPT | Performed by: ORTHOPAEDIC SURGERY

## 2025-04-07 PROCEDURE — 99214 OFFICE O/P EST MOD 30 MIN: CPT | Performed by: PHYSICIAN ASSISTANT

## 2025-04-07 RX ORDER — TRAMADOL HYDROCHLORIDE 50 MG/1
50 TABLET ORAL EVERY 6 HOURS PRN
Qty: 28 TABLET | Refills: 0 | Status: SHIPPED | OUTPATIENT
Start: 2025-04-07 | End: 2025-04-14

## 2025-04-07 RX ORDER — GABAPENTIN 300 MG/1
300 CAPSULE ORAL 2 TIMES DAILY
Qty: 60 CAPSULE | Refills: 11 | Status: SHIPPED | OUTPATIENT
Start: 2025-04-07 | End: 2026-04-07

## 2025-04-07 NOTE — PROGRESS NOTES
Toribio Villatoro is a 59 y.o. male who presents for New Patient Visit of the Lower Back (Ref by Dr. 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    HPI:  59-year-old gentleman here for new patient visit of low back pain.  Referred by Dr. Bailey.  He denies any fever chills nausea vomiting night sweats.  He has no bowel or bladder complaints.    Physical exam:  Well-nourished, well kept.No lymphangitis or lymphadenopathy in the examined extremities.  In an office provided wheelchair today, has a very difficult time rising from a seated position and sitting from a standing position can stand on heels and toes with difficulty.   Examination of the back shows tenderness in the paraspinous musculature.  There is decreased range of motion in all directions due to guarding/muscle spasms and pain at extremes.  There is good strength and no instability.  Examination of the lower extremities reveals no point tenderness, swelling, or deformity.  Range of motion of the hips, knees, and ankles are full without crepitance, instability, or exacerbation of pain.  Strength is 5/5 throughout, knee flexion extension on the right is about 4/5, hip flexion on the right is about 4/5..  No redness, abrasions, or lesions on extremities  Gross sensation intact in the extremities.  Affect normal.  Alert and oriented ×3.  Coordination normal.    Imaging studies:  AP lateral flexion-extension plain films of the lumbar spine were ordered and reviewed today.  An MRI of the lumbar spine from April 4, 2025 was reviewed today.    Assessment:  Patient with a week or so onset of right buttock and low back pain and right radiating pain through the right buttock down the hamstring stopping at the knee.  This is causing him to fall.  He went to the emergency department on April 2, 2025.  He drives a truck and he is climbing in and out of  the cab and moving product around all day.  He went to the emergency department and was worked up with low back pain and possibly right knee problems.  He came and saw Dr. Bailey's group and was sent to the spine team.  An MRI was obtained.  He does have a small spondylolisthesis of L3 on 4, he has a disc extrusion paracentral to the right at L3-4 with some cranial migration.  The MRI report was reviewed the MRI was reviewed it looks like he may have disc material abutting the descending right L4 nerve root.  The patient has a prior right foot injury from a lawnmower when he was 16, he has numbness in the foot and ankle that is chronic and permanent.  I get weakness with his right hip flexion and I get weakness with his right knee flexion and extension.    Plan:  He has weakness in his hip flexor and knee flexion extension on the right.  Some of this may be a pain guarding response.  He has that small slippage L3-4 and would most likely need a TLIF at L3-4 coming in through the right with a globus retractor due to the iatrogenic instability that may be caused by a pre-existing spondylolisthesis and surgery.  He is not sure if he wants to try surgery or if he wants to do some conservative therapy first.  He wants to go home and discuss this with his wife.    We have ordered and reviewed tests, x-rays, MRI.  I reviewed the note from Yash lopez with Dr. Bailey's group from April 3, 2025.  I reviewed the emergency department note from April 2, 2025.  He is here with his wife today who is a helpful and necessary historian.  This is an undiagnosed new problem with severe inhibition of bodily function.    Marcel Martinez PA-C

## 2025-04-08 ENCOUNTER — APPOINTMENT (OUTPATIENT)
Dept: PRIMARY CARE | Facility: CLINIC | Age: 60
End: 2025-04-08
Payer: COMMERCIAL

## 2025-04-08 VITALS
RESPIRATION RATE: 16 BRPM | TEMPERATURE: 96.8 F | DIASTOLIC BLOOD PRESSURE: 66 MMHG | HEART RATE: 92 BPM | HEIGHT: 70 IN | OXYGEN SATURATION: 92 % | BODY MASS INDEX: 35.16 KG/M2 | SYSTOLIC BLOOD PRESSURE: 112 MMHG | WEIGHT: 245.6 LBS

## 2025-04-08 DIAGNOSIS — M05.79 RHEUMATOID ARTHRITIS INVOLVING MULTIPLE SITES WITH POSITIVE RHEUMATOID FACTOR (MULTI): ICD-10-CM

## 2025-04-08 DIAGNOSIS — E11.21 DIABETIC NEPHROPATHY ASSOCIATED WITH TYPE 2 DIABETES MELLITUS: ICD-10-CM

## 2025-04-08 DIAGNOSIS — I10 HTN (HYPERTENSION), BENIGN: ICD-10-CM

## 2025-04-08 DIAGNOSIS — E11.9 TYPE 2 DIABETES MELLITUS WITHOUT COMPLICATION, WITHOUT LONG-TERM CURRENT USE OF INSULIN: ICD-10-CM

## 2025-04-08 DIAGNOSIS — E78.2 HYPERLIPEMIA, MIXED: ICD-10-CM

## 2025-04-08 DIAGNOSIS — N18.31 TYPE 2 DIABETES MELLITUS WITH STAGE 3A CHRONIC KIDNEY DISEASE, WITHOUT LONG-TERM CURRENT USE OF INSULIN (MULTI): Primary | ICD-10-CM

## 2025-04-08 DIAGNOSIS — E11.22 TYPE 2 DIABETES MELLITUS WITH STAGE 3A CHRONIC KIDNEY DISEASE, WITHOUT LONG-TERM CURRENT USE OF INSULIN (MULTI): Primary | ICD-10-CM

## 2025-04-08 DIAGNOSIS — M54.16 LUMBAR RADICULOPATHY: ICD-10-CM

## 2025-04-08 PROCEDURE — 1036F TOBACCO NON-USER: CPT | Performed by: FAMILY MEDICINE

## 2025-04-08 PROCEDURE — 3008F BODY MASS INDEX DOCD: CPT | Performed by: FAMILY MEDICINE

## 2025-04-08 PROCEDURE — 99214 OFFICE O/P EST MOD 30 MIN: CPT | Performed by: FAMILY MEDICINE

## 2025-04-08 PROCEDURE — 3074F SYST BP LT 130 MM HG: CPT | Performed by: FAMILY MEDICINE

## 2025-04-08 PROCEDURE — 3078F DIAST BP <80 MM HG: CPT | Performed by: FAMILY MEDICINE

## 2025-04-08 ASSESSMENT — PATIENT HEALTH QUESTIONNAIRE - PHQ9
1. LITTLE INTEREST OR PLEASURE IN DOING THINGS: NOT AT ALL
2. FEELING DOWN, DEPRESSED OR HOPELESS: NOT AT ALL
SUM OF ALL RESPONSES TO PHQ9 QUESTIONS 1 AND 2: 0

## 2025-04-08 ASSESSMENT — ENCOUNTER SYMPTOMS
LOSS OF SENSATION IN FEET: 0
DEPRESSION: 0
OCCASIONAL FEELINGS OF UNSTEADINESS: 0

## 2025-04-08 NOTE — PROGRESS NOTES
"Subjective   Patient ID:  Toribio Villatoro is a 59 y.o. male patient who presents today for Diabetes, Hypertension, Hypothyroidism, Hyperlipidemia, Hypogonadism, Nephropathy, Hypoglycemia, and Sleep Apnea (Pt wants to know if he can ever stop using his cpap, Snoring has decreased and breathing has improved).    Diabetes: Most recent blood work shows blood glucose 62 mg/dl and HbA1C 6.7%. eGFR 45.     Hypertension: Blood pressure is normal today.     Hypothyroidism: Patient currently on levothyroxine.     Hyperlipidemia: Triglycerides are elevated. Otherwise, cholesterol levels are well controlled.     Sleep apnea: Patient reports that snoring has decreased and breathing has improved with CPAP use. He inquires about whether he could ever stop using the CPAP machine.     Right knee gave out, no fall.  Worse on WED then ER then  Thurs diagnosed Lumbar issues, has herniated disc lumbar spine. Will see Dr. Mcrae for possible surgical intervention.     Objective   Vitals:  /66   Pulse 92   Temp 36 °C (96.8 °F)   Resp 16   Ht 1.778 m (5' 10\")   Wt 111 kg (245 lb 9.6 oz)   SpO2 92%   BMI 35.24 kg/m²       Physical Exam  Vitals reviewed.   Constitutional:       Appearance: Normal appearance.   Neck:      Vascular: No carotid bruit.   Cardiovascular:      Rate and Rhythm: Normal rate and regular rhythm.      Pulses: Normal pulses.      Heart sounds: Normal heart sounds.   Pulmonary:      Effort: Pulmonary effort is normal. No respiratory distress.      Breath sounds: Normal breath sounds. No wheezing.   Abdominal:      General: There is no distension.      Palpations: Abdomen is soft. There is no mass.      Tenderness: There is no abdominal tenderness. There is no right CVA tenderness, left CVA tenderness, guarding or rebound.   Musculoskeletal:      Cervical back: Normal range of motion and neck supple. No rigidity.      Right lower leg: No edema.      Left lower leg: No edema.      Comments: Right knee " immobilized   Lymphadenopathy:      Cervical: No cervical adenopathy.   Neurological:      Mental Status: He is alert.         Labs reviewed from:   4/1/25    CMP, CBC, Lipid, HgA1C 6.7%       Assessment/Plan   Problem List Items Addressed This Visit       HTN (hypertension), benign - Primary    Overview                Current Assessment & Plan     This condition  is well controlled, continue with current medications.           Relevant Orders    CBC and Auto Differential    Comprehensive Metabolic Panel    Hyperlipemia, mixed    Current Assessment & Plan     This condition is stable, continue with current treatment.           Relevant Orders    Lipid Panel    Rheumatoid arthritis    Current Assessment & Plan     This condition is stable, continue with current treatment.         Type 2 diabetes mellitus with stage 3a chronic kidney disease, without long-term current use of insulin (Multi)    Current Assessment & Plan     This condition is stable, continue with current treatment.           Lumbar radiculopathy    Diabetic nephropathy (Multi)     Lumbar radiculopathy, refer for neuro spinal opinion and diabetic nephropathy, stable with diabetes.      Follow up in: 4 month(s) for follow-up of the above issues or sooner if needed with labs prior.       Scribe Attestation  By signing my name below, IJasmin , Scribe attest that this documentation has been prepared under the direction and in the presence of Gunner Quiroz MD.  IGunner MD, personally performed the services described in the documentation as scribed by Jasmin Krishnan in my presence and confirm that it is both accurate and complete.

## 2025-04-08 NOTE — H&P (VIEW-ONLY)
"Subjective   Patient ID:  Toribio Villatoro is a 59 y.o. male patient who presents today for Diabetes, Hypertension, Hypothyroidism, Hyperlipidemia, Hypogonadism, Nephropathy, Hypoglycemia, and Sleep Apnea (Pt wants to know if he can ever stop using his cpap, Snoring has decreased and breathing has improved).    Diabetes: Most recent blood work shows blood glucose 62 mg/dl and HbA1C 6.7%. eGFR 45.     Hypertension: Blood pressure is normal today.     Hypothyroidism: Patient currently on levothyroxine.     Hyperlipidemia: Triglycerides are elevated. Otherwise, cholesterol levels are well controlled.     Sleep apnea: Patient reports that snoring has decreased and breathing has improved with CPAP use. He inquires about whether he could ever stop using the CPAP machine.     Right knee gave out, no fall.  Worse on WED then ER then  Thurs diagnosed Lumbar issues, has herniated disc lumbar spine. Will see Dr. Mcrae for possible surgical intervention.  This patient is medically cleared for any possible surgery Dr. Lange would want to do.  He is not on diabetic medicines that need to be stopped prior to the procedure.    Objective   Vitals:  /66   Pulse 92   Temp 36 °C (96.8 °F)   Resp 16   Ht 1.778 m (5' 10\")   Wt 111 kg (245 lb 9.6 oz)   SpO2 92%   BMI 35.24 kg/m²       Physical Exam  Vitals reviewed.   Constitutional:       Appearance: Normal appearance.   Neck:      Vascular: No carotid bruit.   Cardiovascular:      Rate and Rhythm: Normal rate and regular rhythm.      Pulses: Normal pulses.      Heart sounds: Normal heart sounds.   Pulmonary:      Effort: Pulmonary effort is normal. No respiratory distress.      Breath sounds: Normal breath sounds. No wheezing.   Abdominal:      General: There is no distension.      Palpations: Abdomen is soft. There is no mass.      Tenderness: There is no abdominal tenderness. There is no right CVA tenderness, left CVA tenderness, guarding or rebound. "   Musculoskeletal:      Cervical back: Normal range of motion and neck supple. No rigidity.      Right lower leg: No edema.      Left lower leg: No edema.      Comments: Right knee immobilized   Lymphadenopathy:      Cervical: No cervical adenopathy.   Neurological:      Mental Status: He is alert.         Labs reviewed from:   4/1/25    CMP, CBC, Lipid, HgA1C 6.7%       Assessment/Plan   Problem List Items Addressed This Visit       HTN (hypertension), benign    Overview              Current Assessment & Plan   This condition  is well controlled, continue with current medications.           Relevant Orders    CBC and Auto Differential    Comprehensive Metabolic Panel    Hyperlipemia, mixed    Current Assessment & Plan   This condition is stable, continue with current treatment.           Relevant Orders    Lipid Panel    Rheumatoid arthritis    Current Assessment & Plan   This condition is stable, continue with current treatment.         Type 2 diabetes mellitus with stage 3a chronic kidney disease, without long-term current use of insulin (Multi) - Primary    Current Assessment & Plan   This condition is stable, continue with current treatment.           Relevant Orders    Hemoglobin A1C    Follow Up In [facility] - PCP - Established    Lumbar radiculopathy    Current Assessment & Plan   Symptomatic, patient cannot lift the leg, follow with spinal specialist. Cleared for any proposed surgery, no need to stop his diabetic meds prior.          Diabetic nephropathy (Multi)     Lumbar radiculopathy, refer for neuro spinal opinion and diabetic nephropathy, stable with diabetes.      Follow up in: 4 month(s) for follow-up of the above issues or sooner if needed with labs prior.       Scribe Attestation  By signing my name below, Jasmin PRESCOTT , Scribe attest that this documentation has been prepared under the direction and in the presence of Gunner Quiroz MD.  Gunner PRESCOTT MD, personally  performed the services described in the documentation as scribed by Jasmin Krishnan in my presence and confirm that it is both accurate and complete.

## 2025-04-11 ENCOUNTER — OFFICE VISIT (OUTPATIENT)
Dept: ORTHOPEDIC SURGERY | Facility: CLINIC | Age: 60
End: 2025-04-11
Payer: COMMERCIAL

## 2025-04-11 ENCOUNTER — PATIENT MESSAGE (OUTPATIENT)
Dept: CARDIOLOGY | Facility: CLINIC | Age: 60
End: 2025-04-11

## 2025-04-11 DIAGNOSIS — M17.11 OSTEOARTHRITIS OF RIGHT KNEE, UNSPECIFIED OSTEOARTHRITIS TYPE: ICD-10-CM

## 2025-04-11 DIAGNOSIS — M54.16 LUMBAR RADICULOPATHY: ICD-10-CM

## 2025-04-11 PROCEDURE — 99215 OFFICE O/P EST HI 40 MIN: CPT | Performed by: ORTHOPAEDIC SURGERY

## 2025-04-11 NOTE — PROGRESS NOTES
Chief complaint: Right leg weakness    HPI: This patient has the acute onset of weakness of his right leg about 1011 days ago.  It is getting progressively worse.  He has difficulty with hip flexion and knee extension on the right side.  His ankle has good strength.  He has no left-sided weakness.  He has some thigh and leg pain on the right side as well.  There is no fevers chills nausea vomiting.  He is to the point where he is wearing a knee immobilizer to help control his knee as he has no power to keep his knee from buckling.  He has no fevers chills nausea vomiting.  No history of spine surgery.  No bowel or bladder changes.  No left-sided symptoms.  No history of anything like this in the past.  No traumatic events.    Physical exam: Well-nourished, well kept.  Good perfusion to the extremities ×4.  Radial and dorsalis pedis pulses 2+.  Capillary refill to all 4 digits brisk.  No distal edema x 4.  No lymphangitis or lymphadenopathy in the examined extremities.  Patient does not walk normally and has a significant limp due to the need for a knee brace on the right side.  Thoracic spine is nontender.  Examination of the back shows tenderness in the paraspinous musculature.  There is decreased range of motion in all directions due to guarding/muscle spasms and pain at extremes.  There is good strength and no instability.  Examination of the lower extremities reveals no point tenderness, swelling, or deformity.  Range of motion of the hips, knees, and ankles are full without crepitance, instability, or exacerbation of pain.  Strength is 5/5 throughout except significant weakness on the left with 3/5 strength of hip flexion and knee extension.  Ankle flexion and dorsiflexion strength appear to be intact on the right.  No redness, abrasions, or lesions on all 4 extremities, head and neck, or trunk.  Gross sensation intact in the extremities ×4.  Deep tendon reflexes blunted bilaterally.  Nelia and clonus were  negative.  Straight leg raise negative.  Affect normal.  Alert and oriented ×3.  Coordination normal.    X-rays show a spondylolisthesis at L3-4.  MRI was reviewed and shows a disc herniation on the right which heads up in the cephalad direction and contacts the L3 nerve root in the foramen.  There may be some contact with the L4 nerve root as well but this appears to be more of an L3 nerve root issue.    Assessment/plan: Patient with above described imaging studies.  I had a long discussion with the patient and explained to them that their options are 1) live with the symptoms and see how they evolve, 2) physical therapy, 3) pain management or 4) surgery.  He cannot live like this anymore.  He severely inhibited bodily function.  He wants to proceed with surgery.  I think it is a reasonable option.  He has got profound weakness.  We would like to take him sooner than later but he has multiple medical issues that need to be addressed first.  He has rheumatoid arthritis and we need to have him get clearance from his rheumatologist to see what medicines he needs to stop before surgery.  He is on Eliquis and we need to have him see cardiology to make sure we can figure out when we can stop his Eliquis.  He also has diabetes and he is not sure if he is on a GLP inhibitor and we need him to see his primary care doctor to make sure he is off all necessary diabetes medicines to proceed with surgery as well.  Once all that is done we will book him for surgery.  Unfortunately I am gone next week.  I did offer for him to be treated by one of my colleagues and be admitted to the hospital to have this done more urgently but he wants to wait for me.    The patient understands that surgery is elective.  They understand that surgery comes with more risks than not doing surgery.  They understand they do not have to do surgery.  However, they wish to proceed with surgery.  All of the risks, benefits, and potential complications for  operative and nonoperative treatment were discussed at length with the patient.  Risks of operative intervention include, but are not limited to: 1) anesthesia complications, 2) extensive blood loss, 3) infection, 4) damage to uninjured structures including, but not limited to: The dural sac and nerve roots, 5) blood clots, and 6) lack of improvement or worsening of symptoms.  These complications could result in death, permanent disability, or need for reoperation.  Upon leaving the office today the patient completely understood these risks and wishes to proceed with operative intervention.    We will need to go in and take the facet down on the right at L3-4 where he already has a spondylolisthesis.  That will exacerbate his pre-existing instability and will require a TLIF at that level.  Will get that herniated disc out that had cephalad from L3-4 and getting the L3 and probably the L4 nerve roots.

## 2025-04-11 NOTE — LETTER
April 11, 2025     Patient: Toribio Villatoro   YOB: 1965   Date of Visit: 4/11/2025       To Whom It May Concern:    It is my medical opinion that Toribio Villatoro  is to be off work through an estimated 07/13/2025  .    If you have any questions or concerns, please don't hesitate to call.         Sincerely,        Raleigh Lange MD    CC: No Recipients

## 2025-04-12 PROBLEM — M43.16 SPONDYLOLISTHESIS, LUMBAR REGION: Status: ACTIVE | Noted: 2025-04-11

## 2025-04-12 PROBLEM — M51.26 OTHER INTERVERTEBRAL DISC DISPLACEMENT, LUMBAR REGION: Status: ACTIVE | Noted: 2025-04-11

## 2025-04-13 ENCOUNTER — PATIENT MESSAGE (OUTPATIENT)
Dept: PRIMARY CARE | Facility: CLINIC | Age: 60
End: 2025-04-13
Payer: COMMERCIAL

## 2025-04-13 DIAGNOSIS — N32.81 OVERACTIVE BLADDER: ICD-10-CM

## 2025-04-13 DIAGNOSIS — K21.9 GASTROESOPHAGEAL REFLUX DISEASE WITHOUT ESOPHAGITIS: ICD-10-CM

## 2025-04-14 ENCOUNTER — TELEPHONE (OUTPATIENT)
Dept: ORTHOPEDIC SURGERY | Facility: CLINIC | Age: 60
End: 2025-04-14
Payer: COMMERCIAL

## 2025-04-14 ENCOUNTER — TELEPHONE (OUTPATIENT)
Dept: CARDIOLOGY | Facility: CLINIC | Age: 60
End: 2025-04-14
Payer: COMMERCIAL

## 2025-04-14 DIAGNOSIS — I10 HTN (HYPERTENSION), BENIGN: ICD-10-CM

## 2025-04-14 DIAGNOSIS — I51.7 LVH (LEFT VENTRICULAR HYPERTROPHY): ICD-10-CM

## 2025-04-14 RX ORDER — GLYCOPYRROLATE 1 MG/1
2 TABLET ORAL 2 TIMES DAILY
Qty: 360 TABLET | Refills: 3 | Status: SHIPPED | OUTPATIENT
Start: 2025-04-14

## 2025-04-14 RX ORDER — OXYBUTYNIN CHLORIDE 10 MG/1
10 TABLET, EXTENDED RELEASE ORAL DAILY
Qty: 90 TABLET | Refills: 3 | Status: SHIPPED | OUTPATIENT
Start: 2025-04-14

## 2025-04-14 RX ORDER — CARVEDILOL 25 MG/1
37.5 TABLET ORAL
Qty: 270 TABLET | Refills: 1 | Status: SHIPPED | OUTPATIENT
Start: 2025-04-14

## 2025-04-14 ASSESSMENT — DUKE ACTIVITY SCORE INDEX (DASI)
CAN YOU DO MODERATE WORK AROUND THE HOUSE LIKE VACUUMING, SWEEPING FLOORS OR CARRYING GROCERIES: YES
DASI METS SCORE: 9.9
CAN YOU DO HEAVY WORK AROUND THE HOUSE LIKE SCRUBBING FLOORS OR LIFTING AND MOVING HEAVY FURNITURE: YES
CAN YOU RUN A SHORT DISTANCE: YES
CAN YOU WALK A BLOCK OR TWO ON LEVEL GROUND: YES
TOTAL_SCORE: 58.2
CAN YOU DO LIGHT WORK AROUND THE HOUSE LIKE DUSTING OR WASHING DISHES: YES
CAN YOU PARTICIPATE IN STRENOUS SPORTS LIKE SWIMMING, SINGLES TENNIS, FOOTBALL, BASKETBALL, OR SKIING: YES
CAN YOU DO YARD WORK LIKE RAKING LEAVES, WEEDING OR PUSHING A MOWER: YES
CAN YOU CLIMB A FLIGHT OF STAIRS OR WALK UP A HILL: YES
CAN YOU WALK INDOORS, SUCH AS AROUND YOUR HOUSE: YES
CAN YOU PARTICIPATE IN MODERATE RECREATIONAL ACTIVITIES LIKE GOLF, BOWLING, DANCING, DOUBLES TENNIS OR THROWING A BASEBALL OR FOOTBALL: YES
CAN YOU TAKE CARE OF YOURSELF (EAT, DRESS, BATHE, OR USE TOILET): YES
CAN YOU HAVE SEXUAL RELATIONS: YES

## 2025-04-14 ASSESSMENT — LIFESTYLE VARIABLES: SMOKING_STATUS: NONSMOKER

## 2025-04-14 ASSESSMENT — ACTIVITIES OF DAILY LIVING (ADL): ADL_SCORE: 1

## 2025-04-14 NOTE — TELEPHONE ENCOUNTER
Pt states lesion fluctuating in size on Left tongue.  No recent bleeding.  Pt seeing Dr Del Rio for liver w/u soon.  Vitals reviewed.   All questions answered.  TIGRE Bolton MD         Received preop clearance form for patient pending spinal procedure with Dr. Nazario MD on 4/24/25.     Patient last seen with Dr. Ly DO on 10/9/2024:  Recommendations  Patient appears to be stable from a cardiac standpoint. He remains compensated from a heart failure standpoint. Symptoms of angina. No symptomatic arrhythmia. Advise diet and weight loss.  Patient is very active at work.  Routine lab work through PCP. Follow-up with myself in 1 year. Refer to dietitian for diabetes and hypertriglyceridemia.    Last [facility] completed 12/30/2021:  1. The entire Left Main: 0% stenosis.   2. Entire LAD Lesion: The percent stenosis is 0%.   3. Entire CX Lesion: The percent stenosis is 0%.   4. The entire RCA Lesion: The percent stenosis is 0%.   5. The Left Ventricular Ejection Fraction is 15%.   6. Mitral Regurgitation: Mild.    Patient is not on any oral anticoagulants at this time.   Form placed for Dr. Ly DO to consider today in clinic.

## 2025-04-14 NOTE — CPM/PAT H&P
CPM/PAT Evaluation       Name: Toribio Villatoro (Toribio Villatoro)  /Age: 1965/59 y.o.     In-Person       Chief Complaint: Right buttocks down right leg pain     HPI  Pleasant 60 y/o male presents with other interverebral disc displacement, lumbar region and spondylolisthesis, lumbar region scheduled for L3-4 right microdiscectomy, instrumentation, posterior interbody fusion, posterior lateral fusion on 25. States the pain radiates down his buttocks to right leg. He had an acute onset of weakness of his right leg that has been worsening. Endorses tightness of his right leg and instability of his knee. Has been wearing a brace for stability. Denies other associated symptoms. Denies recent fever/illness/chills.     Past Medical History:   Diagnosis Date    Allergic rhinitis due to pollen 2019    Seasonal allergic rhinitis due to pollen    Cardiomegaly 10/12/2022    LVH (left ventricular hypertrophy)    CHF (congestive heart failure)     Chronic kidney disease     Stage 3    Diabetes mellitus (Multi)     type 2    Diabetic nephropathy (Multi)     Dilated idiopathic cardiomyopathy (Multi) 2023    Disorder of white blood cells, unspecified 2021    Neutrophilic leukocytosis    Generalized hyperhidrosis     Hyperhidrosis    GERD (gastroesophageal reflux disease)     Hyperlipidemia     Hypertension     Hypothyroidism     Lumbar disc disease     Myocardial infarction (Multi)     NSTEMI    Myocarditis 2023    NICM (nonischemic cardiomyopathy) (Multi) 2023    Other cardiomyopathies 10/12/2022    NICM (nonischemic cardiomyopathy)    Other hammer toe(s) (acquired), right foot 2022    Acquired hammertoe of right foot    Personal history of other benign neoplasm 2020    History of other benign neoplasm    Personal history of other diseases of the circulatory system 2022    History of myocarditis    Personal history of other specified conditions 2021    History of  "excessive sweating    Rheumatoid arthritis     Sleep apnea     Per pt uses a cpap machine.    Type 2 diabetes mellitus        Past Surgical History:   Procedure Laterality Date    COLONOSCOPY      OTHER SURGICAL HISTORY Right 02/16/2022    Knee replacement    OTHER SURGICAL HISTORY  02/16/2022    Cardiac catheterization    OTHER SURGICAL HISTORY      Toe amputation on right foot at age 16.    UPPER GASTROINTESTINAL ENDOSCOPY         Patient  reports being sexually active and has had partner(s) who are female. He reports using the following methods of birth control/protection: Post-menopausal, Male Sterilization, and Female Sterilization.    Family History   Problem Relation Name Age of Onset    Heart attack Mother Juanita         acute    Breast cancer Mother Juanita     Mastectomy Mother Juanita     Cancer Father Jl     Heart attack Brother      Heart disease Maternal Grandmother      Parkinsonism Maternal Grandfather      Diabetes Paternal Grandfather      Heart attack Paternal Grandfather         Allergies   Allergen Reactions    Lisinopril Hallucinations     Per pt \"causes suicidal thoughts\".       Prior to Admission medications    Medication Sig Start Date End Date Taking? Authorizing Provider   amLODIPine (Norvasc) 10 mg tablet Take 1 tablet (10 mg) by mouth once daily. 10/9/24 10/9/25  Jordan Modi DO   atorvastatin (Lipitor) 10 mg tablet Take 1 tablet (10 mg) by mouth once daily. 10/9/24   Jrodan Modi DO   baclofen (Lioresal) 10 mg tablet TAKE 1 TABLET BY MOUTH THREE TIMES A DAY AS NEEDED FOR MUSCLE SPASM 10/17/24   Gunner Quiroz MD   blood sugar diagnostic (Contour Next Test Strips) strip 1 strip in the morning and 1 strip in the evening. 10/1/24   Gunner Quiroz MD   carvedilol (Coreg) 25 mg tablet Take 1.5 tablets (37.5 mg) by mouth 2 times daily (morning and late afternoon). 3/31/25   Jordan Modi DO   DULoxetine (Cymbalta) 60 mg DR capsule Take 1 capsule (60 mg) by mouth 2 times a day. " 12/2/24 12/2/25  Gunner Quiroz MD   empagliflozin-linagliptin (Glyxambi) 25-5 mg Take 1 tablet by mouth once daily. 2/26/25 2/26/26  Gunner Quiroz MD   ergocalciferol, vitamin D2, (VITAMIN D2 ORAL) Take 1 tablet by mouth once daily.    Historical Provider, MD   fenofibrate (Triglide) 160 mg tablet Take 1 tablet (160 mg) by mouth once daily.  Patient taking differently: Take 1 tablet (160 mg) by mouth once daily. Per pt takes in the evening. 10/9/24 10/9/25  Jordan Modi DO   FreeStyle glucose monitoring kit 1 each if needed.    Historical Provider, MD   gabapentin (Neurontin) 300 mg capsule Take 1 capsule (300 mg) by mouth 2 times a day. 4/7/25 4/7/26  Cole C Budinsky, MD   glycopyrrolate (Robinul) 1 mg tablet Take 2 tablets (2 mg) by mouth 2 times a day. 4/14/25   Gunner Quiroz MD   hydroxychloroquine (Plaquenil) 200 mg tablet Take 2 tablets (400 mg) by mouth once daily. Per pt takes in the evening. 10/16/24   Historical Provider, MD   icosapent ethyL (Vascepa) 1 gram capsule Take 2 capsules (2 g) by mouth 2 times daily (morning and late afternoon). 10/9/24 10/9/25  Jordan Modi DO   Jardiance 10 mg Take 1 tablet (10 mg) by mouth once daily. 11/26/24   Historical Provider, MD   lancets 33 gauge misc 1 Lancet in the morning and at bedtime. 3/3/22   Historical Provider, MD   levothyroxine (Synthroid, Levoxyl) 75 mcg tablet Take 6 of 7 mornings a week on an empty stomach at the same time each day, either 30 to 60 minutes prior to breakfast  Patient taking differently: Take 6 of 7 mornings a week on an empty stomach at the same time each day, either 30 to 60 minutes prior to breakfast; per pt takes Monday thru Saturday and skips Sunday. 12/3/24   Gunner Quiroz MD   metFORMIN (Glucophage) 500 mg tablet TAKE 1 TABLET BY MOUTH TWICE  DAILY 9/3/24   Gunner Quiroz MD   methylPREDNISolone (Medrol Dospak) 4 mg tablets Follow schedule on package instructions  Patient not taking: Reported on 4/14/2025  "4/3/25   Yash Cuellar PA-C   multivit-min/iron/folic acid/K (MULTI-DAY PLUS MINERALS ORAL) Take 1 tablet by mouth once daily.    Historical Provider, MD   oxybutynin XL (Ditropan-XL) 10 mg 24 hr tablet Take 1 tablet (10 mg) by mouth once daily. 4/14/25   Gunner Quiroz MD   pantoprazole (ProtoNix) 40 mg EC tablet TAKE 1 TABLET BY MOUTH ONCE  DAILY IN THE MORNING TAKE BEFORE MEALS 12/9/24   Gunner Quiroz MD   predniSONE (Deltasone) 50 mg tablet Take 1 tablet (50 mg) by mouth once daily for 5 days. 4/2/25 4/7/25  Elena Easton MD   sacubitriL-valsartan (Entresto)  mg tablet Take 1 tablet by mouth 2 times a day. 10/9/24 10/9/25  Jordan Modi,    spironolactone (Aldactone) 50 mg tablet Take 1 tablet (50 mg) by mouth once daily. 10/9/24 10/9/25  Jordan Modi,    syringe with needle (BD Luer-Kate Syringe) 3 mL 18 x 1 1/2\" syringe 3 mL every 14 (fourteen) days. Use as directed 3/6/24   Gunner Quiroz MD   syringe with needle (BD Luer-Kate Syringe) 3 mL 23 x 1\" syringe USE 1 EVERY 14 AS DIRECTED EVERY OTHER WEEK FOR INJECTIONS 2/26/25   Gunner Quiroz MD   syringe, disposable, (BD Luer-Kate Syringe) 3 mL syringe 1 each every 14 (fourteen) days. 22G x 1' 3ML, Use as directed every other week for injections 3/6/24   Gunner Quiroz MD   testosterone cypionate (Depo-Testosterone) 200 mg/mL injection 400 mg intramuscularly every 3 weeks, single dose vials.  Patient taking differently: 400 mg intramuscularly every 3 weeks, single dose vials. **Last dose 4/10/25** 3/20/25   Rome Bennett MD   traMADol (Ultram) 50 mg tablet Take 1 tablet (50 mg) by mouth every 6 hours if needed for severe pain (7 - 10) for up to 7 days. 4/7/25 4/14/25  Cole C Budinsky, MD   upadacitinib ER (Rinvoq) 15 mg tablet extended release 24 hr Take 1 tablet (15 mg) by mouth once daily. 8/9/21   Historical Provider, MD   glycopyrrolate (Robinul) 1 mg tablet Take 2 tablets (2 mg) by mouth 2 times a day. 4/2/24 4/14/25  " Gunner Quiroz MD   oxybutynin XL (Ditropan-XL) 10 mg 24 hr tablet TAKE 1 TABLET BY MOUTH ONCE  DAILY 7/2/24 4/14/25  Gunner Quiroz MD        Constitutional: Negative for fever, chills, or sweats   ENMT: Negative for nasal discharge, congestion, ear pain, mouth pain, throat pain. Positive for glasses. Positive for bilateral hearing aids.   Respiratory: Negative for cough, wheezing, shortness of breath   Cardiac: Negative for chest pain, dyspnea on exertion, palpitations   Gastrointestinal: Negative for nausea, vomiting, diarrhea, constipation, abdominal pain. Positive for nausea with RA pain.   Genitourinary: Negative for dysuria, flank pain, frequency, hematuria   Musculoskeletal: Negative for decreased ROM, pain, swelling, weakness. Positive for generalized joint pains. See HPI.    Neurological: Negative for dizziness, confusion, headache  Psychiatric: Negative for mood changes   Skin: Negative for itching, rash, ulcer    Hematologic/Lymph: Negative for bruising, easy bleeding  Allergic/Immunologic: Negative itching, sneezing, swelling      Physical Exam  Vitals reviewed.   Constitutional:       Appearance: Normal appearance.   HENT:      Head: Normocephalic.      Mouth/Throat:      Mouth: Mucous membranes are moist.      Pharynx: Oropharynx is clear.   Eyes:      Pupils: Pupils are equal, round, and reactive to light.   Cardiovascular:      Rate and Rhythm: Normal rate and regular rhythm.      Heart sounds: Normal heart sounds.   Pulmonary:      Effort: Pulmonary effort is normal.      Breath sounds: Normal breath sounds.   Abdominal:      General: Bowel sounds are normal.      Palpations: Abdomen is soft.   Musculoskeletal:      Cervical back: Normal range of motion.      Right knee: Decreased range of motion.   Skin:     General: Skin is warm and dry.   Neurological:      General: No focal deficit present.      Mental Status: He is alert and oriented to person, place, and time.   Psychiatric:         Mood  "and Affect: Mood normal.         Behavior: Behavior normal.          PAT AIRWAY:   Airway:     Mallampati::  II    Neck ROM::  Full  normal        Testing/Diagnostic:     Cardiac cath from 2021: CONCLUSIONS:   1. The entire Left Main: 0% stenosis.   2. Entire LAD Lesion: The percent stenosis is 0%.   3. Entire CX Lesion: The percent stenosis is 0%.   4. The entire RCA Lesion: The percent stenosis is 0%.   5. The Left Ventricular Ejection Fraction is 15%.   6. Mitral Regurgitation: Mild.    Echo from 2021: CONCLUSIONS:   1. The left ventricular systolic function is severely decreased with a 15-20% estimated ejection fraction.   2. Spectral Doppler shows a pseudonormal pattern of left ventricular diastolic filling.   3. The left atrium is moderately dilated.   4. There is mitral stenosis is not seen.   5. Aortic valve stenosis is not present.   6. The pulmonary artery is not well visualized.   7. There is global hypokinesis of the left ventricle with minor regional variations.    Cardiac MRI: Cardiac MRI 4/6/22  1.Dilated left ventricle. 39%  2.RVEF 43%  3.Moderate enhancement imaging is normal     Patient Specialist/PCP:   PCP: Dr. Quiroz  Cardiology: Dr. Modi     Visit Vitals  /67   Pulse 62   Temp 36.1 °C (97 °F) (Temporal)   Resp 16   Ht 1.753 m (5' 9\")   Wt 111 kg (244 lb 7.8 oz)   SpO2 95%   BMI 36.10 kg/m²   Smoking Status Never   BSA 2.32 m²       DASI Risk Score      Flowsheet Row Pre-Admission Testing from 4/15/2025 in [facility] Questionnaire Series Submission from 4/13/2025 in [facility] with Generic Provider Donald   Can you take care of yourself (eat, dress, bathe, or use toilet)?  2.75 filed at 04/14/2025 1338 2.75  filed at 04/13/2025 1527   Can you walk indoors, such as around your house? 1.75 filed at 04/14/2025 1338 1.75  filed at 04/13/2025 1527   Can you walk a block or two on level ground?  2.75 filed at 04/14/2025 1338 2.75  filed at 04/13/2025 1527   Can you climb a " flight of stairs or walk up a hill? 5.5 filed at 04/14/2025 1338 5.5  filed at 04/13/2025 1527   Can you run a short distance? 8 filed at 04/14/2025 1338 8  filed at 04/13/2025 1527   Can you do light work around the house like dusting or washing dishes? 2.7 filed at 04/14/2025 1338 2.7  filed at 04/13/2025 1527   Can you do moderate work around the house like vacuuming, sweeping floors or carrying groceries? 3.5 filed at 04/14/2025 1338 3.5  filed at 04/13/2025 1527   Can you do heavy work around the house like scrubbing floors or lifting and moving heavy furniture?  8 filed at 04/14/2025 1338 8  filed at 04/13/2025 1527   Can you do yard work like raking leaves, weeding or pushing a mower? 4.5 filed at 04/14/2025 1338 4.5  filed at 04/13/2025 1527   Can you have sexual relations? 5.25 filed at 04/14/2025 1338 5.25  filed at 04/13/2025 1527   Can you participate in moderate recreational activities like golf, bowling, dancing, doubles tennis or throwing a baseball or football? 6 filed at 04/14/2025 1338 6  filed at 04/13/2025 1527   Can you participate in strenous sports like swimming, singles tennis, football, basketball, or skiing? 7.5 filed at 04/14/2025 1338 7.5  filed at 04/13/2025 1527   DASI SCORE 58.2 filed at 04/14/2025 1338 58.2  filed at 04/13/2025 1527   METS Score (Will be calculated only when all the questions are answered) 9.9 filed at 04/14/2025 1338 9.9  filed at 04/13/2025 1527          [facility] DVT Assessment      Flowsheet Row Pre-Admission Testing from 4/15/2025 in [facility] [place]   DVT Score (IF A SCORE IS NOT CALCULATING, MUST SELECT A BMI TO COMPLETE) 11 filed at 04/14/2025 1336   Medical Factors Acute myocardial infarction filed at 04/14/2025 1336   Surgical Factors Major surgery planned, lasting over 3 hours filed at 04/14/2025 1336   BMI (BMI MUST BE CHOSEN) 31-40 (Obesity) filed at 04/14/2025 1336          Modified Frailty Index      Flowsheet Row Pre-Admission Testing from  4/15/2025 in [facility]   Non-independent functional status (problems with dressing, bathing, personal grooming, or cooking) 0 filed at 04/14/2025 1339   History of diabetes mellitus  0.0909 filed at 04/14/2025 1339   History of COPD 0 filed at 04/14/2025 1339   History of CHF No filed at 04/14/2025 1339   History of MI 0.0909 filed at 04/14/2025 1339   History of Percutaneous Coronary Intervention, Cardiac Surgery, or Angina No filed at 04/14/2025 1339   Hypertension requiring the use of medication  0.0909 filed at 04/14/2025 1339   Peripheral vascular disease 0 filed at 04/14/2025 1339   Impaired sensorium (cognitive impairement or loss, clouding, or delirium) 0 filed at 04/14/2025 1339   TIA or CVA withouy residual deficit 0 filed at 04/14/2025 1339   Cerebrovascular accident with deficit 0 filed at 04/14/2025 1339   Modified Frailty Index Calculator .2727 filed at 04/14/2025 1339          [id number] Stroke Risk Points  Current as of just now        N/A 0 to 9 Points:      Last Change: N/A          The [id number] risk score (Lip GH, et al. 2009. © 2010 [employer]) quantifies the risk of stroke for a patient with atrial fibrillation. For patients without atrial fibrillation or under the age of 18 this score appears as N/A. Higher score values generally indicate higher risk of stroke.        This score is not applicable to this patient. Components are not calculated.          Revised Cardiac Risk Index      Flowsheet Row Pre-Admission Testing from 4/15/2025 in [facility]   High-Risk Surgery (Intraperitoneal, Intrathoracic,Suprainguinal vascular) 0 filed at 04/14/2025 1339   History of ischemic heart disease (History of MI, History of positive exercuse test, Current chest paint considered due to myocardial ischemia, Use of nitrate therapy, ECG with pathological Q Waves) 1 filed at 04/14/2025 1339   History of congestive heart failure (pulmonary edemia,  bilateral rales or S3 gallop, Paroxysmal nocturnal dyspnea, CXR showing pulmonary vascular redistribution) 1 filed at 2025 1339   History of cerebrovascular disease (Prior TIA or stroke) 0 filed at 2025 1339   Pre-operative insulin treatment 0 filed at 2025 1339   Pre-operative creatinine>2 mg/dl 0 filed at 2025 1339   Revised Cardiac Risk Calculator 2 filed at 2025 1339          Apfel Simplified Score      Flowsheet Row Pre-Admission Testing from 4/15/2025 in [facility] [place]   Smoking status 1 filed at 2025 1339   History of motion sickness or PONV  0 filed at 2025 1339   Use of postoperative opioids 1 filed at 2025 1339   Gender - Female 0=No filed at 2025 1339   Apfel Simplified Score Calculator 2 filed at 2025 1339          Risk Analysis Index Results This Encounter         2025  1339             Do you live in a place other than your own home?: 0    When did you begin living in the place you are currently residing?: Three months or greater    Any kidney failure, kidney not working well, or seeing a kidney doctor (nephrologist)? If yes, was this for kidney stones or another problem?: 8 Other (Comment)      Pt has history of CKD 3.    Any history of chronic (long-term) congestive heart failure (CHF)?: 0 No    Any shortness of breath when resting?: 0 No    In the past five years, have you been diagnosed with or treated for cancer?: No    During the last 3 months has it become difficult for you to remember things or organize your thoughts?: 0 No    Have you lost weight of 10 pounds or more in the past 3 months without trying?: 0 No    Do you have any loss of appetitie?: 0 No    Getting Around (Mobility): 1 Needs help from cane, walker, or scooter    Eatin Can plan and prepare own meals    Toiletin Can use toilet without any help    Personal Hygiene (Bathing, Hand Washing, Changing Clothes): 0 Can shower or bathe without any help     BULLOCK Cancer History: Patient does not indicate history of cancer    Total Risk Analysis Index Score Without Cancer: 30    Total Risk Analysis Index Score: 30          Stop Bang Score      Flowsheet Row Pre-Admission Testing from 4/15/2025 in [facility] [place]   Do you snore loudly? 1 filed at 04/14/2025 1337   Do you often feel tired or fatigued after your sleep? 0 filed at 04/14/2025 1337   Has anyone ever observed you stop breathing in your sleep? 1 filed at 04/14/2025 1337   Do you have or are you being treated for high blood pressure? 1 filed at 04/14/2025 1337   Recent BMI (Calculated) 35.2 filed at 04/14/2025 1337   Is BMI greater than 35 kg/m2? 1=Yes filed at 04/14/2025 1337   Age older than 50 years old? 1=Yes filed at 04/14/2025 1337   Is your neck circumference greater than 17 inches (Male) or 16 inches (Female)? 1 filed at 04/14/2025 1337   Gender - Male 1=Yes filed at 04/14/2025 1337   STOP-BANG Total Score 7 filed at 04/14/2025 1337          Prodigy: High Risk  Total Score: 26              Prodigy Age Score      Prodigy Gender Score     Prodigy Previous Opioid Use Score      Prodigy CHF score          ARISCAT Score for Postoperative Pulmonary Complications      Flowsheet Row Pre-Admission Testing from 4/15/2025 in [facility] [place]   Age Calculated Score 3 filed at 04/14/2025 1341   Preoperative SpO2 0 filed at 04/14/2025 1341   Respiratory infection in the last month Either upper or lower (i.e., URI, bronchitis, pneumonia), with fever and antibiotic treatment 0 filed at 04/14/2025 1341   Preoperative anemia (Hgb less than 10 g/dl) 0 filed at 04/14/2025 1341   Surgical incision  0 filed at 04/14/2025 1341   Duration of surgery  23 filed at 04/14/2025 1341   Emergency Procedure  0 filed at 04/14/2025 1341   ARISCAT Total Score  26 filed at 04/14/2025 1341          Zara Perioperative Risk for Myocardial Infarction or Cardiac Arrest (FANY)      Flowsheet Row Pre-Admission Testing from  4/15/2025 in [facility] [place]   Calculated Age Score 1.22 filed at 04/14/2025 1342   Functional Status  0 filed at 04/14/2025 1342   ASA Class  -1.92 filed at 04/14/2025 1342   Creatinine 0.61 filed at 04/14/2025 1342   Type of Procedure  0.80 filed at 04/14/2025 1342   FANY Total Score  -4.54 filed at 04/14/2025 1342   FANY % 1.06 filed at 04/14/2025 1342            Assessment and Plan:     Assessment and Plan:     Preop:   OR with Dr. Campbell on 4/24/25 for L3-4 right microdiscectomy, instrumentation, posterior interbody fusion, posterior lateral fusion   Labs on file from 4/1/25-A1c, CBC, CMP. T&S, PT-INR, MRSA to be completed today  EKG obtained and enclosed. SR with PVCs. EKG on file for comparison.      EKG faxed to Dr. Modi and cardiac clearance requested.     Patient was instructed to stop vascepa 7 days prior to surgery by Dr. Lange (per patient wife).     Neurologic:   The patient is at an increased risk for post operative delirium secondary to polypharmacy , renal insufficiency , and type and duration of surgery . Preoperative brain exercise educational handout provided to patient.  The patient is at an increased risk for perioperative stroke secondary to chronic renal failure , cardiac disease, increased age, HTN, HLD, DM , and general anesthesia.    Cardiac:  NSTEMI: in 2021-negative cardiac catheterization   Hypertension: Controlled with medical management   Hyperlipidemia: On stain, fenofibrate and vascepa   Nonischemic idiopathic cardiomyopathy: EF was 15-20%. Cardiac MRI showed EF of 43% in 2022.    CHF: Stable. Asymptomatic.   Follows with cardiology annually-Dr. Modi   Poe Activity Status Index (DASI)  DASI Score: 58.2   MET Score: 9.9  RCRI  2 which is 10.1% 30 day risk of MACE (risk for cardiac death, nonfatal myocardial infarction, and nonfactal cardiac arrest)  FANY score which indicates a  1.06% risk of intraoperative or 30-day postoperative MACE    Pulmonary:   YUVAL: Uses  CPAP   STOP-BANG score of  7. High risk of obstructive sleep apnea.   ARISCAT:   26  points which is a intermediate (13.3%) risk of in-hospital post-op pulmonary complications     Endocrine:  Hypothyroidism: On levothyroxine   DM II: Stable on oral medication   HEMOGLOBIN A1c   Date Value Ref Range Status   04/01/2025 6.7 (H) <5.7 % of total Hgb Final     Comment:     For someone without known diabetes, a hemoglobin A1c  value of 6.5% or greater indicates that they may have   diabetes and this should be confirmed with a follow-up   test.     For someone with known diabetes, a value <7% indicates   that their diabetes is well controlled and a value   greater than or equal to 7% indicates suboptimal   control. A1c targets should be individualized based on   duration of diabetes, age, comorbid conditions, and   other considerations.     Currently, no consensus exists regarding use of  hemoglobin A1c for diagnosis of diabetes for children.              GI:  GERD: Stable on protonix   Apfel: 2 points 39% risk for post operative N/V    /Renal:   CKD III: Stable based on recent lab work. Follows with nephrology-Dr. Handley     Neuro-muscular:   RA: Takes plaquenil and rinvoq. States rheumatologist gave instructions on when to stop prior to surgery. Dx at age 12. Hands, shoulders, hip and spine-most joints.    Neuropathy: right foot-cannot feel it due to a  injury     Hematologic:   Caprini score 11, patient at high risk for perioperative DVT. Patient provided with VTE education/handout.     Skin check: Patient was instructed to make surgeon aware of any skin changes/concerns prior to surgery.     Anesthesia: No history of anesthesia complications. No anesthesia concerns.      *See risk scores as previously documented

## 2025-04-14 NOTE — TELEPHONE ENCOUNTER
Received request for prescription refills for patient.   Patient follows with Dr Modi    Request is for Carvedilol  Is patient currently on medication Y    Last OV 10/9/24  Next OV 10/8/25    Pended for signing and sent to provider

## 2025-04-15 ENCOUNTER — PRE-ADMISSION TESTING (OUTPATIENT)
Dept: PREADMISSION TESTING | Facility: HOSPITAL | Age: 60
End: 2025-04-15
Payer: COMMERCIAL

## 2025-04-15 ENCOUNTER — LAB (OUTPATIENT)
Dept: LAB | Facility: HOSPITAL | Age: 60
End: 2025-04-15
Payer: COMMERCIAL

## 2025-04-15 VITALS
BODY MASS INDEX: 36.21 KG/M2 | DIASTOLIC BLOOD PRESSURE: 67 MMHG | HEIGHT: 69 IN | WEIGHT: 244.49 LBS | HEART RATE: 62 BPM | RESPIRATION RATE: 16 BRPM | TEMPERATURE: 97 F | OXYGEN SATURATION: 95 % | SYSTOLIC BLOOD PRESSURE: 108 MMHG

## 2025-04-15 DIAGNOSIS — Z01.818 PREOP EXAMINATION: ICD-10-CM

## 2025-04-15 DIAGNOSIS — M51.26 OTHER INTERVERTEBRAL DISC DISPLACEMENT, LUMBAR REGION: Primary | ICD-10-CM

## 2025-04-15 DIAGNOSIS — Z01.818 PRE-OP TESTING: ICD-10-CM

## 2025-04-15 DIAGNOSIS — Z01.818 ENCOUNTER FOR OTHER PREPROCEDURAL EXAMINATION: ICD-10-CM

## 2025-04-15 DIAGNOSIS — M43.16 SPONDYLOLISTHESIS, LUMBAR REGION: ICD-10-CM

## 2025-04-15 DIAGNOSIS — M54.16 LUMBAR RADICULOPATHY: ICD-10-CM

## 2025-04-15 LAB
ABO GROUP (TYPE) IN BLOOD: NORMAL
ANTIBODY SCREEN: NORMAL
ATRIAL RATE: 94 BPM
INR PPP: 1 (ref 0.9–1.1)
P AXIS: 43 DEGREES
P OFFSET: 181 MS
P ONSET: 118 MS
PR INTERVAL: 190 MS
PROTHROMBIN TIME: 10.7 SECONDS (ref 9.8–12.4)
Q ONSET: 213 MS
QRS COUNT: 16 BEATS
QRS DURATION: 102 MS
QT INTERVAL: 362 MS
QTC CALCULATION(BAZETT): 452 MS
QTC FREDERICIA: 420 MS
R AXIS: -8 DEGREES
RH FACTOR (ANTIGEN D): NORMAL
T AXIS: 5 DEGREES
T OFFSET: 394 MS
VENTRICULAR RATE: 94 BPM

## 2025-04-15 PROCEDURE — 87081 CULTURE SCREEN ONLY: CPT | Mod: STJLAB

## 2025-04-15 PROCEDURE — 99202 OFFICE O/P NEW SF 15 MIN: CPT

## 2025-04-15 PROCEDURE — 93005 ELECTROCARDIOGRAM TRACING: CPT

## 2025-04-15 RX ORDER — CHLORHEXIDINE GLUCONATE ORAL RINSE 1.2 MG/ML
SOLUTION DENTAL
Qty: 473 ML | Refills: 0 | Status: SHIPPED | OUTPATIENT
Start: 2025-04-15

## 2025-04-15 RX ORDER — TRAMADOL HYDROCHLORIDE 50 MG/1
50 TABLET ORAL EVERY 8 HOURS PRN
Qty: 20 TABLET | Refills: 0 | Status: SHIPPED | OUTPATIENT
Start: 2025-04-15 | End: 2025-04-26 | Stop reason: HOSPADM

## 2025-04-15 NOTE — PREPROCEDURE INSTRUCTIONS
Thank you for visiting Preadmission Testing at [facility]. If you have any changes to your health condition, please call the SURGEON's office to alert them and give them details of your symptoms.  STOP all NSAIDS (Ibuprofen, Motrin, Aleve), vitamins, herbals, supplements, and all over the counter medications for 7 days prior to surgery  Tylenol is okay to take up until the morning of surgery for pain or headache if needed         Preoperative Brain Exercises    What are brain exercises?  A brain exercise is any activity that engages your thinking (cognitive) skills.    What types of activities are considered brain exercises?  Jigsaw puzzles, crossword puzzles, word jumble, memory games, word search, and many more.  Many can be found free online or on your phone via a mobile tim.    Why should I do brain exercises before my surgery?  More recent research has shown brain exercise before surgery can lower the risk of postoperative delirium (confusion) which can be especially important for older adults.  Patients who did brain exercises for 5 to 10 hours the days before surgery, cut their risk of postoperative delirium in half up to 1 week after surgery.      Preoperative Deep Breathing Exercises    Why it is important to do deep breathing exercises before my surgery?  Deep breathing exercises strengthen your breathing muscles.  This helps you to recover after your surgery and decreases the chance of breathing complications.    How are the deep breathing exercises done?  Sit straight with your back supported.  Breathe in deeply and slowly through your nose. Your lower rib cage should expand and your abdomen may move forward.  Hold that breath for 3 to 5 seconds.  Breathe out through pursed lips, slowly and completely.  Rest and repeat 10 times every hour while awake.  Rest longer if you become dizzy or lightheaded.      Patient and Family Education   Ways You Can Help Prevent Blood Clots     This handout explains some simple  things you can do to help prevent blood clots.      Blood clots are blockages that can form in the body's veins. When a blood clot forms in your deep veins, it may be called a deep vein thrombosis, or DVT for short. Blood clots can happen in any part of the body where blood flows, but they are most common in the arms and legs. If a piece of a blood clot breaks free and travels to the lungs, it is called a pulmonary embolus (PE). A PE can be a very serious problem.      Being in the hospital or having surgery can raise your chances of getting a blood clot because you may not be well enough to move around as much as you normally do.      Ways you can help prevent blood clots in the hospital         Wearing SCDs. SCDs stands for Sequential Compression Devices.   SCDs are special sleeves that wrap around your legs  They attach to a pump that fills them with air to gently squeeze your legs every few minutes.   This helps return the blood in your legs to your heart.   SCDs should only be taken off when walking or bathing.   SCDs may not be comfortable, but they can help save your life.               Wearing compression stockings - if your doctor orders them. These special snug fitting stockings gently squeeze your legs to help blood flow.       Walking. Walking helps move the blood in your legs.   If your doctor says it is ok, try walking the halls at least   5 times a day. Ask us to help you get up, so you don't fall.      Taking any blood thinning medicines your doctor orders.          ©[facility]; 3/23        Ways you can help prevent blood clots at home       Wearing compression stockings - if your doctor orders them. ? Walking - to help move the blood in your legs.       Taking any blood thinning medicines your doctor orders.      Signs of a blood clot or PE      Tell your doctor or nurse know right away if you have of the problems listed below.    If you are at home, seek medical care right away. Call [phone]  for chest pain or problems breathing.          Signs of a blood clot (DVT) - such as pain,  swelling, redness or warmth in your arm or leg      Signs of a pulmonary embolism (PE) - such as chest     pain or feeling short of breath

## 2025-04-15 NOTE — PREPROCEDURE INSTRUCTIONS
"   Medication List            Accurate as of April 15, 2025  1:40 PM. Always use your most recent med list.                amLODIPine 10 mg tablet  Commonly known as: Norvasc  Take 1 tablet (10 mg) by mouth once daily.  Medication Adjustments for Surgery: Take/Use as prescribed     atorvastatin 10 mg tablet  Commonly known as: Lipitor  Take 1 tablet (10 mg) by mouth once daily.  Medication Adjustments for Surgery: Take/Use as prescribed     baclofen 10 mg tablet  Commonly known as: Lioresal  TAKE 1 TABLET BY MOUTH THREE TIMES A DAY AS NEEDED FOR MUSCLE SPASM  Medication Adjustments for Surgery: Take/Use as prescribed     * BD Luer-Kate Syringe 3 mL 18 x 1 1/2\" syringe  Generic drug: syringe with needle  3 mL every 14 (fourteen) days. Use as directed     * BD Luer-Kate Syringe 3 mL 23 x 1\" syringe  Generic drug: syringe with needle  USE 1 EVERY 14 AS DIRECTED EVERY OTHER WEEK FOR INJECTIONS     Blood glucose monitoring meter     carvedilol 25 mg tablet  Commonly known as: Coreg  Take 1.5 tablets (37.5 mg) by mouth 2 times daily (morning and late afternoon).  Medication Adjustments for Surgery: Take/Use as prescribed     chlorhexidine 0.12 % solution  Commonly known as: Peridex  15 milliliter(s) orally once a day for 2 doses 15 ml  the night before surgery and 15 ml morning of surgery - swish for 30 seconds -DO NOT SWALLOW, SPIT OUT     Contour Next Test Strips  Generic drug: blood sugar diagnostic  1 strip in the morning and 1 strip in the evening.     DULoxetine 60 mg DR capsule  Commonly known as: Cymbalta  Take 1 capsule (60 mg) by mouth 2 times a day.  Medication Adjustments for Surgery: Take/Use as prescribed     Entresto  mg tablet  Generic drug: sacubitriL-valsartan  Take 1 tablet by mouth 2 times a day.  Additional Medication Adjustments for Surgery: Other (Comment)  Notes to patient: Coordinate with prescribing provider for further instructions on this medications prior to surgery.    General " recommendation: HOLD any evening dose the night before the day of surgery  HOLD the day of surgery     fenofibrate 160 mg tablet  Commonly known as: Triglide  Take 1 tablet (160 mg) by mouth once daily.  Additional Medication Adjustments for Surgery: Other (Comment)  Notes to patient: HOLD 24 hours before day of surgery and day of surgery.     gabapentin 300 mg capsule  Commonly known as: Neurontin  Take 1 capsule (300 mg) by mouth 2 times a day.  Medication Adjustments for Surgery: Take/Use as prescribed     glycopyrrolate 1 mg tablet  Commonly known as: Robinul  Take 2 tablets (2 mg) by mouth 2 times a day.  Medication Adjustments for Surgery: Take/Use as prescribed     Glyxambi 25-5 mg  Generic drug: empagliflozin-linagliptin  Take 1 tablet by mouth once daily.  Medication Adjustments for Surgery: Take last dose 3 days before surgery     hydroxychloroquine 200 mg tablet  Commonly known as: Plaquenil  Additional Medication Adjustments for Surgery: Other (Comment)  Notes to patient: Coordinate with prescribing provider for further instructions on this medications prior to surgery.     icosapent ethyL 1 gram capsule  Commonly known as: Vascepa  Take 2 capsules (2 g) by mouth 2 times daily (morning and late afternoon).  Additional Medication Adjustments for Surgery: Other (Comment)  Notes to patient: Coordinate with prescribing provider for further instructions on this medications prior to surgery.     Jardiance 10 mg tablet  Generic drug: empagliflozin  Medication Adjustments for Surgery: Take last dose 3 days before surgery     lancets 33 gauge misc     levothyroxine 75 mcg tablet  Commonly known as: Synthroid, Levoxyl  Take 6 of 7 mornings a week on an empty stomach at the same time each day, either 30 to 60 minutes prior to breakfast  Medication Adjustments for Surgery: Take/Use as prescribed     metFORMIN 500 mg tablet  Commonly known as: Glucophage  TAKE 1 TABLET BY MOUTH TWICE  DAILY  Medication Adjustments  for Surgery: Take last dose 1 day (24 hours) before surgery     methylPREDNISolone 4 mg tablets  Commonly known as: Medrol Dospak  Follow schedule on package instructions  Additional Medication Adjustments for Surgery: Other (Comment)  Notes to patient: Coordinate with prescribing provider for further instructions on this medications prior to surgery.     MULTI-DAY PLUS MINERALS ORAL  Additional Medication Adjustments for Surgery: Take last dose 7 days before surgery     oxybutynin XL 10 mg 24 hr tablet  Commonly known as: Ditropan-XL  Take 1 tablet (10 mg) by mouth once daily.  Medication Adjustments for Surgery: Do Not take on the morning of surgery     pantoprazole 40 mg EC tablet  Commonly known as: ProtoNix  TAKE 1 TABLET BY MOUTH ONCE  DAILY IN THE MORNING TAKE BEFORE MEALS  Medication Adjustments for Surgery: Take/Use as prescribed     Rinvoq 15 mg tablet extended release 24 hr  Generic drug: upadacitinib ER  Additional Medication Adjustments for Surgery: Other (Comment)  Notes to patient: Coordinate with prescribing provider for further instructions on this medications prior to surgery.     spironolactone 50 mg tablet  Commonly known as: Aldactone  Take 1 tablet (50 mg) by mouth once daily.  Medication Adjustments for Surgery: Take/Use as prescribed     syringe (disposable) 3 mL syringe  Commonly known as: BD Luer-Kate Syringe  1 each every 14 (fourteen) days. 22G x 1' 3ML, Use as directed every other week for injections     testosterone cypionate 200 mg/mL injection  Commonly known as: Depo-Testosterone  400 mg intramuscularly every 3 weeks, single dose vials.  Additional Medication Adjustments for Surgery: Other (Comment)  Notes to patient: General Recommendations: CONTINUE through the perioperative period including the day of surgery     traMADol 50 mg tablet  Commonly known as: Ultram  Take 1 tablet (50 mg) by mouth every 8 hours if needed for severe pain (7 - 10) for up to 7 days.  Medication Adjustments  for Surgery: Take/Use as prescribed     VITAMIN D2 ORAL  Additional Medication Adjustments for Surgery: Take last dose 7 days before surgery           * This list has 2 medication(s) that are the same as other medications prescribed for you. Read the directions carefully, and ask your doctor or other care provider to review them with you.                      PRE-OPERATIVE INSTRUCTIONS    You will receive notification one business day prior to your procedure to confirm your arrival time. It is important that you answer your phone and/or check your messages during this time. If you do not hear from the surgery center by 5 pm. the day before your procedure, please call [phone].     Please enter the building through the Outpatient entrance and take the elevator off the lobby to the 2nd floor then check in at the Outpatient Surgery desk on the 2nd floor.    INSTRUCTIONS:  Talk to your surgeon for instructions if you should stop your aspirin, blood thinner, or diabetes medicines.  DO NOT take any multivitamins or over the counter supplements for 7-10 days before surgery.  If not being admitted, you must have an adult immediately available to drive you home after surgery. We also highly recommend you have someone stay with you for the entire day and night of your surgery.  For children having surgery, a parent or legal guardian must accompany them to the surgery center. If this is not possible, please call [phone] to make additional arrangements.  For adults who are unable to consent or make medical decisions for themselves, a legal guardian or Power of  must accompany them to the surgery center. If this is not possible, please call [phone] to make additional arrangements.  Wear comfortable, loose fitting clothing.  All jewelry and piercings must be removed. If you are unable to remove an item or have a dermal piercing, please be sure to tell the nurse when you arrive for surgery.  Nail polish and  make-up must be removed.  Avoid smoking or consuming alcohol for 24 hours before surgery.  To help prevent infection, please take a shower/bath and wash your hair the night before and/or morning of surgery (or follow other specific bathing instructions provided).    Preoperative Fasting Guidelines    Why must I stop eating and drinking near surgery time?  With sedation, food or liquid in your stomach can enter your lungs causing serious complications  Increases nausea and vomiting    When do I need to stop eating and drinking before my surgery?  Do not eat any solid food after midnight the night before your surgery/procedure unless otherwise instructed by your surgeon.   If you take a GLP-1 medication (ex: Trulicity, Ozempic, Mounjaro, Zepbound, Bydyreon, Tanzeun, Saxenda, Victoza, Adlyxin, Rybelus) please follow other specific instructions provided regarding preoperative          fasting.  You may have up to 13.5 ounces of clear liquid until TWO hours before your instructed arrival time to the hospital.  This includes water, black tea/coffee, (no milk or cream) apple juice, and electrolyte drinks (Gatorade).   You may chew gum until TWO hours before your surgery/procedure         If applicable, notify your surgeons office immediately of any new skin changes that occur to the surgical limb.      If you have any questions or concerns, please call Pre-Admission Testing at [phone].

## 2025-04-15 NOTE — TELEPHONE ENCOUNTER
Per Dr. Modi, DO patient is cleared for procedure as requested.   Form signed and faxed with confirmation received. Placed to scanning.     Patient is not on any oral anticoagulants.

## 2025-04-16 ENCOUNTER — TELEPHONE (OUTPATIENT)
Dept: ORTHOPEDIC SURGERY | Facility: CLINIC | Age: 60
End: 2025-04-16
Payer: COMMERCIAL

## 2025-04-16 NOTE — TELEPHONE ENCOUNTER
Spoke to Dr. Dale office and Pt was cleared for surgery on the 24th and given a list of meds to stop before surgery. Media scanned into chart

## 2025-04-16 NOTE — TELEPHONE ENCOUNTER
Reached out to Dr. Quiroz's office to inquire if patient is cleared for surgery through secure chat he stated patient is cleared for surgery. Scanned media into patients chart.

## 2025-04-17 LAB — STAPHYLOCOCCUS SPEC CULT: ABNORMAL

## 2025-04-18 ENCOUNTER — ANESTHESIA EVENT (OUTPATIENT)
Dept: OPERATING ROOM | Facility: HOSPITAL | Age: 60
End: 2025-04-18
Payer: COMMERCIAL

## 2025-04-21 ENCOUNTER — TELEPHONE (OUTPATIENT)
Dept: CARDIOLOGY | Facility: CLINIC | Age: 60
End: 2025-04-21
Payer: COMMERCIAL

## 2025-04-21 NOTE — TELEPHONE ENCOUNTER
Received copy of EKG performed during POC testing.     Patient is pending L3-4 Microdiscectomy with Dr. Nazario MD on 4/24/25.     Please see 4/15/25 encounter labeled preop clearance for full details.     I will print most recent EKG and previous 2021 EKG for Dr. Freddy Nam MD  to review in Dr. Modi, DO absence today from clinic.     Will follow up

## 2025-04-22 NOTE — TELEPHONE ENCOUNTER
Per Dr. Freddy Nam MD  covering for Dr. Ly DO in clinic.     EKG reviewed. No changes from previous.   Patient still acceptable candidate for planned Microdiscectomy.     Faxed this note with signed updated clearance form with confirmation received. Placed to scanning.

## 2025-04-24 ENCOUNTER — ANESTHESIA (OUTPATIENT)
Dept: OPERATING ROOM | Facility: HOSPITAL | Age: 60
End: 2025-04-24
Payer: COMMERCIAL

## 2025-04-24 ENCOUNTER — APPOINTMENT (OUTPATIENT)
Dept: RADIOLOGY | Facility: HOSPITAL | Age: 60
End: 2025-04-24
Payer: COMMERCIAL

## 2025-04-24 ENCOUNTER — TRANSCRIBE ORDERS (OUTPATIENT)
Dept: OPERATING ROOM | Facility: HOSPITAL | Age: 60
End: 2025-04-24
Payer: COMMERCIAL

## 2025-04-24 ENCOUNTER — HOSPITAL ENCOUNTER (OUTPATIENT)
Facility: HOSPITAL | Age: 60
LOS: 1 days | Discharge: HOME | End: 2025-04-26
Attending: ORTHOPAEDIC SURGERY | Admitting: ORTHOPAEDIC SURGERY
Payer: COMMERCIAL

## 2025-04-24 DIAGNOSIS — Z01.818 PRE-OP TESTING: Primary | ICD-10-CM

## 2025-04-24 DIAGNOSIS — I50.22 CHRONIC SYSTOLIC (CONGESTIVE) HEART FAILURE: ICD-10-CM

## 2025-04-24 DIAGNOSIS — G89.18 POSTOPERATIVE PAIN AFTER SPINAL SURGERY: ICD-10-CM

## 2025-04-24 DIAGNOSIS — M51.26 OTHER INTERVERTEBRAL DISC DISPLACEMENT, LUMBAR REGION: ICD-10-CM

## 2025-04-24 DIAGNOSIS — I95.89 OTHER SPECIFIED HYPOTENSION: ICD-10-CM

## 2025-04-24 DIAGNOSIS — M54.40 BACK PAIN OF LUMBAR REGION WITH SCIATICA: Primary | ICD-10-CM

## 2025-04-24 DIAGNOSIS — I42.0 DILATED IDIOPATHIC CARDIOMYOPATHY (MULTI): ICD-10-CM

## 2025-04-24 DIAGNOSIS — M43.16 SPONDYLOLISTHESIS, LUMBAR REGION: ICD-10-CM

## 2025-04-24 LAB
ANION GAP BLDA CALCULATED.4IONS-SCNC: 11 MMO/L (ref 10–25)
BASE EXCESS BLDA CALC-SCNC: -2.9 MMOL/L (ref -2–3)
BODY TEMPERATURE: 37 DEGREES CELSIUS
CA-I BLDA-SCNC: 1.17 MMOL/L (ref 1.1–1.33)
CHLORIDE BLDA-SCNC: 106 MMOL/L (ref 98–107)
GLUCOSE BLD MANUAL STRIP-MCNC: 130 MG/DL (ref 74–99)
GLUCOSE BLD MANUAL STRIP-MCNC: 135 MG/DL (ref 74–99)
GLUCOSE BLDA-MCNC: 143 MG/DL (ref 74–99)
HCO3 BLDA-SCNC: 22.6 MMOL/L (ref 22–26)
HCT VFR BLD EST: 40 % (ref 41–52)
HGB BLDA-MCNC: 13.2 G/DL (ref 13.5–17.5)
LACTATE BLDA-SCNC: 1.5 MMOL/L (ref 0.4–2)
OXYHGB MFR BLDA: 96.7 % (ref 94–98)
PCO2 BLDA: 41 MM HG (ref 38–42)
PH BLDA: 7.35 PH (ref 7.38–7.42)
PO2 BLDA: 226 MM HG (ref 85–95)
POTASSIUM BLDA-SCNC: 4.4 MMOL/L (ref 3.5–5.3)
SAO2 % BLDA: 100 % (ref 94–100)
SODIUM BLDA-SCNC: 135 MMOL/L (ref 136–145)

## 2025-04-24 PROCEDURE — 84132 ASSAY OF SERUM POTASSIUM: CPT

## 2025-04-24 PROCEDURE — C1889 IMPLANT/INSERT DEVICE, NOC: HCPCS | Performed by: ORTHOPAEDIC SURGERY

## 2025-04-24 PROCEDURE — 99222 1ST HOSP IP/OBS MODERATE 55: CPT | Performed by: STUDENT IN AN ORGANIZED HEALTH CARE EDUCATION/TRAINING PROGRAM

## 2025-04-24 PROCEDURE — 20936 SP BONE AGRFT LOCAL ADD-ON: CPT | Performed by: PHYSICIAN ASSISTANT

## 2025-04-24 PROCEDURE — 3700000001 HC GENERAL ANESTHESIA TIME - INITIAL BASE CHARGE: Performed by: ORTHOPAEDIC SURGERY

## 2025-04-24 PROCEDURE — 88311 DECALCIFY TISSUE: CPT | Performed by: PATHOLOGY

## 2025-04-24 PROCEDURE — 88304 TISSUE EXAM BY PATHOLOGIST: CPT | Performed by: PATHOLOGY

## 2025-04-24 PROCEDURE — 22633 ARTHRD CMBN 1NTRSPC LUMBAR: CPT | Performed by: ORTHOPAEDIC SURGERY

## 2025-04-24 PROCEDURE — 2060000001 HC INTERMEDIATE ICU ROOM DAILY

## 2025-04-24 PROCEDURE — 20930 SP BONE ALGRFT MORSEL ADD-ON: CPT | Performed by: ORTHOPAEDIC SURGERY

## 2025-04-24 PROCEDURE — 63030 LAMOT DCMPRN NRV RT 1 LMBR: CPT | Performed by: ORTHOPAEDIC SURGERY

## 2025-04-24 PROCEDURE — 7100000002 HC RECOVERY ROOM TIME - EACH INCREMENTAL 1 MINUTE: Performed by: ORTHOPAEDIC SURGERY

## 2025-04-24 PROCEDURE — 20930 SP BONE ALGRFT MORSEL ADD-ON: CPT | Performed by: PHYSICIAN ASSISTANT

## 2025-04-24 PROCEDURE — 2500000005 HC RX 250 GENERAL PHARMACY W/O HCPCS: Performed by: ANESTHESIOLOGIST ASSISTANT

## 2025-04-24 PROCEDURE — 2500000004 HC RX 250 GENERAL PHARMACY W/ HCPCS (ALT 636 FOR OP/ED): Mod: JZ | Performed by: STUDENT IN AN ORGANIZED HEALTH CARE EDUCATION/TRAINING PROGRAM

## 2025-04-24 PROCEDURE — 2500000005 HC RX 250 GENERAL PHARMACY W/O HCPCS: Performed by: PHYSICIAN ASSISTANT

## 2025-04-24 PROCEDURE — 22633 ARTHRD CMBN 1NTRSPC LUMBAR: CPT | Performed by: PHYSICIAN ASSISTANT

## 2025-04-24 PROCEDURE — 2500000002 HC RX 250 W HCPCS SELF ADMINISTERED DRUGS (ALT 637 FOR MEDICARE OP, ALT 636 FOR OP/ED): Performed by: PHYSICIAN ASSISTANT

## 2025-04-24 PROCEDURE — 22853 INSJ BIOMECHANICAL DEVICE: CPT | Performed by: ORTHOPAEDIC SURGERY

## 2025-04-24 PROCEDURE — A22633 PR ARTHDSIS POST/POSTEROLATRL/POSTINTERBODY LUMBAR: Performed by: STUDENT IN AN ORGANIZED HEALTH CARE EDUCATION/TRAINING PROGRAM

## 2025-04-24 PROCEDURE — 36620 INSERTION CATHETER ARTERY: CPT | Performed by: STUDENT IN AN ORGANIZED HEALTH CARE EDUCATION/TRAINING PROGRAM

## 2025-04-24 PROCEDURE — 63052 LAM FACETC/FRMT ARTHRD LUM 1: CPT | Performed by: PHYSICIAN ASSISTANT

## 2025-04-24 PROCEDURE — 36415 COLL VENOUS BLD VENIPUNCTURE: CPT

## 2025-04-24 PROCEDURE — 2500000005 HC RX 250 GENERAL PHARMACY W/O HCPCS: Performed by: STUDENT IN AN ORGANIZED HEALTH CARE EDUCATION/TRAINING PROGRAM

## 2025-04-24 PROCEDURE — 3700000002 HC GENERAL ANESTHESIA TIME - EACH INCREMENTAL 1 MINUTE: Performed by: ORTHOPAEDIC SURGERY

## 2025-04-24 PROCEDURE — 2500000001 HC RX 250 WO HCPCS SELF ADMINISTERED DRUGS (ALT 637 FOR MEDICARE OP): Performed by: STUDENT IN AN ORGANIZED HEALTH CARE EDUCATION/TRAINING PROGRAM

## 2025-04-24 PROCEDURE — 22840 INSERT SPINE FIXATION DEVICE: CPT | Performed by: PHYSICIAN ASSISTANT

## 2025-04-24 PROCEDURE — C1726 CATH, BAL DIL, NON-VASCULAR: HCPCS | Performed by: ORTHOPAEDIC SURGERY

## 2025-04-24 PROCEDURE — 94660 CPAP INITIATION&MGMT: CPT

## 2025-04-24 PROCEDURE — 22853 INSJ BIOMECHANICAL DEVICE: CPT | Performed by: PHYSICIAN ASSISTANT

## 2025-04-24 PROCEDURE — A9999 DME SUPPLY OR ACCESSORY, NOS: HCPCS | Performed by: ORTHOPAEDIC SURGERY

## 2025-04-24 PROCEDURE — A22633 PR ARTHDSIS POST/POSTEROLATRL/POSTINTERBODY LUMBAR: Performed by: ANESTHESIOLOGIST ASSISTANT

## 2025-04-24 PROCEDURE — 2500000001 HC RX 250 WO HCPCS SELF ADMINISTERED DRUGS (ALT 637 FOR MEDICARE OP): Performed by: PHYSICIAN ASSISTANT

## 2025-04-24 PROCEDURE — 3600000010 HC OR TIME - EACH INCREMENTAL 1 MINUTE - PROCEDURE LEVEL FIVE: Performed by: ORTHOPAEDIC SURGERY

## 2025-04-24 PROCEDURE — 3600000005 HC OR TIME - INITIAL BASE CHARGE - PROCEDURE LEVEL FIVE: Performed by: ORTHOPAEDIC SURGERY

## 2025-04-24 PROCEDURE — 20936 SP BONE AGRFT LOCAL ADD-ON: CPT | Performed by: ORTHOPAEDIC SURGERY

## 2025-04-24 PROCEDURE — 2500000004 HC RX 250 GENERAL PHARMACY W/ HCPCS (ALT 636 FOR OP/ED): Performed by: PHYSICIAN ASSISTANT

## 2025-04-24 PROCEDURE — 82947 ASSAY GLUCOSE BLOOD QUANT: CPT | Mod: 59

## 2025-04-24 PROCEDURE — 63030 LAMOT DCMPRN NRV RT 1 LMBR: CPT | Performed by: PHYSICIAN ASSISTANT

## 2025-04-24 PROCEDURE — 82947 ASSAY GLUCOSE BLOOD QUANT: CPT

## 2025-04-24 PROCEDURE — 7100000001 HC RECOVERY ROOM TIME - INITIAL BASE CHARGE: Performed by: ORTHOPAEDIC SURGERY

## 2025-04-24 PROCEDURE — 2500000004 HC RX 250 GENERAL PHARMACY W/ HCPCS (ALT 636 FOR OP/ED): Mod: JZ | Performed by: ANESTHESIOLOGIST ASSISTANT

## 2025-04-24 PROCEDURE — 63052 LAM FACETC/FRMT ARTHRD LUM 1: CPT | Performed by: ORTHOPAEDIC SURGERY

## 2025-04-24 PROCEDURE — 83735 ASSAY OF MAGNESIUM: CPT

## 2025-04-24 PROCEDURE — 2720000007 HC OR 272 NO HCPCS: Performed by: ORTHOPAEDIC SURGERY

## 2025-04-24 PROCEDURE — 64635 DESTROY LUMB/SAC FACET JNT: CPT | Performed by: ORTHOPAEDIC SURGERY

## 2025-04-24 PROCEDURE — 82805 BLOOD GASES W/O2 SATURATION: CPT

## 2025-04-24 PROCEDURE — 2780000003 HC OR 278 NO HCPCS: Performed by: ORTHOPAEDIC SURGERY

## 2025-04-24 PROCEDURE — 22840 INSERT SPINE FIXATION DEVICE: CPT | Performed by: ORTHOPAEDIC SURGERY

## 2025-04-24 PROCEDURE — C1713 ANCHOR/SCREW BN/BN,TIS/BN: HCPCS | Performed by: ORTHOPAEDIC SURGERY

## 2025-04-24 PROCEDURE — 88311 DECALCIFY TISSUE: CPT | Mod: TC,STJLAB,WESLAB | Performed by: ORTHOPAEDIC SURGERY

## 2025-04-24 PROCEDURE — A6010 COLLAGEN BASED WOUND FILLER: HCPCS | Performed by: ORTHOPAEDIC SURGERY

## 2025-04-24 DEVICE — K-WIRE, RELINE MAS NITINOL, BLUNT THREAD: Type: IMPLANTABLE DEVICE | Site: SPINE LUMBAR | Status: FUNCTIONAL

## 2025-04-24 DEVICE — BONE GRAFT SUBSTITUTE - SILICATE SUBSTITUTED CALCIUM PHOSPHATE - 7.5ML PACK SIZE
Type: IMPLANTABLE DEVICE | Site: SPINE LUMBAR | Status: FUNCTIONAL
Brand: ACTIFUSE MIS

## 2025-04-24 DEVICE — RISE SPACER 10X26MM, 7-14MM
Type: IMPLANTABLE DEVICE | Site: SPINE LUMBAR | Status: FUNCTIONAL
Brand: RISE

## 2025-04-24 DEVICE — RELINE MAS RED SCREW, 6.5X50MM POLY 2C
Type: IMPLANTABLE DEVICE | Site: SPINE LUMBAR | Status: FUNCTIONAL
Brand: RELINE

## 2025-04-24 DEVICE — SCREW, RELINE LOCK, 5.5MM OPEN TULIP: Type: IMPLANTABLE DEVICE | Site: SPINE LUMBAR | Status: FUNCTIONAL

## 2025-04-24 DEVICE — RELINE MAS TI ROD, 5.5X40MM LORDOTIC
Type: IMPLANTABLE DEVICE | Site: SPINE LUMBAR | Status: FUNCTIONAL
Brand: RELINE

## 2025-04-24 RX ORDER — ALBUTEROL SULFATE 0.83 MG/ML
2.5 SOLUTION RESPIRATORY (INHALATION) ONCE AS NEEDED
Status: DISCONTINUED | OUTPATIENT
Start: 2025-04-24 | End: 2025-04-24 | Stop reason: HOSPADM

## 2025-04-24 RX ORDER — CEFAZOLIN SODIUM 2 G/100ML
2 INJECTION, SOLUTION INTRAVENOUS ONCE
Status: COMPLETED | OUTPATIENT
Start: 2025-04-24 | End: 2025-04-24

## 2025-04-24 RX ORDER — CEFAZOLIN SODIUM 2 G/100ML
2 INJECTION, SOLUTION INTRAVENOUS EVERY 8 HOURS
Status: COMPLETED | OUTPATIENT
Start: 2025-04-24 | End: 2025-04-25

## 2025-04-24 RX ORDER — SODIUM CHLORIDE 9 MG/ML
100 INJECTION, SOLUTION INTRAVENOUS CONTINUOUS
Status: DISCONTINUED | OUTPATIENT
Start: 2025-04-24 | End: 2025-04-25

## 2025-04-24 RX ORDER — ACETAMINOPHEN 325 MG/1
650 TABLET ORAL EVERY 6 HOURS
Status: DISCONTINUED | OUTPATIENT
Start: 2025-04-24 | End: 2025-04-26 | Stop reason: HOSPADM

## 2025-04-24 RX ORDER — FENOFIBRATE 160 MG/1
160 TABLET ORAL DAILY
Status: DISCONTINUED | OUTPATIENT
Start: 2025-04-24 | End: 2025-04-26 | Stop reason: HOSPADM

## 2025-04-24 RX ORDER — OXYCODONE HYDROCHLORIDE 5 MG/1
2.5 TABLET ORAL EVERY 4 HOURS PRN
Status: DISCONTINUED | OUTPATIENT
Start: 2025-04-24 | End: 2025-04-26 | Stop reason: HOSPADM

## 2025-04-24 RX ORDER — DEXTROSE 50 % IN WATER (D50W) INTRAVENOUS SYRINGE
25
Status: DISCONTINUED | OUTPATIENT
Start: 2025-04-24 | End: 2025-04-26 | Stop reason: HOSPADM

## 2025-04-24 RX ORDER — LEVOTHYROXINE SODIUM 75 UG/1
75 TABLET ORAL DAILY
Status: DISCONTINUED | OUTPATIENT
Start: 2025-04-25 | End: 2025-04-26 | Stop reason: HOSPADM

## 2025-04-24 RX ORDER — SPIRONOLACTONE 25 MG/1
50 TABLET ORAL DAILY
Status: DISCONTINUED | OUTPATIENT
Start: 2025-04-24 | End: 2025-04-26

## 2025-04-24 RX ORDER — SODIUM CHLORIDE, SODIUM LACTATE, POTASSIUM CHLORIDE, CALCIUM CHLORIDE 600; 310; 30; 20 MG/100ML; MG/100ML; MG/100ML; MG/100ML
100 INJECTION, SOLUTION INTRAVENOUS CONTINUOUS
Status: ACTIVE | OUTPATIENT
Start: 2025-04-24 | End: 2025-04-24

## 2025-04-24 RX ORDER — DIPHENHYDRAMINE HYDROCHLORIDE 50 MG/ML
INJECTION, SOLUTION INTRAMUSCULAR; INTRAVENOUS AS NEEDED
Status: DISCONTINUED | OUTPATIENT
Start: 2025-04-24 | End: 2025-04-24

## 2025-04-24 RX ORDER — ONDANSETRON HYDROCHLORIDE 2 MG/ML
4 INJECTION, SOLUTION INTRAVENOUS EVERY 8 HOURS PRN
Status: DISCONTINUED | OUTPATIENT
Start: 2025-04-24 | End: 2025-04-26 | Stop reason: HOSPADM

## 2025-04-24 RX ORDER — MEPERIDINE HYDROCHLORIDE 50 MG/ML
12.5 INJECTION INTRAMUSCULAR; INTRAVENOUS; SUBCUTANEOUS EVERY 10 MIN PRN
Status: DISCONTINUED | OUTPATIENT
Start: 2025-04-24 | End: 2025-04-24 | Stop reason: HOSPADM

## 2025-04-24 RX ORDER — INSULIN LISPRO 100 [IU]/ML
0-10 INJECTION, SOLUTION INTRAVENOUS; SUBCUTANEOUS
Status: DISCONTINUED | OUTPATIENT
Start: 2025-04-25 | End: 2025-04-26 | Stop reason: HOSPADM

## 2025-04-24 RX ORDER — MIDAZOLAM HYDROCHLORIDE 1 MG/ML
INJECTION, SOLUTION INTRAMUSCULAR; INTRAVENOUS AS NEEDED
Status: DISCONTINUED | OUTPATIENT
Start: 2025-04-24 | End: 2025-04-24

## 2025-04-24 RX ORDER — ONDANSETRON HYDROCHLORIDE 2 MG/ML
4 INJECTION, SOLUTION INTRAVENOUS ONCE AS NEEDED
Status: COMPLETED | OUTPATIENT
Start: 2025-04-24 | End: 2025-04-24

## 2025-04-24 RX ORDER — ONDANSETRON 4 MG/1
4 TABLET, FILM COATED ORAL EVERY 8 HOURS PRN
Status: DISCONTINUED | OUTPATIENT
Start: 2025-04-24 | End: 2025-04-26 | Stop reason: HOSPADM

## 2025-04-24 RX ORDER — ONDANSETRON HYDROCHLORIDE 2 MG/ML
INJECTION, SOLUTION INTRAVENOUS AS NEEDED
Status: DISCONTINUED | OUTPATIENT
Start: 2025-04-24 | End: 2025-04-24

## 2025-04-24 RX ORDER — ATROPINE SULFATE 0.4 MG/ML
INJECTION, SOLUTION ENDOTRACHEAL; INTRAMEDULLARY; INTRAMUSCULAR; INTRAVENOUS; SUBCUTANEOUS AS NEEDED
Status: DISCONTINUED | OUTPATIENT
Start: 2025-04-24 | End: 2025-04-24

## 2025-04-24 RX ORDER — ACETAMINOPHEN 325 MG/1
650 TABLET ORAL ONCE
Status: COMPLETED | OUTPATIENT
Start: 2025-04-24 | End: 2025-04-24

## 2025-04-24 RX ORDER — PANTOPRAZOLE SODIUM 40 MG/1
40 TABLET, DELAYED RELEASE ORAL
Status: DISCONTINUED | OUTPATIENT
Start: 2025-04-25 | End: 2025-04-26 | Stop reason: HOSPADM

## 2025-04-24 RX ORDER — PHENYLEPHRINE HYDROCHLORIDE 10 MG/ML
INJECTION INTRAVENOUS AS NEEDED
Status: DISCONTINUED | OUTPATIENT
Start: 2025-04-24 | End: 2025-04-24

## 2025-04-24 RX ORDER — NALOXONE HYDROCHLORIDE 0.4 MG/ML
0.2 INJECTION, SOLUTION INTRAMUSCULAR; INTRAVENOUS; SUBCUTANEOUS EVERY 5 MIN PRN
Status: DISCONTINUED | OUTPATIENT
Start: 2025-04-24 | End: 2025-04-26 | Stop reason: HOSPADM

## 2025-04-24 RX ORDER — CELECOXIB 200 MG/1
200 CAPSULE ORAL ONCE
Status: COMPLETED | OUTPATIENT
Start: 2025-04-24 | End: 2025-04-24

## 2025-04-24 RX ORDER — CYCLOBENZAPRINE HCL 10 MG
10 TABLET ORAL 3 TIMES DAILY PRN
Status: DISCONTINUED | OUTPATIENT
Start: 2025-04-24 | End: 2025-04-26 | Stop reason: HOSPADM

## 2025-04-24 RX ORDER — OXYCODONE HYDROCHLORIDE 5 MG/1
5 TABLET ORAL EVERY 4 HOURS PRN
Status: DISCONTINUED | OUTPATIENT
Start: 2025-04-24 | End: 2025-04-26 | Stop reason: HOSPADM

## 2025-04-24 RX ORDER — PROPOFOL 10 MG/ML
INJECTION, EMULSION INTRAVENOUS AS NEEDED
Status: DISCONTINUED | OUTPATIENT
Start: 2025-04-24 | End: 2025-04-24

## 2025-04-24 RX ORDER — SUCCINYLCHOLINE CHLORIDE 100 MG/5ML
SYRINGE (ML) INTRAVENOUS AS NEEDED
Status: DISCONTINUED | OUTPATIENT
Start: 2025-04-24 | End: 2025-04-24

## 2025-04-24 RX ORDER — BACLOFEN 10 MG/1
10 TABLET ORAL 3 TIMES DAILY PRN
Status: DISCONTINUED | OUTPATIENT
Start: 2025-04-24 | End: 2025-04-26 | Stop reason: HOSPADM

## 2025-04-24 RX ORDER — FENTANYL CITRATE 50 UG/ML
INJECTION, SOLUTION INTRAMUSCULAR; INTRAVENOUS AS NEEDED
Status: DISCONTINUED | OUTPATIENT
Start: 2025-04-24 | End: 2025-04-24

## 2025-04-24 RX ORDER — OXYCODONE HYDROCHLORIDE 10 MG/1
10 TABLET ORAL EVERY 4 HOURS PRN
Status: DISCONTINUED | OUTPATIENT
Start: 2025-04-24 | End: 2025-04-26 | Stop reason: HOSPADM

## 2025-04-24 RX ORDER — FAMOTIDINE 10 MG/ML
INJECTION, SOLUTION INTRAVENOUS AS NEEDED
Status: DISCONTINUED | OUTPATIENT
Start: 2025-04-24 | End: 2025-04-24

## 2025-04-24 RX ORDER — DULOXETIN HYDROCHLORIDE 60 MG/1
60 CAPSULE, DELAYED RELEASE ORAL 2 TIMES DAILY
Status: DISCONTINUED | OUTPATIENT
Start: 2025-04-24 | End: 2025-04-26 | Stop reason: HOSPADM

## 2025-04-24 RX ORDER — GLYCOPYRROLATE 0.2 MG/ML
INJECTION INTRAMUSCULAR; INTRAVENOUS AS NEEDED
Status: DISCONTINUED | OUTPATIENT
Start: 2025-04-24 | End: 2025-04-24

## 2025-04-24 RX ORDER — FENTANYL CITRATE 50 UG/ML
25 INJECTION, SOLUTION INTRAMUSCULAR; INTRAVENOUS EVERY 5 MIN PRN
Status: DISCONTINUED | OUTPATIENT
Start: 2025-04-24 | End: 2025-04-24 | Stop reason: HOSPADM

## 2025-04-24 RX ORDER — SODIUM CHLORIDE, SODIUM LACTATE, POTASSIUM CHLORIDE, CALCIUM CHLORIDE 600; 310; 30; 20 MG/100ML; MG/100ML; MG/100ML; MG/100ML
INJECTION, SOLUTION INTRAVENOUS CONTINUOUS PRN
Status: DISCONTINUED | OUTPATIENT
Start: 2025-04-24 | End: 2025-04-24

## 2025-04-24 RX ORDER — LIDOCAINE HYDROCHLORIDE 20 MG/ML
INJECTION, SOLUTION EPIDURAL; INFILTRATION; INTRACAUDAL; PERINEURAL AS NEEDED
Status: DISCONTINUED | OUTPATIENT
Start: 2025-04-24 | End: 2025-04-24

## 2025-04-24 RX ORDER — TRANEXAMIC ACID 650 MG/1
1950 TABLET ORAL ONCE
Status: COMPLETED | OUTPATIENT
Start: 2025-04-24 | End: 2025-04-24

## 2025-04-24 RX ORDER — HYDROXYCHLOROQUINE SULFATE 200 MG/1
400 TABLET, FILM COATED ORAL DAILY
Status: DISCONTINUED | OUTPATIENT
Start: 2025-04-24 | End: 2025-04-26 | Stop reason: HOSPADM

## 2025-04-24 RX ORDER — NORETHINDRONE AND ETHINYL ESTRADIOL 0.5-0.035
KIT ORAL AS NEEDED
Status: DISCONTINUED | OUTPATIENT
Start: 2025-04-24 | End: 2025-04-24

## 2025-04-24 RX ORDER — OXYCODONE HYDROCHLORIDE 5 MG/1
5 TABLET ORAL EVERY 4 HOURS PRN
Status: DISCONTINUED | OUTPATIENT
Start: 2025-04-24 | End: 2025-04-24 | Stop reason: HOSPADM

## 2025-04-24 RX ORDER — NOREPINEPHRINE BITARTRATE/D5W 8 MG/250ML
0-1 PLASTIC BAG, INJECTION (ML) INTRAVENOUS CONTINUOUS
Status: DISCONTINUED | OUTPATIENT
Start: 2025-04-24 | End: 2025-04-25

## 2025-04-24 RX ORDER — TRANEXAMIC ACID 650 MG/1
1950 TABLET ORAL ONCE
Status: DISCONTINUED | OUTPATIENT
Start: 2025-04-24 | End: 2025-04-26 | Stop reason: HOSPADM

## 2025-04-24 RX ORDER — MORPHINE SULFATE 2 MG/ML
2 INJECTION, SOLUTION INTRAMUSCULAR; INTRAVENOUS EVERY 2 HOUR PRN
Status: DISCONTINUED | OUTPATIENT
Start: 2025-04-24 | End: 2025-04-26 | Stop reason: HOSPADM

## 2025-04-24 RX ORDER — ATORVASTATIN CALCIUM 10 MG/1
10 TABLET, FILM COATED ORAL DAILY
Status: DISCONTINUED | OUTPATIENT
Start: 2025-04-25 | End: 2025-04-26 | Stop reason: HOSPADM

## 2025-04-24 RX ORDER — ROCURONIUM BROMIDE 50 MG/5 ML
SYRINGE (ML) INTRAVENOUS AS NEEDED
Status: DISCONTINUED | OUTPATIENT
Start: 2025-04-24 | End: 2025-04-24

## 2025-04-24 RX ORDER — POLYETHYLENE GLYCOL 3350 17 G/17G
17 POWDER, FOR SOLUTION ORAL DAILY
Status: DISCONTINUED | OUTPATIENT
Start: 2025-04-24 | End: 2025-04-26 | Stop reason: HOSPADM

## 2025-04-24 RX ORDER — LABETALOL HYDROCHLORIDE 5 MG/ML
5 INJECTION, SOLUTION INTRAVENOUS ONCE AS NEEDED
Status: DISCONTINUED | OUTPATIENT
Start: 2025-04-24 | End: 2025-04-24 | Stop reason: HOSPADM

## 2025-04-24 RX ORDER — GABAPENTIN 300 MG/1
300 CAPSULE ORAL 2 TIMES DAILY
Status: DISCONTINUED | OUTPATIENT
Start: 2025-04-24 | End: 2025-04-26 | Stop reason: HOSPADM

## 2025-04-24 RX ORDER — DEXTROSE 50 % IN WATER (D50W) INTRAVENOUS SYRINGE
12.5
Status: DISCONTINUED | OUTPATIENT
Start: 2025-04-24 | End: 2025-04-26 | Stop reason: HOSPADM

## 2025-04-24 RX ORDER — AMLODIPINE BESYLATE 10 MG/1
10 TABLET ORAL DAILY
Status: DISCONTINUED | OUTPATIENT
Start: 2025-04-25 | End: 2025-04-26

## 2025-04-24 RX ORDER — PHENYLEPHRINE 10 MG/250 ML(40 MCG/ML)IN 0.9 % SOD.CHLORIDE INTRAVENOUS
CONTINUOUS PRN
Status: DISCONTINUED | OUTPATIENT
Start: 2025-04-24 | End: 2025-04-24

## 2025-04-24 RX ADMIN — EPHEDRINE SULFATE 20 MG: 50 INJECTION, SOLUTION INTRAVENOUS at 13:20

## 2025-04-24 RX ADMIN — CEFAZOLIN SODIUM 2 G: 2 INJECTION, SOLUTION INTRAVENOUS at 22:41

## 2025-04-24 RX ADMIN — ACETAMINOPHEN 650 MG: 325 TABLET ORAL at 22:30

## 2025-04-24 RX ADMIN — ACETAMINOPHEN 650 MG: 325 TABLET ORAL at 10:23

## 2025-04-24 RX ADMIN — HYDROMORPHONE HYDROCHLORIDE 0.5 MG: 1 INJECTION, SOLUTION INTRAMUSCULAR; INTRAVENOUS; SUBCUTANEOUS at 18:28

## 2025-04-24 RX ADMIN — GLYCOPYRROLATE 0.2 MG: 0.2 INJECTION, SOLUTION INTRAMUSCULAR; INTRAVENOUS at 12:58

## 2025-04-24 RX ADMIN — HYDROMORPHONE HYDROCHLORIDE 0.5 MG: 1 INJECTION, SOLUTION INTRAMUSCULAR; INTRAVENOUS; SUBCUTANEOUS at 16:35

## 2025-04-24 RX ADMIN — SODIUM CHLORIDE: 9 INJECTION, SOLUTION INTRAVENOUS at 13:06

## 2025-04-24 RX ADMIN — DEXAMETHASONE SODIUM PHOSPHATE 4 MG: 4 INJECTION, SOLUTION INTRAMUSCULAR; INTRAVENOUS at 12:45

## 2025-04-24 RX ADMIN — Medication 100 MG: at 12:39

## 2025-04-24 RX ADMIN — PHENYLEPHRINE HYDROCHLORIDE 200 MCG: 10 INJECTION INTRAVENOUS at 13:02

## 2025-04-24 RX ADMIN — SODIUM CHLORIDE, POTASSIUM CHLORIDE, SODIUM LACTATE AND CALCIUM CHLORIDE: 600; 310; 30; 20 INJECTION, SOLUTION INTRAVENOUS at 12:28

## 2025-04-24 RX ADMIN — PROPOFOL 100 MCG/KG/MIN: 10 INJECTION, EMULSION INTRAVENOUS at 12:45

## 2025-04-24 RX ADMIN — PHENYLEPHRINE HYDROCHLORIDE 300 MCG: 10 INJECTION INTRAVENOUS at 12:40

## 2025-04-24 RX ADMIN — HYDROMORPHONE HYDROCHLORIDE 0.5 MG: 1 INJECTION, SOLUTION INTRAMUSCULAR; INTRAVENOUS; SUBCUTANEOUS at 17:48

## 2025-04-24 RX ADMIN — ONDANSETRON 4 MG: 2 INJECTION INTRAMUSCULAR; INTRAVENOUS at 15:23

## 2025-04-24 RX ADMIN — HYDROMORPHONE HYDROCHLORIDE 0.5 MG: 1 INJECTION, SOLUTION INTRAMUSCULAR; INTRAVENOUS; SUBCUTANEOUS at 16:17

## 2025-04-24 RX ADMIN — MIDAZOLAM 2 MG: 1 INJECTION INTRAMUSCULAR; INTRAVENOUS at 12:28

## 2025-04-24 RX ADMIN — Medication 50 MG: at 13:25

## 2025-04-24 RX ADMIN — FENTANYL CITRATE 50 MCG: 50 INJECTION, SOLUTION INTRAMUSCULAR; INTRAVENOUS at 12:38

## 2025-04-24 RX ADMIN — ATROPINE SULFATE 0.2 MG: 0.4 INJECTION, SOLUTION INTRAVENOUS at 13:00

## 2025-04-24 RX ADMIN — FENTANYL CITRATE 25 MCG: 50 INJECTION, SOLUTION INTRAMUSCULAR; INTRAVENOUS at 16:58

## 2025-04-24 RX ADMIN — CELECOXIB 200 MG: 200 CAPSULE ORAL at 10:23

## 2025-04-24 RX ADMIN — FENTANYL CITRATE 25 MCG: 50 INJECTION, SOLUTION INTRAMUSCULAR; INTRAVENOUS at 15:28

## 2025-04-24 RX ADMIN — EPHEDRINE SULFATE 30 MG: 50 INJECTION, SOLUTION INTRAVENOUS at 12:55

## 2025-04-24 RX ADMIN — DIPHENHYDRAMINE HYDROCHLORIDE 50 MG: 50 INJECTION, SOLUTION INTRAMUSCULAR; INTRAVENOUS at 12:45

## 2025-04-24 RX ADMIN — Medication 3 L/MIN: at 17:00

## 2025-04-24 RX ADMIN — PROPOFOL 150 MG: 10 INJECTION, EMULSION INTRAVENOUS at 12:38

## 2025-04-24 RX ADMIN — PHENYLEPHRINE HYDROCHLORIDE 200 MCG: 10 INJECTION INTRAVENOUS at 12:52

## 2025-04-24 RX ADMIN — SUGAMMADEX 200 MG: 100 INJECTION, SOLUTION INTRAVENOUS at 15:28

## 2025-04-24 RX ADMIN — PHENYLEPHRINE HYDROCHLORIDE 200 MCG: 10 INJECTION INTRAVENOUS at 13:11

## 2025-04-24 RX ADMIN — GABAPENTIN 300 MG: 300 CAPSULE ORAL at 21:45

## 2025-04-24 RX ADMIN — POVIDONE-IODINE 1 APPLICATION: 5 SOLUTION TOPICAL at 10:22

## 2025-04-24 RX ADMIN — GLYCOPYRROLATE 0.2 MG: 0.2 INJECTION, SOLUTION INTRAMUSCULAR; INTRAVENOUS at 13:19

## 2025-04-24 RX ADMIN — LIDOCAINE HYDROCHLORIDE 100 MG: 20 INJECTION, SOLUTION EPIDURAL; INFILTRATION; INTRACAUDAL; PERINEURAL at 12:38

## 2025-04-24 RX ADMIN — Medication 0.4 MCG/KG/MIN: at 12:43

## 2025-04-24 RX ADMIN — REMIFENTANIL HYDROCHLORIDE 0.05 MCG/KG/MIN: 1 INJECTION, POWDER, LYOPHILIZED, FOR SOLUTION INTRAVENOUS at 12:45

## 2025-04-24 RX ADMIN — SODIUM CHLORIDE, POTASSIUM CHLORIDE, SODIUM LACTATE AND CALCIUM CHLORIDE: 600; 310; 30; 20 INJECTION, SOLUTION INTRAVENOUS at 13:31

## 2025-04-24 RX ADMIN — FAMOTIDINE 20 MG: 10 INJECTION, SOLUTION INTRAVENOUS at 12:45

## 2025-04-24 RX ADMIN — ONDANSETRON 4 MG: 2 INJECTION INTRAMUSCULAR; INTRAVENOUS at 17:48

## 2025-04-24 RX ADMIN — PHENYLEPHRINE HYDROCHLORIDE 200 MCG: 10 INJECTION INTRAVENOUS at 12:47

## 2025-04-24 RX ADMIN — CEFAZOLIN SODIUM 2 G: 2 INJECTION, SOLUTION INTRAVENOUS at 13:06

## 2025-04-24 RX ADMIN — TRANEXAMIC ACID 1950 MG: 650 TABLET ORAL at 10:22

## 2025-04-24 SDOH — SOCIAL STABILITY: SOCIAL INSECURITY: DOES ANYONE TRY TO KEEP YOU FROM HAVING/CONTACTING OTHER FRIENDS OR DOING THINGS OUTSIDE YOUR HOME?: NO

## 2025-04-24 SDOH — SOCIAL STABILITY: SOCIAL INSECURITY: DO YOU FEEL ANYONE HAS EXPLOITED OR TAKEN ADVANTAGE OF YOU FINANCIALLY OR OF YOUR PERSONAL PROPERTY?: NO

## 2025-04-24 SDOH — HEALTH STABILITY: PHYSICAL HEALTH: ON AVERAGE, HOW MANY MINUTES DO YOU ENGAGE IN EXERCISE AT THIS LEVEL?: 120 MIN

## 2025-04-24 SDOH — SOCIAL STABILITY: SOCIAL INSECURITY: WERE YOU ABLE TO COMPLETE ALL THE BEHAVIORAL HEALTH SCREENINGS?: YES

## 2025-04-24 SDOH — ECONOMIC STABILITY: INCOME INSECURITY: IN THE PAST 12 MONTHS HAS THE ELECTRIC, GAS, OIL, OR WATER COMPANY THREATENED TO SHUT OFF SERVICES IN YOUR HOME?: NO

## 2025-04-24 SDOH — HEALTH STABILITY: PHYSICAL HEALTH
HOW OFTEN DO YOU NEED TO HAVE SOMEONE HELP YOU WHEN YOU READ INSTRUCTIONS, PAMPHLETS, OR OTHER WRITTEN MATERIAL FROM YOUR DOCTOR OR PHARMACY?: NEVER

## 2025-04-24 SDOH — HEALTH STABILITY: PHYSICAL HEALTH: ON AVERAGE, HOW MANY DAYS PER WEEK DO YOU ENGAGE IN MODERATE TO STRENUOUS EXERCISE (LIKE A BRISK WALK)?: 5 DAYS

## 2025-04-24 SDOH — HEALTH STABILITY: MENTAL HEALTH: CURRENT SMOKER: 0

## 2025-04-24 SDOH — SOCIAL STABILITY: SOCIAL INSECURITY: ARE THERE ANY APPARENT SIGNS OF INJURIES/BEHAVIORS THAT COULD BE RELATED TO ABUSE/NEGLECT?: NO

## 2025-04-24 SDOH — SOCIAL STABILITY: SOCIAL INSECURITY: DO YOU FEEL UNSAFE GOING BACK TO THE PLACE WHERE YOU ARE LIVING?: NO

## 2025-04-24 SDOH — SOCIAL STABILITY: SOCIAL INSECURITY: ABUSE: ADULT

## 2025-04-24 SDOH — SOCIAL STABILITY: SOCIAL INSECURITY: HAS ANYONE EVER THREATENED TO HURT YOUR FAMILY OR YOUR PETS?: NO

## 2025-04-24 SDOH — SOCIAL STABILITY: SOCIAL INSECURITY: HAVE YOU HAD THOUGHTS OF HARMING ANYONE ELSE?: NO

## 2025-04-24 SDOH — SOCIAL STABILITY: SOCIAL INSECURITY: HAVE YOU HAD ANY THOUGHTS OF HARMING ANYONE ELSE?: NO

## 2025-04-24 SDOH — SOCIAL STABILITY: SOCIAL INSECURITY: ARE YOU OR HAVE YOU BEEN THREATENED OR ABUSED PHYSICALLY, EMOTIONALLY, OR SEXUALLY BY ANYONE?: NO

## 2025-04-24 ASSESSMENT — PAIN - FUNCTIONAL ASSESSMENT
PAIN_FUNCTIONAL_ASSESSMENT: UNABLE TO SELF-REPORT
PAIN_FUNCTIONAL_ASSESSMENT: 0-10
PAIN_FUNCTIONAL_ASSESSMENT: UNABLE TO SELF-REPORT
PAIN_FUNCTIONAL_ASSESSMENT: 0-10
PAIN_FUNCTIONAL_ASSESSMENT: 0-10
PAIN_FUNCTIONAL_ASSESSMENT: UNABLE TO SELF-REPORT
PAIN_FUNCTIONAL_ASSESSMENT: 0-10

## 2025-04-24 ASSESSMENT — PAIN DESCRIPTION - DESCRIPTORS
DESCRIPTORS: ACHING
DESCRIPTORS: SHARP;SHOOTING
DESCRIPTORS: ACHING

## 2025-04-24 ASSESSMENT — ACTIVITIES OF DAILY LIVING (ADL)
PATIENT'S MEMORY ADEQUATE TO SAFELY COMPLETE DAILY ACTIVITIES?: YES
HEARING - RIGHT EAR: HEARING AID
TOILETING: INDEPENDENT
FEEDING YOURSELF: INDEPENDENT
BATHING: INDEPENDENT
ADEQUATE_TO_COMPLETE_ADL: YES
HEARING - LEFT EAR: HEARING AID
GROOMING: INDEPENDENT
WALKS IN HOME: NEEDS ASSISTANCE
LACK_OF_TRANSPORTATION: NO
DRESSING YOURSELF: INDEPENDENT
JUDGMENT_ADEQUATE_SAFELY_COMPLETE_DAILY_ACTIVITIES: YES

## 2025-04-24 ASSESSMENT — PATIENT HEALTH QUESTIONNAIRE - PHQ9
1. LITTLE INTEREST OR PLEASURE IN DOING THINGS: NOT AT ALL
2. FEELING DOWN, DEPRESSED OR HOPELESS: NOT AT ALL
SUM OF ALL RESPONSES TO PHQ9 QUESTIONS 1 & 2: 0

## 2025-04-24 ASSESSMENT — COGNITIVE AND FUNCTIONAL STATUS - GENERAL
STANDING UP FROM CHAIR USING ARMS: A LITTLE
TURNING FROM BACK TO SIDE WHILE IN FLAT BAD: A LITTLE
MOVING FROM LYING ON BACK TO SITTING ON SIDE OF FLAT BED WITH BEDRAILS: A LITTLE
MOBILITY SCORE: 17
HELP NEEDED FOR BATHING: A LITTLE
DRESSING REGULAR UPPER BODY CLOTHING: A LITTLE
PATIENT BASELINE BEDBOUND: UNABLE TO ASSESS AT THIS TIME
WALKING IN HOSPITAL ROOM: A LITTLE
MOVING TO AND FROM BED TO CHAIR: A LITTLE
CLIMB 3 TO 5 STEPS WITH RAILING: A LOT
DAILY ACTIVITIY SCORE: 21
DRESSING REGULAR LOWER BODY CLOTHING: A LITTLE

## 2025-04-24 ASSESSMENT — PAIN SCALES - GENERAL
PAINLEVEL_OUTOF10: 5 - MODERATE PAIN
PAINLEVEL_OUTOF10: 6
PAINLEVEL_OUTOF10: 9
PAINLEVEL_OUTOF10: 5 - MODERATE PAIN
PAINLEVEL_OUTOF10: 5 - MODERATE PAIN
PAINLEVEL_OUTOF10: 8
PAINLEVEL_OUTOF10: 5 - MODERATE PAIN
PAINLEVEL_OUTOF10: 2
PAINLEVEL_OUTOF10: 7
PAINLEVEL_OUTOF10: 6
PAINLEVEL_OUTOF10: 7
PAINLEVEL_OUTOF10: 4
PAINLEVEL_OUTOF10: 5 - MODERATE PAIN
PAINLEVEL_OUTOF10: 5 - MODERATE PAIN

## 2025-04-24 ASSESSMENT — PAIN DESCRIPTION - ORIENTATION: ORIENTATION: RIGHT;LEFT

## 2025-04-24 ASSESSMENT — PAIN DESCRIPTION - LOCATION
LOCATION: BACK
LOCATION: BACK

## 2025-04-24 ASSESSMENT — COLUMBIA-SUICIDE SEVERITY RATING SCALE - C-SSRS
2. HAVE YOU ACTUALLY HAD ANY THOUGHTS OF KILLING YOURSELF?: NO
6. HAVE YOU EVER DONE ANYTHING, STARTED TO DO ANYTHING, OR PREPARED TO DO ANYTHING TO END YOUR LIFE?: NO
1. IN THE PAST MONTH, HAVE YOU WISHED YOU WERE DEAD OR WISHED YOU COULD GO TO SLEEP AND NOT WAKE UP?: NO

## 2025-04-24 ASSESSMENT — LIFESTYLE VARIABLES
AUDIT-C TOTAL SCORE: 0
SKIP TO QUESTIONS 9-10: 1
HOW OFTEN DO YOU HAVE 6 OR MORE DRINKS ON ONE OCCASION: NEVER
AUDIT-C TOTAL SCORE: 0
HOW OFTEN DO YOU HAVE A DRINK CONTAINING ALCOHOL: NEVER
SUBSTANCE_ABUSE_PAST_12_MONTHS: NO
PRESCIPTION_ABUSE_PAST_12_MONTHS: NO
HOW MANY STANDARD DRINKS CONTAINING ALCOHOL DO YOU HAVE ON A TYPICAL DAY: PATIENT DOES NOT DRINK

## 2025-04-24 NOTE — ANESTHESIA PROCEDURE NOTES
Airway  Date/Time: 4/24/2025 12:41 PM  Reason: elective    Airway not difficult    Staffing  Performed: MELISSA   Authorized by: Amauri Cherry DO    Performed by: MELISSA Garibay  Patient location during procedure: OR    Patient Condition  Indications for airway management: anesthesia  Patient position: sniffing  Sedation level: deep     Final Airway Details   Preoxygenated: yes  Final airway type: endotracheal airway  Successful airway: ETT   Successful intubation technique: video laryngoscopy (mireles 4)  Adjuncts used in placement: intubating stylet  Blade: Tess  Blade size: #4  ETT size (mm): 7.5  Cormack-Lehane Classification: grade I - full view of glottis  Placement verified by: chest auscultation and capnometry   Measured from: lips  ETT to lips (cm): 24  Number of attempts at approach: 1

## 2025-04-24 NOTE — ANESTHESIA PROCEDURE NOTES
Arterial Line:    Date/Time: 4/24/2025 12:52 PM    Staffing  Performed: MELISSA and attending   Authorized by: Amauri Cherry DO    Performed by: MELISSA Garibay    An arterial line was placed. in the OR for the following indication(s): continuous blood pressure monitoring.    A 20 gauge (size) (length), Arrow (type) catheter was placed into the Right radial artery, secured by Tegaderm,   Seldinger technique used.  Events:  patient tolerated procedure well with no complications.

## 2025-04-24 NOTE — OP NOTE
Preoperative diagnosis: L3-4 right extruded cephalad directed herniated disc heading up to the L2-3 level.  Lumbar spondylosis.    Postop diagnosis: Same    Procedure: 1) L3-4 extruded right microdiscectomy 2) L3-4 posterior lumbar interbody fusion  3) L3-4 posterior lateral fusion 4) L3-4 instrumentation 5) L3-4 interbody cage  6) Thermal ablation of the medial nerve branch supplying the facets at L3-4 on the right.                 Surgeon: Raleigh Lange M.D.    Asst.: Marcel RUIZ The physician assistant was present through the entire case. Given the nature of the disease process and the procedure to be performed a skilled surgical assistant was necessary during the case. The assistant was necessary in order to hold retractors and directly assist in the operation. A certified scrub tech was at the back table managing instruments and supplies for the surgical case.    Anesthesia: General    Blood Loss: 100 cc    Complications: None    HPI: The patient had pain from the above-described condition. The patient failed all nonoperative treatment. All of the risks, benefits, and potential complications for operative and nonoperative treatment were discussed at length with the patient. Risks of operative intervention include, but are not limited to: Anesthesia complications, excessive blood loss, infection, damaged to uninjured structures including, but not limited to, the dural sac and nerve roots, blood clots, and lack of improvement or worsening of symptoms. These complications could result in death, permanent disability, or need for reoperation. The patient completely understood those risks and wished to proceed with operative intervention.    Operative implants: Nuvasive Reline screws, Globus Rise cage size 10 mm wide 26 mm long 7 mm high, Actifuse gun bone graft.    Operative procedure: The patient was wheeled from the preanesthesia care unit to the theater. The patient was placed in supine position and all  bony prominences were well-padded. For the entire procedure anesthesia maintainined control of the patient's head neck. General anesthesia was induced. The patient was then placed prone on the Davian table. All bony prominences were well-padded. X-rays were then taken to confirm appropriate visualization of the operative level in AP and lateral projections.. Usingl fluoroscopic imaging the cephalad and caudal pedicles were marked on the patient's skin. The incisions were then marked. Next, the back was prepped and draped in normal sterile fashion.    Timeout was performed, site verification was verified, and appropriate antibiotic administration was confirmed. A longitudinal incision was then performed off to the left side of the marke pedicles. Dissection was carried down through the skin with a knife and Bovie was used to dissect down  the fat and the fascia. Blunt dissection was taken down to the transverse processes and facet on the left side. Next, the identical procedure was performed on the right side.    Using AP and lateral fluoroscopic imaging and Pediguard, an and intrapedicular approach was performed.  Using fluoroscopic imaging to to ensure that there was no breech of the pedicle, the trocar was brought down just through the posterior vertebral body wall of the right cephalad pedicle.  This was done by starting at the 3 o'clock position. The guidewire was docked into the vertebral body. Next the identical procedure was performed on the left  starting at the 9 o'clock position. In the identical procedure was then performed bilaterally at the caudal level for both pedicles. The wires were covered with suction tubing and appropriately stored. Appropriate pedicle guard readings were obtained for all 4 wires.    Using lateral x-ray, dilators were placed on the patient's right side over the L3-4 level facet and lamina. Dilators were dilated up to a size 22 and a permanent 26 mm tube was docked into  appropriate position visualized on AP and lateral projections. The microscope was then brought in for use. Bovie was used to remove a small amount of fat and muscle overlying the facet and lamina.  A Bovie was then used to perform thermal ablation at that level using direct visualization to confirm the thermal ablation was performed in the area of the medial sensory nerve branch to that facet.  A bur was then used to debulk the lamina as well as the facet.  A curved curet was used to enter under the lamina.  2 and 3 Kerrisons were used to perform a laminectomy taking off the ligamentum flavum and decompressing the patient's lateral shoulder of the dural sac, the exiting nerve root, and the traversing nerve root.  Laminectomy was then carried up cephalad to the L3 nerve root.  This approach and procedure of this portion of the surgery was a distinct and separate approach and procedure as was needed to place the interbody cage.  Via this approach the entire L3 nerve root to be seen branching off of the dural sac and going around the L3 nerve root.  In the axilla a large nerve root was encountered and it was removed with a short ball and micropituitary in its entirety.  At this point a long ball could be swung along the course of the L3 nerve root without any evidence of free disc fragment or impingement.  At this point there was complete decompression of the exiting and traversing nerve roots bilaterally as well as centrally.  The dural sac and nerve roots were pulsating freely    The laminectomy was then widened laterally in order to be able to provide room to place the interbody cage.  It was necessary to place an interbody cage as the facet and pars had been substantially violated in order to perform the above described microdiscectomy.  This laminectomy for placement of the interbody cage was a distinct and separate procedure from the above described microdiscectomy.  3 Kerrisons and a bur was used to take down  the facet completely and laterally to see the exiting nerve root and its far lateral region.  A nerve root retractor was used to gently retract and protect the dural sac and nerve roots.  This nicely exposed the disc. Bipolar cautery and FloSeal was used to maintain hemostasis. The disc was box cut and the annulus was removed with the pituitary rongeur. Pituitaries were then used to remove the nucleus inside the disc. Rotating brenda were then used. Curved curettes were used to the right into the left to remove disc off of the endplate all the way to the contralateral side and down to the annulus anteriorly. Upbiters and backbiters were used to remove disc as well. At this point there was complete disc removal down to the endplates and the annulus.  The disc was irrigated copiously and suctioned free of all debris.    The Actifuse gun was then placed the disc space the disc space was filled with graft.  The cage was then placed down into the disc space visualized in AP and lateral projections to be appropriately positioned. It was expanded until appropriate tension was achieved and disc height restoration was achieved as visualized on AP and lateral fluoroscopic views. Using direct visualization, the insertion handle was removed and AP lateral x-rays confirmed appropriate positioning of the cage. Using the microscope for visualization a long ball was swung underneath the exiting nerve root and dural sac and traversing nerve root to ensure there was no impingement from extruded disc or bone graft.  Valsalva was performed was no evidence of any dural leak or excessive bleeding. Hemostasis was maintained for the entire case using FloSeal, however no FloSeal was left in the patient. The wound was irrigated copiously and the tube was removed under direct visualization.    Screws were then placed into all 4 pedicles at the L3-4 level by first measuring the length of the screw, tapping, using spinal cord monitoring to  measure off the tap and obtaining appropriate reading, and then placing the screw. Wires were removed after the screw was into the vertebral body but not completely seated. At this point all 4 screws were placed and the rods were passed using the passing gun. The rods were locked into position using the locking caps which were torqued appropriately. Insertion handles were removed and final AP lateral x-rays of the entire construct confirmed appropriate positioning of the screws, rods and cage.  Next, the Actifuse gun was used to place bone graft from L3 transverse process to the L4 transverse process.  Morselized bone graft that was prepared from local lamina and facet was also placed in the lateral gutter.  This bone graft completely filled the posterior lateral gutter to provide a nice fusion from transverse process to transverse process along the lateral portion of the facets.      The fascia for both incisions was closed with a running 0 Vicryl suture. The fatty layer was closed with 0 Vicryl the skin was closed with 2-0 Vicryl, 4-0 Monocryl, and Steri-Strips. A sterile dressing was applied. At the conclusion of the case the patient's toes were pink and warm with brisk capillary refill. The patient tolerated the procedure well. There were no EMG changes for the entire case. Appropriate spinal cord monitor readings were obtained for all pedicle screws.          This dictation was created using voice recognition software. If there are any questions about inaccuracies please do not hesitate to contact me.

## 2025-04-24 NOTE — CONSULTS
"Reason For Consult  Medical management    History Of Present Illness  Toribio Villatoro is a 59 y.o. male presenting to the hospital for elective lumbar spine surgery. Patient is s/p 1) L3-4 extruded right microdiscectomy 2) L3-4 posterior lumbar interbody fusion 3) L3-4 posterior lateral fusion 4) L3-4 instrumentation 5) L3-4 interbody cage 6) Thermal ablation of the medial nerve branch supplying the facets at L3-4 on the right today with Dr. Lange earlier today. He was seen in the pacu postop. Per patient report, he initially presented to the emergency room on 4/2 due to right leg giving out.  Workup did not show any acute findings and she was discharged as outpatient to follow-up with orthopedics.  Was seen by Dr. Lange, MRI was done, \"MRI was reviewed and shows a disc herniation on the right which heads up in the cephalad direction and contacts the L3 nerve root in the foramen. There may be some contact with the L4 nerve root as well but this appears to be more of an L3 nerve root issue. \"  Decision was made to proceed with surgical intervention.     He is past medical history is significant for dilated nonischemic cardiomyopathy with a EF of 15 to 20%, chronic systolic congestive heart failure, hypertension, LVH, hyperlipidemia, diabetes, CKD, obstructive sleep apnea on CPAP, obesity, rheumatoid arthritis. He is being followed closely by his PCP Dr. Quiroz, his cardiologist DR. Modi, and his nephrologist DR. Molina. Patient reports he is taking all his prescribed medications appropriately without skipping doses.         Past Medical History  He has a past medical history of Allergic rhinitis due to pollen (05/16/2019), Cardiomegaly (10/12/2022), CHF (congestive heart failure), Chronic kidney disease, Diabetes mellitus (Multi), Diabetic nephropathy (Multi), Dilated idiopathic cardiomyopathy (Multi) (02/11/2023), Disorder of white blood cells, unspecified (06/23/2021), Generalized hyperhidrosis, GERD " "(gastroesophageal reflux disease), Hyperlipidemia, Hypertension, Hypothyroidism, Lumbar disc disease, Myocardial infarction (Multi), Myocarditis (02/11/2023), NICM (nonischemic cardiomyopathy) (Multi) (02/11/2023), Other cardiomyopathies (10/12/2022), Other hammer toe(s) (acquired), right foot (04/12/2022), Personal history of other benign neoplasm (05/20/2020), Personal history of other diseases of the circulatory system (01/13/2022), Personal history of other specified conditions (06/23/2021), Rheumatoid arthritis, Sleep apnea, and Type 2 diabetes mellitus.    Surgical History  He has a past surgical history that includes Other surgical history (Right, 02/16/2022); Other surgical history (02/16/2022); Other surgical history; Colonoscopy; and Upper gastrointestinal endoscopy.     Social History  He reports that he has never smoked. He has never been exposed to tobacco smoke. He has never used smokeless tobacco. He reports current alcohol use. He reports that he does not use drugs.    Family History  Family History[1]     Allergies  Lisinopril    Review of Systems  ROS: 12 systems reviewed and negative except per HPI above     Physical Exam  Constitutional: Well developed, awake/alert/oriented x3, no distress, alert and cooperative, obese   Eyes: PERRL, EOMI, clear sclera  ENMT: mucous membranes moist  Head/Neck: Neck supple  Respiratory/Thorax: CTA b/l.   Cardiovascular: Regular, rate and rhythm, no murmurs  Gastrointestinal: Nondistended, soft, non-tender  Musculoskeletal: ROM limited in lower extremities due to postop spinal surgery. Sensory deficit + in the RLE from previous accident. Back examination deferred since patient is recently postop.   Extremities: no edema     Last Recorded Vitals  Blood pressure 100/65, pulse 88, temperature 36.5 °C (97.7 °F), temperature source Temporal, resp. rate 13, height 1.753 m (5' 9\"), weight 111 kg (245 lb), SpO2 95%.    Relevant Results       Assessment/Plan   Assessment & " Plan  Back pain of lumbar region with sciatica    Other intervertebral disc displacement, lumbar region    Spondylolisthesis, lumbar region    Acquired hypothyroidism    Chronic systolic (congestive) heart failure    Diabetic nephropathy (Multi)    Dilated idiopathic cardiomyopathy (Multi)    HTN (hypertension), benign    Hyperlipemia, mixed    Lumbar radiculopathy    LVH (left ventricular hypertrophy)    Obstructive sleep apnea, adult    Rheumatoid arthritis    Type 2 diabetes mellitus with stage 3a chronic kidney disease, without long-term current use of insulin (Multi)        Plan  -Defer pain management and DVT prophylaxis to orthopedic team  - Home medication reviewed, will hold off on antihypertensives due to patient's current borderline low blood pressure  -Per report, patient had a prolonged hypotension during the procedure requiring pressor support for 1 hour during the surgery.  -Monitor blood pressure closely  -Cardiology has been consulted by primary team  -Continue sliding scale to monitor glucose, adjust accordingly  -PT OT consult  -Will check CBC, BMP, magnesium in the morning  -Appreciate consultation, will follow      I spent 65 minutes in the professional and overall care of this patient.      This dictation was created using voice recognition software.  If there are any questions about inaccuracies please do not hesitate to contact me.      Kun Alicea MD         [1]   Family History  Problem Relation Name Age of Onset    Heart attack Mother Juanita         acute    Breast cancer Mother Juanita     Mastectomy Mother Juanita     Cancer Father Jl     Heart attack Brother      Heart disease Maternal Grandmother      Parkinsonism Maternal Grandfather      Diabetes Paternal Grandfather      Heart attack Paternal Grandfather

## 2025-04-24 NOTE — ANESTHESIA PREPROCEDURE EVALUATION
Patient: Toribio Villatoro    Procedure Information       Anesthesia Start Date/Time: 25 1229    Procedure: L3-4 RIGHT MICRODISCECTOMY, INSTRUMENTATION, POSTERIOR INTERBODY FUSION, POSTERIOR LATERAL FUSION (Right: Spine Lumbar)    Location: [facility] / [facility]    Surgeons: Raleigh Lange MD            Relevant Problems   Cardiac   (+) HTN (hypertension), benign   (+) Hyperlipemia, mixed      Neuro   (+) Back pain of lumbar region with sciatica   (+) Cervical radiculopathy   (+) Lumbar radiculopathy   (+) PN (peripheral neuropathy)      GI   (+) GERD (gastroesophageal reflux disease)   (+) Rectal hemorrhage      Endocrine   (+) Acquired hypothyroidism   (+) Diabetic nephropathy (Multi)   (+) Type 2 diabetes mellitus with stage 3a chronic kidney disease, without long-term current use of insulin (Multi)      Hematology   (+) Anemia, unspecified   (+) Blood loss anemia      Musculoskeletal   (+) Other intervertebral disc displacement, lumbar region   (+) Rheumatoid arthritis      ID   (+) Nail fungus       Clinical information reviewed:    Allergies  Meds               NPO Detail:  NPO/Void Status  Date of Last Liquid: 25  Time of Last Liquid:   Date of Last Solid: 25  Time of Last Solid:          Physical Exam    Airway  Mallampati: III  TM distance: >3 FB  Mouth openin finger widths     Cardiovascular - normal exam  Rhythm: regular  Rate: normal     Dental - normal exam     Pulmonary - normal exam   Abdominal - normal examAbdomen: soft           Anesthesia Plan    History of general anesthesia?: yes  History of complications of general anesthesia?: no    ASA 3     general     The patient is not a current smoker.  Patient was previously instructed to abstain from smoking on day of procedure.  Patient did not smoke on day of procedure.  Education provided regarding risk of obstructive sleep apnea.  intravenous induction   Postoperative pain plan includes opioids.

## 2025-04-25 ENCOUNTER — APPOINTMENT (OUTPATIENT)
Dept: CARDIOLOGY | Facility: HOSPITAL | Age: 60
End: 2025-04-25
Payer: COMMERCIAL

## 2025-04-25 LAB
ANION GAP SERPL CALC-SCNC: 12 MMOL/L (ref 10–20)
ANION GAP SERPL CALC-SCNC: 13 MMOL/L (ref 10–20)
AORTIC VALVE PEAK VELOCITY: 1.48 M/S
ATRIAL RATE: 93 BPM
AV PEAK GRADIENT: 9 MMHG
AVA (PEAK VEL): 3.85 CM2
BUN SERPL-MCNC: 27 MG/DL (ref 6–23)
BUN SERPL-MCNC: 28 MG/DL (ref 6–23)
CALCIUM SERPL-MCNC: 8.5 MG/DL (ref 8.6–10.3)
CALCIUM SERPL-MCNC: 8.7 MG/DL (ref 8.6–10.3)
CHLORIDE SERPL-SCNC: 103 MMOL/L (ref 98–107)
CHLORIDE SERPL-SCNC: 104 MMOL/L (ref 98–107)
CO2 SERPL-SCNC: 22 MMOL/L (ref 21–32)
CO2 SERPL-SCNC: 27 MMOL/L (ref 21–32)
CREAT SERPL-MCNC: 1.46 MG/DL (ref 0.5–1.3)
CREAT SERPL-MCNC: 1.6 MG/DL (ref 0.5–1.3)
EGFRCR SERPLBLD CKD-EPI 2021: 49 ML/MIN/1.73M*2
EGFRCR SERPLBLD CKD-EPI 2021: 54 ML/MIN/1.73M*2
EJECTION FRACTION APICAL 4 CHAMBER: 56.7
EJECTION FRACTION: 55 %
ERYTHROCYTE [DISTWIDTH] IN BLOOD BY AUTOMATED COUNT: 14.1 % (ref 11.5–14.5)
GLUCOSE BLD MANUAL STRIP-MCNC: 180 MG/DL (ref 74–99)
GLUCOSE BLD MANUAL STRIP-MCNC: 180 MG/DL (ref 74–99)
GLUCOSE BLD MANUAL STRIP-MCNC: 189 MG/DL (ref 74–99)
GLUCOSE BLD MANUAL STRIP-MCNC: 208 MG/DL (ref 74–99)
GLUCOSE SERPL-MCNC: 179 MG/DL (ref 74–99)
GLUCOSE SERPL-MCNC: 252 MG/DL (ref 74–99)
HCT VFR BLD AUTO: 41 % (ref 41–52)
HGB BLD-MCNC: 13.1 G/DL (ref 13.5–17.5)
LEFT VENTRICLE INTERNAL DIMENSION DIASTOLE: 5.9 CM (ref 3.5–6)
LEFT VENTRICULAR OUTFLOW TRACT DIAMETER: 2.4 CM
LV EJECTION FRACTION BIPLANE: 53 %
MAGNESIUM SERPL-MCNC: 1.92 MG/DL (ref 1.6–2.4)
MCH RBC QN AUTO: 32.1 PG (ref 26–34)
MCHC RBC AUTO-ENTMCNC: 32 G/DL (ref 32–36)
MCV RBC AUTO: 101 FL (ref 80–100)
MITRAL VALVE E/A RATIO: 0.72
NRBC BLD-RTO: 0 /100 WBCS (ref 0–0)
P AXIS: 56 DEGREES
P OFFSET: 190 MS
P ONSET: 129 MS
PLATELET # BLD AUTO: 253 X10*3/UL (ref 150–450)
POTASSIUM SERPL-SCNC: 4.6 MMOL/L (ref 3.5–5.3)
POTASSIUM SERPL-SCNC: 5.2 MMOL/L (ref 3.5–5.3)
PR INTERVAL: 174 MS
Q ONSET: 216 MS
QRS COUNT: 15 BEATS
QRS DURATION: 100 MS
QT INTERVAL: 364 MS
QTC CALCULATION(BAZETT): 452 MS
QTC FREDERICIA: 421 MS
R AXIS: 13 DEGREES
RBC # BLD AUTO: 4.08 X10*6/UL (ref 4.5–5.9)
RIGHT VENTRICLE FREE WALL PEAK S': 15.8 CM/S
SODIUM SERPL-SCNC: 134 MMOL/L (ref 136–145)
SODIUM SERPL-SCNC: 137 MMOL/L (ref 136–145)
T AXIS: -18 DEGREES
T OFFSET: 398 MS
TRICUSPID ANNULAR PLANE SYSTOLIC EXCURSION: 2.2 CM
VENTRICULAR RATE: 93 BPM
WBC # BLD AUTO: 9.2 X10*3/UL (ref 4.4–11.3)

## 2025-04-25 PROCEDURE — 2500000001 HC RX 250 WO HCPCS SELF ADMINISTERED DRUGS (ALT 637 FOR MEDICARE OP): Performed by: STUDENT IN AN ORGANIZED HEALTH CARE EDUCATION/TRAINING PROGRAM

## 2025-04-25 PROCEDURE — 2500000001 HC RX 250 WO HCPCS SELF ADMINISTERED DRUGS (ALT 637 FOR MEDICARE OP): Performed by: NURSE PRACTITIONER

## 2025-04-25 PROCEDURE — 82947 ASSAY GLUCOSE BLOOD QUANT: CPT

## 2025-04-25 PROCEDURE — 2500000004 HC RX 250 GENERAL PHARMACY W/ HCPCS (ALT 636 FOR OP/ED): Performed by: PHYSICIAN ASSISTANT

## 2025-04-25 PROCEDURE — 7100000011 HC EXTENDED STAY RECOVERY HOURLY - NURSING UNIT

## 2025-04-25 PROCEDURE — 93306 TTE W/DOPPLER COMPLETE: CPT

## 2025-04-25 PROCEDURE — 99231 SBSQ HOSP IP/OBS SF/LOW 25: CPT | Performed by: PHYSICIAN ASSISTANT

## 2025-04-25 PROCEDURE — 93005 ELECTROCARDIOGRAM TRACING: CPT

## 2025-04-25 PROCEDURE — 2500000002 HC RX 250 W HCPCS SELF ADMINISTERED DRUGS (ALT 637 FOR MEDICARE OP, ALT 636 FOR OP/ED): Performed by: STUDENT IN AN ORGANIZED HEALTH CARE EDUCATION/TRAINING PROGRAM

## 2025-04-25 PROCEDURE — 97161 PT EVAL LOW COMPLEX 20 MIN: CPT | Mod: GP

## 2025-04-25 PROCEDURE — 36415 COLL VENOUS BLD VENIPUNCTURE: CPT | Performed by: PHYSICIAN ASSISTANT

## 2025-04-25 PROCEDURE — 85027 COMPLETE CBC AUTOMATED: CPT | Performed by: PHYSICIAN ASSISTANT

## 2025-04-25 PROCEDURE — 99232 SBSQ HOSP IP/OBS MODERATE 35: CPT | Performed by: INTERNAL MEDICINE

## 2025-04-25 PROCEDURE — 97116 GAIT TRAINING THERAPY: CPT | Mod: GP

## 2025-04-25 PROCEDURE — 93306 TTE W/DOPPLER COMPLETE: CPT | Performed by: INTERNAL MEDICINE

## 2025-04-25 PROCEDURE — 93010 ELECTROCARDIOGRAM REPORT: CPT | Performed by: INTERNAL MEDICINE

## 2025-04-25 PROCEDURE — 2500000004 HC RX 250 GENERAL PHARMACY W/ HCPCS (ALT 636 FOR OP/ED)

## 2025-04-25 PROCEDURE — 97165 OT EVAL LOW COMPLEX 30 MIN: CPT | Mod: GO | Performed by: OCCUPATIONAL THERAPIST

## 2025-04-25 PROCEDURE — 80048 BASIC METABOLIC PNL TOTAL CA: CPT | Performed by: PHYSICIAN ASSISTANT

## 2025-04-25 PROCEDURE — 2500000001 HC RX 250 WO HCPCS SELF ADMINISTERED DRUGS (ALT 637 FOR MEDICARE OP): Performed by: PHYSICIAN ASSISTANT

## 2025-04-25 RX ORDER — LANOLIN ALCOHOL/MO/W.PET/CERES
400 CREAM (GRAM) TOPICAL ONCE
Status: COMPLETED | OUTPATIENT
Start: 2025-04-25 | End: 2025-04-25

## 2025-04-25 RX ADMIN — LEVOTHYROXINE SODIUM 75 MCG: 75 TABLET ORAL at 05:53

## 2025-04-25 RX ADMIN — CARVEDILOL 37.5 MG: 25 TABLET, FILM COATED ORAL at 10:38

## 2025-04-25 RX ADMIN — CYCLOBENZAPRINE 10 MG: 10 TABLET, FILM COATED ORAL at 23:39

## 2025-04-25 RX ADMIN — GABAPENTIN 300 MG: 300 CAPSULE ORAL at 08:07

## 2025-04-25 RX ADMIN — INSULIN LISPRO 4 UNITS: 100 INJECTION, SOLUTION INTRAVENOUS; SUBCUTANEOUS at 16:48

## 2025-04-25 RX ADMIN — OXYCODONE HYDROCHLORIDE 10 MG: 10 TABLET ORAL at 22:19

## 2025-04-25 RX ADMIN — SACUBITRIL AND VALSARTAN 1 TABLET: 97; 103 TABLET, FILM COATED ORAL at 10:38

## 2025-04-25 RX ADMIN — PANTOPRAZOLE SODIUM 40 MG: 40 TABLET, DELAYED RELEASE ORAL at 06:47

## 2025-04-25 RX ADMIN — CEFAZOLIN SODIUM 2 G: 2 INJECTION, SOLUTION INTRAVENOUS at 06:04

## 2025-04-25 RX ADMIN — INSULIN LISPRO 2 UNITS: 100 INJECTION, SOLUTION INTRAVENOUS; SUBCUTANEOUS at 08:08

## 2025-04-25 RX ADMIN — FENOFIBRATE 160 MG: 160 TABLET ORAL at 08:08

## 2025-04-25 RX ADMIN — GABAPENTIN 300 MG: 300 CAPSULE ORAL at 20:45

## 2025-04-25 RX ADMIN — ATORVASTATIN CALCIUM 10 MG: 10 TABLET, FILM COATED ORAL at 08:08

## 2025-04-25 RX ADMIN — Medication 400 MG: at 02:53

## 2025-04-25 RX ADMIN — OXYCODONE HYDROCHLORIDE 10 MG: 10 TABLET ORAL at 04:25

## 2025-04-25 RX ADMIN — OXYCODONE HYDROCHLORIDE 5 MG: 5 TABLET ORAL at 10:38

## 2025-04-25 RX ADMIN — DULOXETINE HYDROCHLORIDE 60 MG: 60 CAPSULE, DELAYED RELEASE ORAL at 08:08

## 2025-04-25 RX ADMIN — SACUBITRIL AND VALSARTAN 1 TABLET: 97; 103 TABLET, FILM COATED ORAL at 22:21

## 2025-04-25 RX ADMIN — OXYCODONE HYDROCHLORIDE 5 MG: 5 TABLET ORAL at 15:12

## 2025-04-25 RX ADMIN — CARVEDILOL 37.5 MG: 25 TABLET, FILM COATED ORAL at 16:48

## 2025-04-25 RX ADMIN — DULOXETINE HYDROCHLORIDE 60 MG: 60 CAPSULE, DELAYED RELEASE ORAL at 20:45

## 2025-04-25 RX ADMIN — INSULIN LISPRO 2 UNITS: 100 INJECTION, SOLUTION INTRAVENOUS; SUBCUTANEOUS at 12:07

## 2025-04-25 RX ADMIN — HYDROXYCHLOROQUINE SULFATE 400 MG: 200 TABLET, FILM COATED ORAL at 08:07

## 2025-04-25 RX ADMIN — ACETAMINOPHEN 650 MG: 325 TABLET ORAL at 23:38

## 2025-04-25 RX ADMIN — ACETAMINOPHEN 650 MG: 325 TABLET ORAL at 16:48

## 2025-04-25 RX ADMIN — OXYCODONE HYDROCHLORIDE 5 MG: 5 TABLET ORAL at 00:01

## 2025-04-25 SDOH — ECONOMIC STABILITY: HOUSING INSECURITY: IN THE LAST 12 MONTHS, WAS THERE A TIME WHEN YOU WERE NOT ABLE TO PAY THE MORTGAGE OR RENT ON TIME?: NO

## 2025-04-25 SDOH — ECONOMIC STABILITY: FOOD INSECURITY: HOW HARD IS IT FOR YOU TO PAY FOR THE VERY BASICS LIKE FOOD, HOUSING, MEDICAL CARE, AND HEATING?: NOT HARD AT ALL

## 2025-04-25 SDOH — ECONOMIC STABILITY: TRANSPORTATION INSECURITY: IN THE PAST 12 MONTHS, HAS LACK OF TRANSPORTATION KEPT YOU FROM MEDICAL APPOINTMENTS OR FROM GETTING MEDICATIONS?: NO

## 2025-04-25 SDOH — ECONOMIC STABILITY: INCOME INSECURITY: IN THE PAST 12 MONTHS HAS THE ELECTRIC, GAS, OIL, OR WATER COMPANY THREATENED TO SHUT OFF SERVICES IN YOUR HOME?: NO

## 2025-04-25 SDOH — ECONOMIC STABILITY: HOUSING INSECURITY: AT ANY TIME IN THE PAST 12 MONTHS, WERE YOU HOMELESS OR LIVING IN A SHELTER (INCLUDING NOW)?: NO

## 2025-04-25 SDOH — ECONOMIC STABILITY: HOUSING INSECURITY: IN THE PAST 12 MONTHS, HOW MANY TIMES HAVE YOU MOVED WHERE YOU WERE LIVING?: 0

## 2025-04-25 ASSESSMENT — COGNITIVE AND FUNCTIONAL STATUS - GENERAL
CLIMB 3 TO 5 STEPS WITH RAILING: A LOT
MOVING TO AND FROM BED TO CHAIR: A LITTLE
TURNING FROM BACK TO SIDE WHILE IN FLAT BAD: A LITTLE
DRESSING REGULAR LOWER BODY CLOTHING: A LITTLE
MOVING TO AND FROM BED TO CHAIR: A LITTLE
MOVING FROM LYING ON BACK TO SITTING ON SIDE OF FLAT BED WITH BEDRAILS: A LITTLE
DRESSING REGULAR LOWER BODY CLOTHING: A LITTLE
CLIMB 3 TO 5 STEPS WITH RAILING: A LITTLE
PERSONAL GROOMING: A LITTLE
DAILY ACTIVITIY SCORE: 21
WALKING IN HOSPITAL ROOM: A LITTLE
MOBILITY SCORE: 17
WALKING IN HOSPITAL ROOM: A LITTLE
HELP NEEDED FOR BATHING: A LITTLE
MOVING FROM LYING ON BACK TO SITTING ON SIDE OF FLAT BED WITH BEDRAILS: A LITTLE
DRESSING REGULAR UPPER BODY CLOTHING: A LITTLE
STANDING UP FROM CHAIR USING ARMS: A LITTLE
TURNING FROM BACK TO SIDE WHILE IN FLAT BAD: A LITTLE
STANDING UP FROM CHAIR USING ARMS: A LITTLE
DAILY ACTIVITIY SCORE: 20
MOBILITY SCORE: 18
TOILETING: A LITTLE
HELP NEEDED FOR BATHING: A LITTLE

## 2025-04-25 ASSESSMENT — PAIN DESCRIPTION - LOCATION
LOCATION: BACK

## 2025-04-25 ASSESSMENT — PAIN - FUNCTIONAL ASSESSMENT
PAIN_FUNCTIONAL_ASSESSMENT: 0-10

## 2025-04-25 ASSESSMENT — ENCOUNTER SYMPTOMS
BACK PAIN: 1
ENDOCRINE NEGATIVE: 1
GASTROINTESTINAL NEGATIVE: 1
RESPIRATORY NEGATIVE: 1
EYES NEGATIVE: 1
DYSPNEA ON EXERTION: 0
ALLERGIC/IMMUNOLOGIC NEGATIVE: 1
NEUROLOGICAL NEGATIVE: 1
PSYCHIATRIC NEGATIVE: 1
CONSTITUTIONAL NEGATIVE: 1

## 2025-04-25 ASSESSMENT — PAIN SCALES - GENERAL
PAINLEVEL_OUTOF10: 6
PAINLEVEL_OUTOF10: 3
PAINLEVEL_OUTOF10: 0 - NO PAIN
PAINLEVEL_OUTOF10: 3
PAINLEVEL_OUTOF10: 3
PAINLEVEL_OUTOF10: 8
PAINLEVEL_OUTOF10: 6
PAINLEVEL_OUTOF10: 4
PAINLEVEL_OUTOF10: 8
PAINLEVEL_OUTOF10: 6

## 2025-04-25 ASSESSMENT — PAIN DESCRIPTION - DESCRIPTORS
DESCRIPTORS: ACHING
DESCRIPTORS: ACHING;DISCOMFORT
DESCRIPTORS: ACHING

## 2025-04-25 ASSESSMENT — ACTIVITIES OF DAILY LIVING (ADL): LACK_OF_TRANSPORTATION: NO

## 2025-04-25 ASSESSMENT — PAIN DESCRIPTION - ORIENTATION: ORIENTATION: MID

## 2025-04-25 NOTE — PROGRESS NOTES
Occupational Therapy    Evaluation    Patient Name: Toribio Villatoro  MRN: [id number]  Today's Date: 4/25/2025  Time Calculation  Start Time: 1115  Stop Time: 1137  Time Calculation (min): 22 min  2106/2106-A  Eval only     Assessment  IP OT Assessment  Prognosis: Good  End of Session Communication: Bedside nurse  End of Session Patient Position: Up in chair, Alarm on  Patient presents with decline in ADLs, functional transfers, functional mobility and would benefit from OT during acute stay to improve functional independence and safety. Recommend low intensity OT to maximize functional independence and safety.     Plan:  Treatment Interventions: ADL retraining, Functional transfer training, Patient/family training, Equipment evaluation/education, Compensatory technique education  OT Frequency: Daily  OT Discharge Recommendations: Low intensity level of continued care  Equipment Recommended upon Discharge: Wheeled walker (shower chair, reacher, sock aide, LH shoe horn)  OT - OK to Discharge: Yes from acute care OT services to the next level of care when cleared by medical team      Subjective   Current Problem:  1. Back pain of lumbar region with sciatica        2. Other intervertebral disc displacement, lumbar region  Surgical Pathology Exam    Surgical Pathology Exam      3. Spondylolisthesis, lumbar region  Surgical Pathology Exam    Surgical Pathology Exam      4. Other specified hypotension  Transthoracic Echo Complete      5. Chronic systolic (congestive) heart failure  Transthoracic Echo Complete    Transthoracic Echo Complete      6. Dilated idiopathic cardiomyopathy (Multi)  Transthoracic Echo Complete    Transthoracic Echo Complete          General:  General  Reason for Referral: recent surgery  Referred By: Raleigh Campbell MD  Past Medical History Relevant to Rehab: Admitted 4/24/2025 after having the following completed by Dr Campbell:    L3-4 RIGHT MICRODISCECTOMY, INSTRUMENTATION, POSTERIOR INTERBODY  FUSION, POSTERIOR LATERAL FUSION        PMH: diabetic neuropathy, GERD, HLD, HTN, MI, RA  Co-Treatment: PT  Co-Treatment Reason: for safety  Prior to Session Communication: Bedside nurse  Patient Position Received: Up in chair, Alarm on  Preferred Learning Style: verbal  General Comment: Patient in long legged sitting in bed and agreeable to particiapte in OT evaluaiton    Precautions:  Medical Precautions: Fall precautions  Post-Surgical Precautions: Spinal precautions  Precautions Comment: back brace when out of bed    Pain:  Pain Assessment  Pain Assessment: 0-10  0-10 (Numeric) Pain Score:  (reported 4/10 low back pain, 7/10 right leg pain from buttocks to knee)  Pain Type: Acute pain, Surgical pain  Pain Interventions: Repositioned    Objective   Cognition:  Overall Cognitive Status: Within Functional Limits  Orientation Level: Oriented X4    Home Living:  Home Living Comments: Lives in 1 story home with spouse with 1 ANGELES.  Has WIS with seat and GB.     Prior Function:  Prior Function Comments: Was independent with all ADLs and used cane for functional mobility tasks.  Spouse recently had foot surgery and is unable to assist patient    ADL:  UE Dressing Assistance: Independent (don pull over shirt)  LE Dressing Assistance:  (contact guard assist to don underwear and min assist to don pants. Educated patient on safety and comp strategies for LB dressing. Educated patient on use of reacher and patient verbalized understanding.)  Educated patient on keeping commonly used items between shoulder and waist height for ease of reach.     Activity Tolerance:  Endurance: Decreased tolerance for upright activites    Bed Mobility/Transfers:   Bed Mobility  Bed Mobility:  (CGA supine to sit wtih log roll technique. Back brace donned seated at edge of bed. Patient also has right knee brace which was donned prior to functional mobility assessment.)  Transfers  Transfer:  (CGA sit <>stand with min verbal cues for safety and  technique.)    Ambulation/Gait Training:  Functional Mobility  Functional Mobility Performed:  (CGA with WW functional mobility task in room)    Extremities: RUE   RUE : Within Functional Limits and LUE   LUE: Within Functional Limits    Outcome Measures: [facility] Daily Activity  Putting on and taking off regular lower body clothing: A little  Bathing (including washing, rinsing, drying): A little  Putting on and taking off regular upper body clothing: None  Toileting, which includes using toilet, bedpan or urinal: A little  Taking care of personal grooming such as brushing teeth: A little  Eating Meals: None  Daily Activity - Total Score: 20    EDUCATION:  Education  Individual(s) Educated: Patient  Education Provided: Fall precautions, spine precautions  Patient Response to Education: Patient/Caregiver Verbalized Understanding of Information    Goals:   Encounter Problems       Encounter Problems (Active)       Dressings Lower Extremities       STG - Patient will complete lower body dressing independently with AE and comp strategies  (Progressing)       Start:  04/25/25    Expected End:  05/09/25               Functional Balance       STG-Patient will be independent with assistive device dynamic stand task >5 minutes for ADL completion   (Progressing)       Start:  04/25/25    Expected End:  05/09/25               Functional Mobility       STG-Patient will be independent with assistive device functional mobility tasks   (Progressing)       Start:  04/25/25    Expected End:  05/09/25               OT Transfers       STG-Patient will be independent with functional transfers demonstrating good safety   (Progressing)       Start:  04/25/25    Expected End:  05/09/25               Safety       STG-Patient will independently verbalize spine precautions with all functional tasks   (Progressing)       Start:  04/25/25    Expected End:  05/09/25

## 2025-04-25 NOTE — CARE PLAN
Pt safety maintained. Free of falls.     Problem: Pain  Goal: Takes deep breaths with improved pain control throughout the shift  Outcome: Progressing  Goal: Turns in bed with improved pain control throughout the shift  Outcome: Progressing  Goal: Walks with improved pain control throughout the shift  Outcome: Progressing  Goal: Performs ADL's with improved pain control throughout shift  Outcome: Progressing     Problem: Fall/Injury  Goal: Not fall by end of shift  Outcome: Progressing  Goal: Be free from injury by end of the shift  Outcome: Progressing  Goal: Verbalize understanding of personal risk factors for fall in the hospital  Outcome: Progressing

## 2025-04-25 NOTE — CONSULTS
Cardiology Consult           PATIENT NAME:  Toribio Villatoro       MRN: [id number]     DATE of SERVICE: April 25, 2025           Primary Care Physician: Gunner Quiroz MD      Consulting Physician: Dr. Munoz             Reason for consult: Extended period of hypotension in the OR.  Required pressure support for 1 hour    HPI:  This is a 59 y.o. male with hx of dilated nonischemic cardiomyopathy with a EF of 15 to 20%.  A chronic systolic heart failure.  Hypertension .  LVH.  Hyperlipidemia.  Diabetes.  Chronic kidney disease.  Obstructive sleep apnea on CPAP.  Obesity.  Rheumatoid arthritis.  GERD who  came in for elective lumbar spine surgery yesterday on April 24, 2025 ,subsequently he was admitted for overnight due to hypotension in the OR.  Patient denies any  chest pain shortness of breathing.  His blood pressure is still recovered to 119/62 this a.m. with a baseline home blood pressure at home 110-120 / 70s mmhg.   home blood pressure medication was on hold overnight  There is no EKG to review on this admission    Patient has a primary cardiology Dr. Modi    EKG:   Echo:CONCLUSIONS: December 3, 2021   1. The left ventricular systolic function is severely decreased with a 15-20% estimated ejection fraction.   2. Spectral Doppler shows a pseudonormal pattern of left ventricular diastolic filling.   3. The left atrium is moderately dilated.   4. There is mitral stenosis is not seen.   5. Aortic valve stenosis is not present.   6. The pulmonary artery is not well visualized.   7. There is global hypokinesis of the left ventricle with minor regional variations.     Ejection Fractions: 15 to 20%          Stress Test:  No nuclear medicine results found for the past 12 months     Cath: December 30, 2021    CONCLUSIONS: MR cardiac on April 6, 2022   1. The entire Left Main: 0% stenosis.   2. Entire LAD Lesion: The percent stenosis is 0%.   3. Entire CX Lesion: The percent stenosis is 0%.   4. The entire RCA Lesion:  The percent stenosis is 0%.   5. The Left Ventricular Ejection Fraction is 15%.   6. Mitral Regurgitation: Mild.     SUMMARY   =====================================================================  =====================================     1. Dilated left ventricle with moderately depressed systolic   function. Ejection fraction 39%.      2. Mild-moderate left ventricular hypertrophy.      3. Delayed enhancement imaging is normal. No evidence of fibrosis,   infiltrative disease, previous  myocardial infarction, or inflammation of the left ventricular and   right ventricular myocardium.      4. Right ventricular cavity size upper limits of normal with   mild-moderately depressed RV systolic  function. RVEF 43%.      LEFT VENTRICLE: Quantitative LVEF 39 %. There is moderate LV   hypertrophy. LV cavity is mildly enlarged. LV systolic  function is moderately decreased globally.      VIABILITY: Hyperenhancement is normal.     RIGHT VENTRICLE: Quantitative RVEF 43 %. RV wall thickness is normal.   RV is mildly enlarged. RV systolic function is  moderately decreased globally.     LV/RV SEPTUM: The ventricular septum is intact.     LA/RA SEPTUM: The interatrial septum is aneurysmal. A patent foramen   ovale or small secundum atrial septal defect cannot  be ruled out.     LEFT ATRIUM: LA is mildly enlarged.     RIGHT ATRIUM: RA cavity size is normal.     PERICARDIUM: Pericardium is normal. There is no pericardial effusion.   There are no signs of increased intrapericardial  pressures.     PLEURAL EFFUSION: There is no pleural effusion.     AORTIC VALVE: Aortic valve is trileaflet. There is trivial aortic   regurgitation. There is no aortic stenosis.     MITRAL VALVE: Mitral valve leaflets are normal. Mitral valve is   mildly thickened. There is trivial mitral regurgitation.     TRICUSPID VALVE: Tricuspid valve leaflets are normal. There is mild   tricuspid regurgitation.     PULMONIC VALVE: Pulmonic valve leaflets are  normal.     AORTIC ROOT: The aortic root is normal.     OTHER FINDINGS: T1 : Blood pool nulls prior to nulling of   myocardium. Normal. No evidence cardiac amyloid.      Elevated right hemidiaphragm.      Past Medical History:     Medical History[1]           Past Surgical History:     Surgical History[2]               Family History:     Family History[3]         Social History:  Social History[4]         Allergies:      Allergies[5]           Medications:     Prior to Admission Medications   Prescriptions Last Dose Informant Patient Reported? Taking?   DULoxetine (Cymbalta) 60 mg DR capsule 2025 Self No Yes   Sig: Take 1 capsule (60 mg) by mouth 2 times a day.   FreeStyle glucose monitoring kit 2025 Self Yes Yes   Si each if needed.   Jardiance 10 mg Past Week Self Yes Yes   Sig: Take 1 tablet (10 mg) by mouth once daily.   amLODIPine (Norvasc) 10 mg tablet 2025 Self No Yes   Sig: Take 1 tablet (10 mg) by mouth once daily.   atorvastatin (Lipitor) 10 mg tablet 2025 Self No Yes   Sig: Take 1 tablet (10 mg) by mouth once daily.   baclofen (Lioresal) 10 mg tablet 2025 Self No Yes   Sig: TAKE 1 TABLET BY MOUTH THREE TIMES A DAY AS NEEDED FOR MUSCLE SPASM   blood sugar diagnostic (Contour Next Test Strips) strip 2025 Self No Yes   Si strip in the morning and 1 strip in the evening.   carvedilol (Coreg) 25 mg tablet 2025 Self No Yes   Sig: Take 1.5 tablets (37.5 mg) by mouth 2 times daily (morning and late afternoon).   chlorhexidine (Peridex) 0.12 % solution 2025 Self No Yes   Sig: 15 milliliter(s) orally once a day for 2 doses 15 ml  the night before surgery and 15 ml morning of surgery - swish for 30 seconds -DO NOT SWALLOW, SPIT OUT   empagliflozin-linagliptin (Glyxambi) 25-5 mg Past Week Self No Yes   Sig: Take 1 tablet by mouth once daily.   ergocalciferol, vitamin D2, (VITAMIN D2 ORAL) Past Week Self Yes Yes   Sig: Take 1 tablet by mouth once daily.    fenofibrate (Triglide) 160 mg tablet Past Week Self No Yes   Sig: Take 1 tablet (160 mg) by mouth once daily.   Patient taking differently: Take 1 tablet (160 mg) by mouth once daily in the evening.   gabapentin (Neurontin) 300 mg capsule 2025 Self No Yes   Sig: Take 1 capsule (300 mg) by mouth 2 times a day.   glycopyrrolate (Robinul) 1 mg tablet 2025 Self No Yes   Sig: Take 2 tablets (2 mg) by mouth 2 times a day.   hydroxychloroquine (Plaquenil) 200 mg tablet Past Week Self Yes Yes   Sig: Take 2 tablets (400 mg) by mouth once daily. Per pt takes in the evening.   icosapent ethyL (Vascepa) 1 gram capsule Past Month Self No Yes   Sig: Take 2 capsules (2 g) by mouth 2 times daily (morning and late afternoon).   lancets 33 gauge misc 2025 Self Yes Yes   Si Lancet in the morning and at bedtime.   levothyroxine (Synthroid, Levoxyl) 75 mcg tablet 2025 Self No Yes   Sig: Take 6 of 7 mornings a week on an empty stomach at the same time each day, either 30 to 60 minutes prior to breakfast   Patient taking differently: Take 1 tablet (75 mcg) by mouth 6 times a week. Takes Monday-Saturday  Skips    metFORMIN (Glucophage) 500 mg tablet 2025 Self No Yes   Sig: TAKE 1 TABLET BY MOUTH TWICE  DAILY   methylPREDNISolone (Medrol Dospak) 4 mg tablets Not Taking Self No No   Sig: Follow schedule on package instructions   Patient not taking: Reported on 2025   multivit-min/iron/folic acid/K (MULTI-DAY PLUS MINERALS ORAL) Past Month Self Yes Yes   Sig: Take 1 tablet by mouth once daily.   oxybutynin XL (Ditropan-XL) 10 mg 24 hr tablet Past Week Self No Yes   Sig: Take 1 tablet (10 mg) by mouth once daily.   pantoprazole (ProtoNix) 40 mg EC tablet 2025 Self No Yes   Sig: TAKE 1 TABLET BY MOUTH ONCE  DAILY IN THE MORNING TAKE BEFORE MEALS   sacubitriL-valsartan (Entresto)  mg tablet Past Month Self No Yes   Sig: Take 1 tablet by mouth 2 times a day.   spironolactone (Aldactone) 50 mg  "tablet Past Week Self No Yes   Sig: Take 1 tablet (50 mg) by mouth once daily.   syringe with needle (BD Luer-Kate Syringe) 3 mL 18 x 1 1/2\" syringe 2025 Self No Yes   Sig: 3 mL every 14 (fourteen) days. Use as directed   syringe with needle (BD Luer-Kate Syringe) 3 mL 23 x 1\" syringe 2025 Self No Yes   Sig: USE 1 EVERY 14 AS DIRECTED EVERY OTHER WEEK FOR INJECTIONS   syringe, disposable, (BD Luer-Kate Syringe) 3 mL syringe  Self No No   Si each every 14 (fourteen) days. 22G x 1' 3ML, Use as directed every other week for injections   testosterone cypionate (Depo-Testosterone) 200 mg/mL injection 4/10/2025 Self No Yes   Si mg intramuscularly every 3 weeks, single dose vials.   Patient taking differently: 400 mg intramuscularly every 3 weeks, single dose vials. **Last dose 4/10/25**   traMADol (Ultram) 50 mg tablet Past Week Self No Yes   Sig: Take 1 tablet (50 mg) by mouth every 8 hours if needed for severe pain (7 - 10) for up to 7 days.   upadacitinib ER (Rinvoq) 15 mg tablet extended release 24 hr Past Month Self Yes Yes   Sig: Take 1 tablet (15 mg) by mouth once daily.      Facility-Administered Medications Last Administration Doses Remaining   methylPREDNISolone acetate (DEPO-Medrol) injection 80 mg None recorded 1         Scheduled Medications[6]  PRN Medications[7]  Continuous Medications[8]    Review of Systems   Constitutional: Negative.   HENT: Negative.     Eyes: Negative.    Cardiovascular:  Negative for chest pain, dyspnea on exertion and leg swelling.   Respiratory: Negative.     Endocrine: Negative.    Skin: Negative.    Musculoskeletal:  Positive for back pain.   Gastrointestinal: Negative.    Genitourinary: Negative.    Neurological: Negative.    Psychiatric/Behavioral: Negative.     Allergic/Immunologic: Negative.         Vitals:     /62 (BP Location: Right arm, Patient Position: Lying)   Pulse 100   Temp 36.2 °C (97.2 °F) (Temporal)   Resp 16   Ht 1.753 m (5' 9\")   Wt " "111 kg (245 lb)   SpO2 97%   BMI 36.18 kg/m²     Patient Vitals for the past 24 hrs:   BP Temp Temp src Pulse Resp SpO2 Height Weight   04/25/25 0800 119/62 36.2 °C (97.2 °F) Temporal 100 16 97 % -- --   04/25/25 0428 137/72 36.4 °C (97.5 °F) Temporal 96 (!) 30 96 % -- --   04/25/25 0425 -- -- -- 100 (!) 37 95 % -- --   04/25/25 0400 -- -- -- 94 21 93 % -- --   04/25/25 0300 -- -- -- 98 21 95 % -- --   04/25/25 0009 117/79 36.4 °C (97.5 °F) Temporal 102 (!) 42 98 % -- --   04/25/25 0001 -- -- -- 92 (!) 8 -- -- --   04/25/25 0000 -- -- -- 92 (!) 7 -- -- --   04/24/25 2330 -- -- -- 100 22 95 % -- --   04/24/25 2000 -- -- -- -- -- 96 % -- --   04/24/25 1942 107/85 36.3 °C (97.3 °F) Temporal 96 16 92 % 1.753 m (5' 9\") 111 kg (245 lb)   04/24/25 1930 100/65 -- -- 88 13 95 % -- --   04/24/25 1915 115/61 -- -- 88 13 94 % -- --   04/24/25 1900 105/56 -- -- 92 16 94 % -- --   04/24/25 1845 (!) 111/49 -- -- 88 14 95 % -- --   04/24/25 1830 135/63 -- -- 88 11 96 % -- --   04/24/25 1828 -- -- -- -- -- 96 % -- --   04/24/25 1815 126/82 -- -- 90 19 96 % -- --   04/24/25 1800 118/76 -- -- 85 13 93 % -- --   04/24/25 1745 108/62 -- -- 82 11 93 % -- --   04/24/25 1730 107/57 -- -- 84 11 93 % -- --   04/24/25 1715 109/59 -- -- 85 10 92 % -- --   04/24/25 1700 108/59 -- -- 83 12 94 % -- --   04/24/25 1645 108/55 -- -- 83 23 94 % -- --   04/24/25 1630 116/64 -- -- 81 18 94 % -- --   04/24/25 1615 108/56 -- -- 81 20 98 % -- --   04/24/25 1601 111/58 36.5 °C (97.7 °F) Temporal 75 17 98 % -- --   04/24/25 1600 105/57 -- -- -- -- 97 % -- --   04/24/25 1034 107/68 36.3 °C (97.3 °F) Temporal 69 18 99 % 1.753 m (5' 9\") 111 kg (245 lb)        Body mass index is 36.18 kg/m².     Vitals:    04/24/25 1942   Weight: 111 kg (245 lb)          Intake/Output Summary (Last 24 hours) at 4/25/2025 0857  Last data filed at 4/25/2025 0800  Gross per 24 hour   Intake 1700 ml   Output 1650 ml   Net 50 ml           Physical Exam  Constitutional:       " "Appearance: Normal appearance.   HENT:      Head: Normocephalic.   Cardiovascular:      Rate and Rhythm: Normal rate.   Pulmonary:      Effort: Pulmonary effort is normal.   Musculoskeletal:      Cervical back: Normal range of motion.      Comments: Surgical incision  cover with a dressing on the back   Neurological:      Mental Status: He is oriented to person, place, and time.   Psychiatric:         Mood and Affect: Mood normal.        Labs:     Lab Results   Component Value Date    TROPONINI 0.29 (HH) 12/30/2021       No results found for: \"TROPHS\"   Lab Results   Component Value Date    GLUCOSE 179 (H) 04/25/2025    CALCIUM 8.7 04/25/2025     04/25/2025    K 4.6 04/25/2025    CO2 27 04/25/2025     04/25/2025    BUN 28 (H) 04/25/2025    CREATININE 1.46 (H) 04/25/2025      Lab Results   Component Value Date    WBC 9.2 04/25/2025    HGB 13.1 (L) 04/25/2025    HCT 41.0 04/25/2025     (H) 04/25/2025     04/25/2025        LABS:  ABGs: No results found for: \"PH\"  PRO-BNP: No results found for: \"PROBNP\"   TSH:   Lab Results   Component Value Date    TSH 0.30 (L) 11/25/2024      Lipid Profile: No results found for: \"TRIG\", \"HDL\", \"LDLCALC\", \"CHOL\"   Hemoglobin A1C: No results found for: \"HGBA1C\"   Magnesium:    Lab Results   Component Value Date    MG 1.92 04/24/2025              Assessment/Plan:   1.  Hypotension in the OR ;A 59 y.o. male with hx of dilated nonischemic cardiomyopathy with a EF of 15 to 20%.  A chronic systolic heart failure.  Hypertension .  LVH.  Hyperlipidemia.  Diabetes.  Chronic kidney disease.  Obstructive sleep apnea on CPAP.  Obesity.  Rheumatoid arthritis.  GERD who  came in for elective lumbar spine surgery yesterday April 24, 2025 subsequently ,he was admitted for overnight due to hypotension in the OR.  Patient denies any  chest pain shortness of breathing.  His blood pressure is recovered to 119/62 this a.m. with a baseline home blood pressure at home 110-120 / 70s " mmhg.    He is on multiple antihypertension medication giving history of dilated nonischemic cardiomyopathy with a EF of 15 to 20%.  Hypertension .however all of his antihypertensive medication was on hold overnight.    -Check EKG  - Echocardiogram today to reassess LV function.  - Reintroduce home medication with carvedilol 37.5 mg twice daily and Entresto 97/103 twice daily.  - Resume Jardiance 10 mg At noon if the patient  tolerated with carvedilol and Entresto with stable blood pressure .  - Will recommend to hold the home amlodipine 10 mg for now    2.  Dilated nonischemic cardiomyopathy/chronic systolic heart failure; last echo in 2021 with a EF of 15 to 20%.  Clinically patient denies any shortness of breathing or negative for edema.    - Echocardiogram today  - As above reresume home heart failure medication with starting carvedilol and Entresto if the blood pressure tolerated well then  restarted Jardiance 10 mg daily  - Hold spironolactone 50 mg daily for now next    Will resume spironolactone 50 mg daily if the patient tolerated carvedilol.  Entresto.  Add Jardiance.  Will recommend restarting amlodipine 10 mg daily as outpt per Dr. Modi       Consults           [1]  Past Medical History:  Diagnosis Date   • Allergic rhinitis due to pollen 05/16/2019    Seasonal allergic rhinitis due to pollen   • Cardiomegaly 10/12/2022    LVH (left ventricular hypertrophy)   • CHF (congestive heart failure)    • Chronic kidney disease     Stage 3   • Diabetes mellitus (Multi)     type 2   • Diabetic nephropathy (Multi)    • Dilated idiopathic cardiomyopathy (Multi) 02/11/2023   • Disorder of white blood cells, unspecified 06/23/2021    Neutrophilic leukocytosis   • Generalized hyperhidrosis     Hyperhidrosis   • GERD (gastroesophageal reflux disease)    • Hyperlipidemia    • Hypertension    • Hypothyroidism    • Lumbar disc disease    • Myocardial infarction (Multi)     NSTEMI   • Myocarditis 02/11/2023   • NICM  "(nonischemic cardiomyopathy) (Multi) 02/11/2023   • Other cardiomyopathies 10/12/2022    NICM (nonischemic cardiomyopathy)   • Other hammer toe(s) (acquired), right foot 04/12/2022    Acquired hammertoe of right foot   • Personal history of other benign neoplasm 05/20/2020    History of other benign neoplasm   • Personal history of other diseases of the circulatory system 01/13/2022    History of myocarditis   • Personal history of other specified conditions 06/23/2021    History of excessive sweating   • Rheumatoid arthritis    • Sleep apnea     Per pt uses a cpap machine.   • Type 2 diabetes mellitus    [2]  Past Surgical History:  Procedure Laterality Date   • COLONOSCOPY     • OTHER SURGICAL HISTORY Right 02/16/2022    Knee replacement   • OTHER SURGICAL HISTORY  02/16/2022    Cardiac catheterization   • OTHER SURGICAL HISTORY      Toe amputation on right foot at age 16.   • UPPER GASTROINTESTINAL ENDOSCOPY     [3]  Family History  Problem Relation Name Age of Onset   • Heart attack Mother Juanita         acute   • Breast cancer Mother Juanita    • Mastectomy Mother Juanita    • Cancer Father Jl    • Heart attack Brother     • Heart disease Maternal Grandmother     • Parkinsonism Maternal Grandfather     • Diabetes Paternal Grandfather     • Heart attack Paternal Grandfather     [4]  Social History  Tobacco Use   • Smoking status: Never     Passive exposure: Never   • Smokeless tobacco: Never   Vaping Use   • Vaping status: Never Used   Substance Use Topics   • Alcohol use: Yes     Comment: very rarely   • Drug use: Never   [5]  Allergies  Allergen Reactions   • Lisinopril Hallucinations     Per pt \"causes suicidal thoughts\".   [6]  Scheduled medications   Medication Dose Route Frequency   • acetaminophen  650 mg oral q6h   • [Held by provider] amLODIPine  10 mg oral Daily   • atorvastatin  10 mg oral Daily   • [Held by provider] carvedilol  37.5 mg oral BID   • DULoxetine  60 mg oral BID   • [Held by provider] " empagliflozin  10 mg oral Daily   • fenofibrate  160 mg oral Daily   • gabapentin  300 mg oral BID   • hydroxychloroquine  400 mg oral Daily   • insulin lispro  0-10 Units subcutaneous TID AC   • levothyroxine  75 mcg oral Daily   • oxygen   inhalation Continuous - 02/gases   • pantoprazole  40 mg oral Daily before breakfast   • polyethylene glycol  17 g oral Daily   • [Held by provider] sacubitriL-valsartan  1 tablet oral BID   • [Held by provider] spironolactone  50 mg oral Daily   • tranexamic acid  1,950 mg oral Once   [7]  PRN medications   Medication   • [Held by provider] baclofen   • cyclobenzaprine   • dextrose   • dextrose   • glucagon   • morphine   • naloxone   • ondansetron    Or   • ondansetron   • oxyCODONE   • oxyCODONE   • oxyCODONE   • oxygen   [8]  Continuous Medications   Medication Dose Last Rate   • norepinephrine  0-1 mcg/kg/min     • sodium chloride 0.9%  100 mL/hr

## 2025-04-25 NOTE — PROGRESS NOTES
"Toribio Villatoro is a 59 y.o. male on day 1 of admission presenting with Back pain of lumbar region with sciatica.    Subjective   Note intraoperative hypotension. Mr. Villatoro was stab;e overnight in CCU-SD. He reports mild to moderate lumbar surgical pain. He is voiding without issue and is tolerating oral intake. Echo planned for this morning.        Objective     Physical Exam  Vitals reviewed.   HENT:      Head: Normocephalic.      Mouth/Throat:      Mouth: Mucous membranes are moist.      Pharynx: Oropharynx is clear.   Eyes:      Extraocular Movements: Extraocular movements intact.   Cardiovascular:      Rate and Rhythm: Normal rate.   Pulmonary:      Effort: Pulmonary effort is normal.   Abdominal:      Palpations: Abdomen is soft.   Musculoskeletal:      Comments: Lumbar dressings with moderate shadowing.  light touch sensation to B LE, except to right ankle/foot (no change in chronic numbness, which was present prior to surgery),  ankle dorsiflexion/plantar flexion is intact to left, slightly weaker dorsiflexion right ankle(old, unchanged). DP 2+/2 palpable     Skin:     General: Skin is warm and dry.      Capillary Refill: Capillary refill takes less than 2 seconds.   Neurological:      General: No focal deficit present.      Mental Status: He is alert. Mental status is at baseline.   Psychiatric:         Mood and Affect: Mood normal.         Last Recorded Vitals  Blood pressure 119/62, pulse 100, temperature 36.2 °C (97.2 °F), temperature source Temporal, resp. rate 16, height 1.753 m (5' 9\"), weight 111 kg (245 lb), SpO2 97%.  Intake/Output last 3 Shifts:  I/O last 3 completed shifts:  In: 1460 (13.1 mL/kg) [P.O.:360; I.V.:1000 (9 mL/kg); IV Piggyback:100]  Out: 1250 (11.2 mL/kg) [Urine:1250 (0.3 mL/kg/hr)]  Weight: 111.1 kg     Relevant Results      Scheduled medications  Scheduled Medications[1]  Continuous medications  Continuous Medications[2]  PRN medications  PRN Medications[3]  Results for orders " placed or performed during the hospital encounter of 04/24/25 (from the past 24 hours)   POCT GLUCOSE   Result Value Ref Range    POCT Glucose 130 (H) 74 - 99 mg/dL   Blood Gas Arterial Full Panel Unsolicited   Result Value Ref Range    POCT pH, Arterial 7.35 (L) 7.38 - 7.42 pH    POCT pCO2, Arterial 41 38 - 42 mm Hg    POCT pO2, Arterial 226 (H) 85 - 95 mm Hg    POCT SO2, Arterial 100 94 - 100 %    POCT Oxy Hemoglobin, Arterial 96.7 94.0 - 98.0 %    POCT Hematocrit Calculated, Arterial 40.0 (L) 41.0 - 52.0 %    POCT Sodium, Arterial 135 (L) 136 - 145 mmol/L    POCT Potassium, Arterial 4.4 3.5 - 5.3 mmol/L    POCT Chloride, Arterial 106 98 - 107 mmol/L    POCT Ionized Calcium, Arterial 1.17 1.10 - 1.33 mmol/L    POCT Glucose, Arterial 143 (H) 74 - 99 mg/dL    POCT Lactate, Arterial 1.5 0.4 - 2.0 mmol/L    POCT Base Excess, Arterial -2.9 (L) -2.0 - 3.0 mmol/L    POCT HCO3 Calculated, Arterial 22.6 22.0 - 26.0 mmol/L    POCT Hemoglobin, Arterial 13.2 (L) 13.5 - 17.5 g/dL    POCT Anion Gap, Arterial 11 10 - 25 mmo/L    Patient Temperature 37.0 degrees Celsius   POCT GLUCOSE   Result Value Ref Range    POCT Glucose 135 (H) 74 - 99 mg/dL   Basic Metabolic Panel   Result Value Ref Range    Glucose 252 (H) 74 - 99 mg/dL    Sodium 134 (L) 136 - 145 mmol/L    Potassium 5.2 3.5 - 5.3 mmol/L    Chloride 104 98 - 107 mmol/L    Bicarbonate 22 21 - 32 mmol/L    Anion Gap 13 10 - 20 mmol/L    Urea Nitrogen 27 (H) 6 - 23 mg/dL    Creatinine 1.60 (H) 0.50 - 1.30 mg/dL    eGFR 49 (L) >60 mL/min/1.73m*2    Calcium 8.5 (L) 8.6 - 10.3 mg/dL   Magnesium   Result Value Ref Range    Magnesium 1.92 1.60 - 2.40 mg/dL   CBC   Result Value Ref Range    WBC 9.2 4.4 - 11.3 x10*3/uL    nRBC 0.0 0.0 - 0.0 /100 WBCs    RBC 4.08 (L) 4.50 - 5.90 x10*6/uL    Hemoglobin 13.1 (L) 13.5 - 17.5 g/dL    Hematocrit 41.0 41.0 - 52.0 %     (H) 80 - 100 fL    MCH 32.1 26.0 - 34.0 pg    MCHC 32.0 32.0 - 36.0 g/dL    RDW 14.1 11.5 - 14.5 %    Platelets 253  150 - 450 x10*3/uL   Basic metabolic panel   Result Value Ref Range    Glucose 179 (H) 74 - 99 mg/dL    Sodium 137 136 - 145 mmol/L    Potassium 4.6 3.5 - 5.3 mmol/L    Chloride 103 98 - 107 mmol/L    Bicarbonate 27 21 - 32 mmol/L    Anion Gap 12 10 - 20 mmol/L    Urea Nitrogen 28 (H) 6 - 23 mg/dL    Creatinine 1.46 (H) 0.50 - 1.30 mg/dL    eGFR 54 (L) >60 mL/min/1.73m*2    Calcium 8.7 8.6 - 10.3 mg/dL   POCT GLUCOSE   Result Value Ref Range    POCT Glucose 180 (H) 74 - 99 mg/dL                 FL fluoro images no charge  Result Date: 4/24/2025  These images are not reportable by radiology and will not be interpreted by  Radiologists.    ECG 12 Lead  Result Date: 4/15/2025  Sinus rhythm with Premature supraventricular complexes with occasional Premature ventricular complexes Anterior infarct , age undetermined Abnormal ECG When compared with ECG of 30-DEC-2021 10:34, Premature ventricular complexes are now Present Premature supraventricular complexes are now Present Anterior infarct is now Present    XR lumbar spine 4+ views w flexion extension  Result Date: 4/8/2025  Interpreted By:  Raleigh Lange, STUDY: XR LUMBAR SPINE 4+ VIEWS WITH FLEXION EXTENSION; 4/7/2025 10:48 am   INDICATION: Signs/Symptoms:low back pain.   ACCESSION NUMBER(S): [id number]   ORDERING CLINICIAN: RALEIGH LANGE   FINDINGS: AP lateral flexion extension x-rays of the lumbar spine shows moderate degenerative changes at all levels with disc height loss, endplate irregularity and osteophyte formation. There is a spondylolisthesis at L3-4 which is grade 1. There is no spondylolysis. There is no fractures. There is no soft tissue abnormalities. Lumbar lordosis is maintained. Range of motion with flexion and extension is preserved. There is no scoliosis. Pedicles are visualized at all levels. Bony pelvis and hips are partially visualized and show minimal bilateral hip degenerative changes.     Signed by: Raleigh Lange 4/8/2025 8:22 AM  Dictation workstation:   [id number]    MR lumbar spine wo IV contrast  Result Date: 4/4/2025  Interpreted By:  Lan Leyva, STUDY: MRI of the lumbar spine without IV contrast;  4/4/2025 5:24 pm   INDICATION: Signs/Symptoms:  Progressive right lower extremity weakness.   ,M47.26 Other spondylosis with radiculopathy, lumbar region,M16.11 Unilateral primary osteoarthritis, right hip,M17.11 Unilateral primary osteoarthritis, right knee   COMPARISON: None.   ACCESSION NUMBER(S): [id number]   ORDERING CLINICIAN: CECILIO CRUZ   TECHNIQUE: Sagittal STIR, T1- and T2-weighted as well as axial T1- and T2- weighted MRI images of the lumbar spine were acquired using a spondylolysis protocol.  No contrast was administered.   FINDINGS: No significant scoliosis. No significant spondylolisthesis.   No compression deformity. The marrow signal is within normal limits. No destructive osseous lesion.   The lower thoracic cord appears unremarkable. Normal conus termination.   Discs desiccated throughout the lumbar spine. Moderately severe disc height loss L4-5 with mixed but predominantly Modic type 2 endplate degenerative changes at that level.   No paraspinous or other soft tissue mass.   LEVELS: L5-S1: Mild disc and facet degenerative changes. No significant stenosis. L4-L5: Moderately severe disc height loss. Broad-based dorsal disc extrusion from right paracentral through left lateral recess regions with dorsal annular tear. There is superior migration of the central/left paracentral region. Baseline disc bulge lateralized to both sides. Mild facet arthrosis. Mild central canal stenosis. Moderate left lateral recess stenosis at the subarticular level. Moderate right-greater-than-left foraminal stenosis. L3-L4: Baseline disc bulge lateralized to both sides. Superimposed superior dorsal disc extrusion central through right paracentral regions. Mild facet arthrosis. Mild central canal stenosis. Moderate bilateral lateral recess  stenosis. Moderate right and mild-to-moderate left foraminal stenosis. The extruded disc abuts the descending right L4 nerve root in the lateral recess at the supra articular level. L2-L3: Disc bulge lateralized to both sides of the small dorsal annular tear. Mild facet arthrosis. Mild central canal stenosis. Moderate bilateral lateral recess stenosis of the superior endplate level. Mild-to-moderate bilateral foraminal stenosis. L1-L2: Mild disc bulge. Mild facet arthrosis. No significant central canal stenosis. Mild bilateral foraminal stenosis.       Multilevel degenerative lumbar spondylosis as described above.   At the L3-4 superiorly extruded disc material abuts the descending right L4 nerve root in the supra articular right lateral recess.   See above for other findings.   MACRO: None   Signed by: Lan Leyva 4/4/2025 6:11 PM Dictation workstation:   [id number]    XR knee right 1-2 views  Result Date: 4/2/2025  STUDY: Knee Radiographs; 04/02/2025 at 05:34. INDICATION: Pain. COMPARISON: None Available. ACCESSION NUMBER(S): [id number] ORDERING CLINICIAN: NITA ABRAHAM TECHNIQUE:  Two view(s) of the right knee. FINDINGS:  There is no displaced fracture.  The alignment is anatomic.  No soft tissue abnormality is seen.  There is no joint effusion.    No acute findings. Signed by Luis Antonio Baer MD    XR hip right with pelvis when performed 2 or 3 views  Result Date: 4/2/2025  STUDY: Pelvis and Right Hip Radiographs; 04/02/2025 at 05:34. INDICATION: Pain. COMPARISON: None Available. ACCESSION NUMBER(S): [id number] ORDERING CLINICIAN: NITA ABRAAHM TECHNIQUE:  AP view of the pelvis and two view(s) of the right hip. FINDINGS:  PELVIS: The pelvic ring is intact.  There is no acute fracture. RIGHT HIP: There is no displaced fracture.  The alignment is anatomic.  No soft tissue abnormality is seen.    No acute findings. Signed by Luis Antonio Baer MD               Assessment & Plan  Back pain of lumbar region with  sciatica    HTN (hypertension), benign    Hyperlipemia, mixed    LVH (left ventricular hypertrophy)    Obstructive sleep apnea, adult    Rheumatoid arthritis    Type 2 diabetes mellitus with stage 3a chronic kidney disease, without long-term current use of insulin (Multi)    Lumbar radiculopathy    Diabetic nephropathy (Multi)    Acquired hypothyroidism    Chronic systolic (congestive) heart failure    Dilated idiopathic cardiomyopathy (Multi)    Other intervertebral disc displacement, lumbar region    Spondylolisthesis, lumbar region  Spondylolisthesis, lumbar region  POD # 1 s/p  L3-4 RIGHT MICRODISCECTOMY, INSTRUMENTATION, POSTERIOR INTERBODY FUSION, POSTERIOR LATERAL FUSION - Right   PT/OT, WBAT B LE, wear lumbar brace for ambulation of distances greater than 10 feet  Multimodal pain regimen  Bowel regimen  Surgical dressing to be removed  on 4/27, leave steristrips in place  Labwork reviewed  VTE prophylaxis: MAURICIO stockings, SCDs, early ambulation  Medicine consulted for medical comanagement  Disp: home   Follow up with Dr. Lange as directed    2. Hypotension  Intraoperative hypotension, has resolved  Echo planned for this morning  Cardiology on consult, appreciate recommendations    I spent 25 minutes in the professional and overall care of this patient.      Alina Renee PA-C           [1] acetaminophen, 650 mg, oral, q6h  [Held by provider] amLODIPine, 10 mg, oral, Daily  atorvastatin, 10 mg, oral, Daily  carvedilol, 37.5 mg, oral, BID  DULoxetine, 60 mg, oral, BID  [Held by provider] empagliflozin, 10 mg, oral, Daily  fenofibrate, 160 mg, oral, Daily  gabapentin, 300 mg, oral, BID  hydroxychloroquine, 400 mg, oral, Daily  insulin lispro, 0-10 Units, subcutaneous, TID AC  levothyroxine, 75 mcg, oral, Daily  oxygen, , inhalation, Continuous - 02/gases  pantoprazole, 40 mg, oral, Daily before breakfast  polyethylene glycol, 17 g, oral, Daily  sacubitriL-valsartan, 1 tablet, oral, BID  [Held by provider]  spironolactone, 50 mg, oral, Daily  tranexamic acid, 1,950 mg, oral, Once    [2] sodium chloride 0.9%, 100 mL/hr    [3] PRN medications: [Held by provider] baclofen, cyclobenzaprine, dextrose, dextrose, glucagon, morphine, naloxone, ondansetron **OR** ondansetron, oxyCODONE, oxyCODONE, oxyCODONE, oxygen

## 2025-04-25 NOTE — PROGRESS NOTES
04/25/25 1108   Discharge Planning   Living Arrangements Spouse/significant other   Support Systems Spouse/significant other   Assistance Needed uses a cane to ambulate   Type of Residence Private residence   Number of Stairs to Enter Residence 1   Number of Stairs Within Residence 0   Do you have animals or pets at home? No   Who is requesting discharge planning? Provider   Home or Post Acute Services None   Expected Discharge Disposition Home   Does the patient need discharge transport arranged? No   Financial Resource Strain   How hard is it for you to pay for the very basics like food, housing, medical care, and heating? Not hard   Housing Stability   In the last 12 months, was there a time when you were not able to pay the mortgage or rent on time? N   In the past 12 months, how many times have you moved where you were living? 0   At any time in the past 12 months, were you homeless or living in a shelter (including now)? N   Transportation Needs   In the past 12 months, has lack of transportation kept you from medical appointments or from getting medications? no   In the past 12 months, has lack of transportation kept you from meetings, work, or from getting things needed for daily living? No   Patient Choice   Patient / Family choosing to utilize agency / facility established prior to hospitalization No   Stroke Family Assessment   Stroke Family Assessment Needed No   Intensity of Service   Intensity of Service >30 min     Met with patient at bedside. Introduced self and role in hospital. Verified address, insurance and PCP is Dr. Quiroz. Uses [facility] and [facility] for medications and has no issues obtaining/paying for them and manages all his own medications. Does use a cane to ambulate, no driving at this time and is independent with ADL's. Patient uses home delivery services for groceries and denies any issues paying bills or getting food into the home. Patient feels that he is able to manage his  health at home and will return home on discharge. CT team to follow patient.

## 2025-04-25 NOTE — ASSESSMENT & PLAN NOTE
-BP stable  -can slowly re-add BP meds as needed if SBP persistently >140  -no changes at discharge

## 2025-04-25 NOTE — CARE PLAN
The patient's goals for the shift include To rest    The clinical goals for the shift include Patient will report pain control with prn medications. Patient medicated twice for pain per orders. Patient reported gradual relief. Patient remained nsr on tele with brief episodes of tachycardia. Patient was having frequent PVCs, med house notified, orders received, and magnesium given (PO). Bentley catheter discontinued at 0500 per doctor's orders. Patient has not voided since (will continue to monitor). Patient safety maintained. Call light within reach.    Problem: Pain  Goal: Takes deep breaths with improved pain control throughout the shift  Outcome: Progressing  Goal: Turns in bed with improved pain control throughout the shift  Outcome: Progressing  Goal: Free from opioid side effects throughout the shift  Outcome: Met  Goal: Free from acute confusion related to pain meds throughout the shift  Outcome: Met     Problem: Fall/Injury  Goal: Not fall by end of shift  Outcome: Progressing  Goal: Be free from injury by end of the shift  Outcome: Progressing  Goal: Verbalize understanding of personal risk factors for fall in the hospital  Outcome: Progressing  Goal: Verbalize understanding of risk factor reduction measures to prevent injury from fall in the home  Outcome: Progressing  Goal: Use assistive devices by end of the shift  Outcome: Progressing  Goal: Pace activities to prevent fatigue by end of the shift  Outcome: Progressing     Problem: Skin  Goal: Decreased wound size/increased tissue granulation at next dressing change  Outcome: Progressing  Goal: Participates in plan/prevention/treatment measures  Outcome: Progressing  Goal: Prevent/manage excess moisture  Outcome: Progressing  Goal: Prevent/minimize sheer/friction injuries  Outcome: Progressing  Goal: Promote/optimize nutrition  Outcome: Progressing  Goal: Promote skin healing  Outcome: Progressing

## 2025-04-25 NOTE — PROGRESS NOTES
Toribio Villatoro is a 59 y.o. male on day 1 of admission presenting with Back pain of lumbar region with sciatica.      Subjective   Patient doing well. Other than back pain he has no complaints. BP stable overnight.       Objective     Last Recorded Vitals  /62 (BP Location: Right arm, Patient Position: Lying)   Pulse 100   Temp 36.2 °C (97.2 °F) (Temporal)   Resp 16   Wt 111 kg (245 lb)   SpO2 97%   Intake/Output last 3 Shifts:    Intake/Output Summary (Last 24 hours) at 4/25/2025 1152  Last data filed at 4/25/2025 0800  Gross per 24 hour   Intake 1700 ml   Output 1650 ml   Net 50 ml       Admission Weight  Weight: 111 kg (245 lb) (04/24/25 1034)    Daily Weight  04/24/25 : 111 kg (245 lb)    Image Results  FL fluoro images no charge  These images are not reportable by radiology and will not be interpreted   by  Radiologists.      Physical Exam  Constitutional:       General: He is not in acute distress.     Appearance: Normal appearance.   HENT:      Head: Normocephalic and atraumatic.      Mouth/Throat:      Mouth: Mucous membranes are moist.   Eyes:      Extraocular Movements: Extraocular movements intact.      Conjunctiva/sclera: Conjunctivae normal.   Cardiovascular:      Rate and Rhythm: Normal rate and regular rhythm.      Heart sounds: Normal heart sounds.   Pulmonary:      Effort: Pulmonary effort is normal.      Breath sounds: Normal breath sounds. No wheezing, rhonchi or rales.   Abdominal:      General: Abdomen is flat. Bowel sounds are normal.      Palpations: Abdomen is soft.   Musculoskeletal:         General: No swelling or tenderness. Normal range of motion.      Cervical back: Normal range of motion and neck supple.   Skin:     General: Skin is warm and dry.      Findings: No rash.   Neurological:      General: No focal deficit present.      Mental Status: He is alert and oriented to person, place, and time.      Cranial Nerves: No cranial nerve deficit.      Sensory: No sensory  deficit.   Psychiatric:         Mood and Affect: Mood normal.         Behavior: Behavior normal.         Relevant Results  Scheduled medications  Scheduled Medications[1]  Continuous medications  Continuous Medications[2]  PRN medications  PRN Medications[3]    Results for orders placed or performed during the hospital encounter of 04/24/25 (from the past 24 hours)   Blood Gas Arterial Full Panel Unsolicited   Result Value Ref Range    POCT pH, Arterial 7.35 (L) 7.38 - 7.42 pH    POCT pCO2, Arterial 41 38 - 42 mm Hg    POCT pO2, Arterial 226 (H) 85 - 95 mm Hg    POCT SO2, Arterial 100 94 - 100 %    POCT Oxy Hemoglobin, Arterial 96.7 94.0 - 98.0 %    POCT Hematocrit Calculated, Arterial 40.0 (L) 41.0 - 52.0 %    POCT Sodium, Arterial 135 (L) 136 - 145 mmol/L    POCT Potassium, Arterial 4.4 3.5 - 5.3 mmol/L    POCT Chloride, Arterial 106 98 - 107 mmol/L    POCT Ionized Calcium, Arterial 1.17 1.10 - 1.33 mmol/L    POCT Glucose, Arterial 143 (H) 74 - 99 mg/dL    POCT Lactate, Arterial 1.5 0.4 - 2.0 mmol/L    POCT Base Excess, Arterial -2.9 (L) -2.0 - 3.0 mmol/L    POCT HCO3 Calculated, Arterial 22.6 22.0 - 26.0 mmol/L    POCT Hemoglobin, Arterial 13.2 (L) 13.5 - 17.5 g/dL    POCT Anion Gap, Arterial 11 10 - 25 mmo/L    Patient Temperature 37.0 degrees Celsius   POCT GLUCOSE   Result Value Ref Range    POCT Glucose 135 (H) 74 - 99 mg/dL   Basic Metabolic Panel   Result Value Ref Range    Glucose 252 (H) 74 - 99 mg/dL    Sodium 134 (L) 136 - 145 mmol/L    Potassium 5.2 3.5 - 5.3 mmol/L    Chloride 104 98 - 107 mmol/L    Bicarbonate 22 21 - 32 mmol/L    Anion Gap 13 10 - 20 mmol/L    Urea Nitrogen 27 (H) 6 - 23 mg/dL    Creatinine 1.60 (H) 0.50 - 1.30 mg/dL    eGFR 49 (L) >60 mL/min/1.73m*2    Calcium 8.5 (L) 8.6 - 10.3 mg/dL   Magnesium   Result Value Ref Range    Magnesium 1.92 1.60 - 2.40 mg/dL   CBC   Result Value Ref Range    WBC 9.2 4.4 - 11.3 x10*3/uL    nRBC 0.0 0.0 - 0.0 /100 WBCs    RBC 4.08 (L) 4.50 - 5.90  x10*6/uL    Hemoglobin 13.1 (L) 13.5 - 17.5 g/dL    Hematocrit 41.0 41.0 - 52.0 %     (H) 80 - 100 fL    MCH 32.1 26.0 - 34.0 pg    MCHC 32.0 32.0 - 36.0 g/dL    RDW 14.1 11.5 - 14.5 %    Platelets 253 150 - 450 x10*3/uL   Basic metabolic panel   Result Value Ref Range    Glucose 179 (H) 74 - 99 mg/dL    Sodium 137 136 - 145 mmol/L    Potassium 4.6 3.5 - 5.3 mmol/L    Chloride 103 98 - 107 mmol/L    Bicarbonate 27 21 - 32 mmol/L    Anion Gap 12 10 - 20 mmol/L    Urea Nitrogen 28 (H) 6 - 23 mg/dL    Creatinine 1.46 (H) 0.50 - 1.30 mg/dL    eGFR 54 (L) >60 mL/min/1.73m*2    Calcium 8.7 8.6 - 10.3 mg/dL   POCT GLUCOSE   Result Value Ref Range    POCT Glucose 180 (H) 74 - 99 mg/dL   Transthoracic Echo Complete   Result Value Ref Range    BSA 2.33 m2   POCT GLUCOSE   Result Value Ref Range    POCT Glucose 189 (H) 74 - 99 mg/dL       FL fluoro images no charge  Result Date: 4/24/2025  These images are not reportable by radiology and will not be interpreted by  Radiologists.    ECG 12 Lead  Result Date: 4/15/2025  Sinus rhythm with Premature supraventricular complexes with occasional Premature ventricular complexes Anterior infarct , age undetermined Abnormal ECG When compared with ECG of 30-DEC-2021 10:34, Premature ventricular complexes are now Present Premature supraventricular complexes are now Present Anterior infarct is now Present    XR lumbar spine 4+ views w flexion extension  Result Date: 4/8/2025  Interpreted By:  Raleigh Lange, STUDY: XR LUMBAR SPINE 4+ VIEWS WITH FLEXION EXTENSION; 4/7/2025 10:48 am   INDICATION: Signs/Symptoms:low back pain.   ACCESSION NUMBER(S): [id number]   ORDERING CLINICIAN: RALEIGH LANGE   FINDINGS: AP lateral flexion extension x-rays of the lumbar spine shows moderate degenerative changes at all levels with disc height loss, endplate irregularity and osteophyte formation. There is a spondylolisthesis at L3-4 which is grade 1. There is no spondylolysis. There is no  fractures. There is no soft tissue abnormalities. Lumbar lordosis is maintained. Range of motion with flexion and extension is preserved. There is no scoliosis. Pedicles are visualized at all levels. Bony pelvis and hips are partially visualized and show minimal bilateral hip degenerative changes.     Signed by: Raleigh Lange 4/8/2025 8:22 AM Dictation workstation:   [id number]    MR lumbar spine wo IV contrast  Result Date: 4/4/2025  Interpreted By:  Lan Leyva, STUDY: MRI of the lumbar spine without IV contrast;  4/4/2025 5:24 pm   INDICATION: Signs/Symptoms:  Progressive right lower extremity weakness.   ,M47.26 Other spondylosis with radiculopathy, lumbar region,M16.11 Unilateral primary osteoarthritis, right hip,M17.11 Unilateral primary osteoarthritis, right knee   COMPARISON: None.   ACCESSION NUMBER(S): [id number]   ORDERING CLINICIAN: CECILIO CRUZ   TECHNIQUE: Sagittal STIR, T1- and T2-weighted as well as axial T1- and T2- weighted MRI images of the lumbar spine were acquired using a spondylolysis protocol.  No contrast was administered.   FINDINGS: No significant scoliosis. No significant spondylolisthesis.   No compression deformity. The marrow signal is within normal limits. No destructive osseous lesion.   The lower thoracic cord appears unremarkable. Normal conus termination.   Discs desiccated throughout the lumbar spine. Moderately severe disc height loss L4-5 with mixed but predominantly Modic type 2 endplate degenerative changes at that level.   No paraspinous or other soft tissue mass.   LEVELS: L5-S1: Mild disc and facet degenerative changes. No significant stenosis. L4-L5: Moderately severe disc height loss. Broad-based dorsal disc extrusion from right paracentral through left lateral recess regions with dorsal annular tear. There is superior migration of the central/left paracentral region. Baseline disc bulge lateralized to both sides. Mild facet arthrosis. Mild central canal  stenosis. Moderate left lateral recess stenosis at the subarticular level. Moderate right-greater-than-left foraminal stenosis. L3-L4: Baseline disc bulge lateralized to both sides. Superimposed superior dorsal disc extrusion central through right paracentral regions. Mild facet arthrosis. Mild central canal stenosis. Moderate bilateral lateral recess stenosis. Moderate right and mild-to-moderate left foraminal stenosis. The extruded disc abuts the descending right L4 nerve root in the lateral recess at the supra articular level. L2-L3: Disc bulge lateralized to both sides of the small dorsal annular tear. Mild facet arthrosis. Mild central canal stenosis. Moderate bilateral lateral recess stenosis of the superior endplate level. Mild-to-moderate bilateral foraminal stenosis. L1-L2: Mild disc bulge. Mild facet arthrosis. No significant central canal stenosis. Mild bilateral foraminal stenosis.       Multilevel degenerative lumbar spondylosis as described above.   At the L3-4 superiorly extruded disc material abuts the descending right L4 nerve root in the supra articular right lateral recess.   See above for other findings.   MACRO: None   Signed by: Lan Leyva 4/4/2025 6:11 PM Dictation workstation:   [id number]    XR knee right 1-2 views  Result Date: 4/2/2025  STUDY: Knee Radiographs; 04/02/2025 at 05:34. INDICATION: Pain. COMPARISON: None Available. ACCESSION NUMBER(S): [id number] ORDERING CLINICIAN: NITA ABRAHAM TECHNIQUE:  Two view(s) of the right knee. FINDINGS:  There is no displaced fracture.  The alignment is anatomic.  No soft tissue abnormality is seen.  There is no joint effusion.    No acute findings. Signed by Luis Antonio Baer MD    XR hip right with pelvis when performed 2 or 3 views  Result Date: 4/2/2025  STUDY: Pelvis and Right Hip Radiographs; 04/02/2025 at 05:34. INDICATION: Pain. COMPARISON: None Available. ACCESSION NUMBER(S): [id number] ORDERING CLINICIAN: NITA ABRAHAM TECHNIQUE:  AP  view of the pelvis and two view(s) of the right hip. FINDINGS:  PELVIS: The pelvic ring is intact.  There is no acute fracture. RIGHT HIP: There is no displaced fracture.  The alignment is anatomic.  No soft tissue abnormality is seen.    No acute findings. Signed by Luis Antonio Baer MD    Assessment & Plan  Back pain of lumbar region with sciatica  -management per primary team  Acquired hypothyroidism  -stable  Chronic systolic (congestive) heart failure  -cardiology consulted  -an echo was ordered  -defer to cardiology  Diabetic nephropathy (Multi)  -renal function at or better than baseline  HTN (hypertension), benign  -BP stable  -can slowly re-add BP meds as needed if SBP persistently >140  -no changes at discharge  Type 2 diabetes mellitus with stage 3a chronic kidney disease, without long-term current use of insulin (Multi)  -BG stable  -goal is <180 in hospital  -continue SSI    Thank you for the consult. At this time patient is medically stable and cleared for discharge. No recommendations to change any of his medications. I will sign off at this time. Please don't hesitate to reach out with any questions or concerns.    Andrez Zambrano MD           [1] acetaminophen, 650 mg, oral, q6h  [Held by provider] amLODIPine, 10 mg, oral, Daily  atorvastatin, 10 mg, oral, Daily  carvedilol, 37.5 mg, oral, BID  DULoxetine, 60 mg, oral, BID  [Held by provider] empagliflozin, 10 mg, oral, Daily  fenofibrate, 160 mg, oral, Daily  gabapentin, 300 mg, oral, BID  hydroxychloroquine, 400 mg, oral, Daily  insulin lispro, 0-10 Units, subcutaneous, TID AC  levothyroxine, 75 mcg, oral, Daily  oxygen, , inhalation, Continuous - 02/gases  pantoprazole, 40 mg, oral, Daily before breakfast  polyethylene glycol, 17 g, oral, Daily  sacubitriL-valsartan, 1 tablet, oral, BID  [Held by provider] spironolactone, 50 mg, oral, Daily  tranexamic acid, 1,950 mg, oral, Once  [2] sodium chloride 0.9%, 100 mL/hr  [3] PRN medications: [Held  by provider] baclofen, cyclobenzaprine, dextrose, dextrose, glucagon, morphine, naloxone, ondansetron **OR** ondansetron, oxyCODONE, oxyCODONE, oxyCODONE, oxygen

## 2025-04-25 NOTE — PROGRESS NOTES
Physical Therapy    Physical Therapy Evaluation    Patient Name: Toribio Villatoro  MRN: [id number]  Today's Date: 4/25/2025   Time Calculation  Start Time: 1110  Stop Time: 1134  Time Calculation (min): 24 min  2106/2106-A    Assessment/Plan   PT Assessment: Pt demonstrates impairments listed below.  Pt appears below baseline level of function and based on current level of function, pt would benefit from continued skilled therapy while in the hospital to ensure safety, decrease risk of falls, and regain strength/mobility back to baseline.  Once stable enough for discharge, pt would benefit from low intensity therapy with use of FWW.     PT Assessment Results: Decreased strength, Decreased range of motion, Impaired balance, Decreased mobility, Pain, Impaired sensation  Rehab Prognosis: Good  Evaluation/Treatment Tolerance: Patient tolerated treatment well, Patient limited by pain  Medical Staff Made Aware: Yes  End of Session Communication: Bedside nurse  End of Session Patient Position: Up in chair, Alarm on  IP OR SWING BED PT PLAN  Inpatient or Swing Bed: Inpatient  PT Plan  Treatment/Interventions: Bed mobility, Transfer training, Gait training, Stair training, Balance training, Neuromuscular re-education, Strengthening, Range of motion, Therapeutic exercise, Therapeutic activity, Home exercise program  PT Plan: Ongoing PT  PT Frequency: Daily  PT Discharge Recommendations: Low intensity level of continued care  Equipment Recommended upon Discharge: Wheeled walker  PT Recommended Transfer Status: Contact guard  PT - OK to Discharge: Yes - To next level of care when cleared by medical team    Subjective     Current Problem:  1. Back pain of lumbar region with sciatica        2. Other intervertebral disc displacement, lumbar region  Surgical Pathology Exam    Surgical Pathology Exam      3. Spondylolisthesis, lumbar region  Surgical Pathology Exam    Surgical Pathology Exam      4. Other specified hypotension   Transthoracic Echo Complete      5. Chronic systolic (congestive) heart failure  Transthoracic Echo Complete    Transthoracic Echo Complete      6. Dilated idiopathic cardiomyopathy (Multi)  Transthoracic Echo Complete    Transthoracic Echo Complete          Past Medical History:  Problem List[1]    General Visit Information:  POD #1 L3-4 RIGHT MICRODISCECTOMY, INSTRUMENTATION, POSTERIOR INTERBODY FUSION, POSTERIOR LATERAL FUSION - Right     Admitted 2/2 intraoperative hypotension    On arrival, pt supine in bed.  Pt in no apparent distress and agreeable to therapy.    General  Reason for Referral: recent surgery  Referred By: Raleigh Lange  Past Medical History Relevant to Rehab: DM, HTN, RA  PT Missed Visit: Yes  Co-Treatment: OT  Prior to Session Communication: Bedside nurse  Patient Position Received: Bed, 3 rail up, Alarm on    Home Living/PLOF:  Pt lives in ranch home with wife with 1 ANGELES.  Has WIS with chair and Gbs.  IND with ADLs.  Began using cane over last couple weeks 2/2 R knee buckling.  Pt works as .  Reports x1 fall and multiple close calls recently.  Owns FWW.    Precautions:  Precautions  Medical Precautions: Fall precautions  Post-Surgical Precautions: Spinal precautions (lumbar brace for ambulation of distances greater than 10 feet and R knee soft locking brace)     Objective     Pain:  Pain Assessment  Pain Assessment: 0-10  0-10 (Numeric) Pain Score:  (pt reports 4/10 LBP and 7/10 R leg pain (from buttocks to knee))  Pain Type: Surgical pain, Acute pain  Pain Interventions: Repositioned    Cognition:  Cognition  Overall Cognitive Status: Within Functional Limits  Orientation Level: Oriented X4    General Assessments:      Activity Tolerance  Endurance: Tolerates 10 - 20 min exercise with multiple rests  Sensation  Sensation Comment: reports numbness/tingling in R lower leg    Static Sitting Balance  Static Sitting-Comment/Number of Minutes: good  Dynamic Sitting Balance  Dynamic  Sitting-Comments: good  Static Standing Balance  Static Standing-Comment/Number of Minutes: fair plus  Dynamic Standing Balance  Dynamic Standing-Comments: fair    Extremity/Trunk Assessments:  LLE strength: grossly WFL  RLE strength: NT 2/2 increased pain; required active assist with R knee ext; able to actively wiggle toes    Functional Mobility:  Bed mobility  Supine to sit: CGA with log roll technique     Transfers  Sit to stand: CGA; x2 trials with VCs for hand placement   Stand to sit: CGA with VCs for hand placement     Ambulation/Stairs  Pt ambulated 20 ft with SBA and FWW; grossly steady without LOB; decreased gait speed and ambulates guarded     Outcome Measures:  [facility] Basic Mobility  Turning from your back to your side while in a flat bed without using bedrails: A little  Moving from lying on your back to sitting on the side of a flat bed without using bedrails: A little  Moving to and from bed to chair (including a wheelchair): A little  Standing up from a chair using your arms (e.g. wheelchair or bedside chair): A little  To walk in hospital room: A little  Climbing 3-5 steps with railing: A little  Basic Mobility - Total Score: 18    Goals:  Encounter Problems       Encounter Problems (Active)       PT Problem       Pt will be able to perform all bed mobility tasks with Mod I.  (Progressing)       Start:  04/25/25    Expected End:  05/09/25            Pt will perform all transfers with Mod I and proper safety mechanics.   (Progressing)       Start:  04/25/25    Expected End:  05/09/25            Pt will ambulate 200 ft with Mod I using FWW for improved functional independence.  (Progressing)       Start:  04/25/25    Expected End:  05/09/25            Pt will be able to negotiate 1 step with 1 HR with Mod I.  (Progressing)       Start:  04/25/25    Expected End:  05/09/25            Pt will be able to maintain spinal precautions while performing functional mobility and tasks.  (Progressing)        Start:  04/25/25    Expected End:  05/09/25                 Education Documentation  Precautions, taught by Patricia Degroot PT at 4/25/2025 12:37 PM.  Learner: Patient  Readiness: Acceptance  Method: Explanation  Response: Verbalizes Understanding    Body Mechanics, taught by Patricia Degroot, PT at 4/25/2025 12:37 PM.  Learner: Patient  Readiness: Acceptance  Method: Explanation  Response: Verbalizes Understanding    Home Exercise Program, taught by Patricia Degroot PT at 4/25/2025 12:37 PM.  Learner: Patient  Readiness: Acceptance  Method: Explanation  Response: Verbalizes Understanding    Mobility Training, taught by Patricia Degroot PT at 4/25/2025 12:37 PM.  Learner: Patient  Readiness: Acceptance  Method: Explanation  Response: Verbalizes Understanding    Education Comments  No comments found.             [1]   Patient Active Problem List  Diagnosis    Acquired hammertoe of right foot    GERD (gastroesophageal reflux disease)    HTN (hypertension), benign    Hyperhidrosis    Hyperlipemia, mixed    Hypogonadism male    LVH (left ventricular hypertrophy)    Nail fungus    Neck pain    Neutrophilic leukocytosis    Obstructive sleep apnea, adult    Overactive bladder    PN (peripheral neuropathy)    Rheumatoid arthritis    Type 2 diabetes mellitus with stage 3a chronic kidney disease, without long-term current use of insulin (Multi)    Allergic rhinitis due to pollen    Anemia, unspecified    Blood loss anemia    Cervical radiculopathy    Lumbar radiculopathy    Diabetic nephropathy (Multi)    Disorder of bursae of shoulder region    Dyspnea, unspecified    Shortness of breath    Excessive sweating    Malaise and fatigue    Nausea with vomiting, unspecified    Pain in joint involving pelvic region and thigh    Rectal hemorrhage    Spasmodic torticollis    Tachycardia, unspecified    Acquired hypothyroidism    Neutrophilia    Chronic systolic (congestive) heart failure    Dilated idiopathic cardiomyopathy  (Multi)    Morbid obesity with BMI of 40.0-44.9, adult (Multi)    Greater trochanteric pain syndrome    Other intervertebral disc displacement, lumbar region    Spondylolisthesis, lumbar region    Back pain of lumbar region with sciatica

## 2025-04-25 NOTE — PROGRESS NOTES
Physical Therapy                 Therapy Communication Note    Patient Name: Toribio Villatoro  MRN: [id number]  Department: [facility]  Room: 2106/2106-A  Today's Date: 4/25/2025     Discipline: Physical Therapy    PT Missed Visit: Yes     Missed Time: Attempt    Comment:  PT orders received, chart reviewed.  Pt currently getting echo at bedside.  Will re-attempt PT eval as able.

## 2025-04-26 VITALS
OXYGEN SATURATION: 94 % | TEMPERATURE: 97 F | DIASTOLIC BLOOD PRESSURE: 65 MMHG | WEIGHT: 245 LBS | RESPIRATION RATE: 18 BRPM | HEART RATE: 81 BPM | SYSTOLIC BLOOD PRESSURE: 98 MMHG | HEIGHT: 69 IN | BODY MASS INDEX: 36.29 KG/M2

## 2025-04-26 LAB
ANION GAP SERPL CALC-SCNC: 13 MMOL/L (ref 10–20)
BUN SERPL-MCNC: 24 MG/DL (ref 6–23)
CALCIUM SERPL-MCNC: 8.4 MG/DL (ref 8.6–10.3)
CHLORIDE SERPL-SCNC: 104 MMOL/L (ref 98–107)
CO2 SERPL-SCNC: 25 MMOL/L (ref 21–32)
CREAT SERPL-MCNC: 1.18 MG/DL (ref 0.5–1.3)
EGFRCR SERPLBLD CKD-EPI 2021: 70 ML/MIN/1.73M*2
ERYTHROCYTE [DISTWIDTH] IN BLOOD BY AUTOMATED COUNT: 14.1 % (ref 11.5–14.5)
GLUCOSE BLD MANUAL STRIP-MCNC: 125 MG/DL (ref 74–99)
GLUCOSE BLD MANUAL STRIP-MCNC: 176 MG/DL (ref 74–99)
GLUCOSE SERPL-MCNC: 121 MG/DL (ref 74–99)
HCT VFR BLD AUTO: 38 % (ref 41–52)
HGB BLD-MCNC: 12.2 G/DL (ref 13.5–17.5)
MCH RBC QN AUTO: 32.5 PG (ref 26–34)
MCHC RBC AUTO-ENTMCNC: 32.1 G/DL (ref 32–36)
MCV RBC AUTO: 101 FL (ref 80–100)
NRBC BLD-RTO: 0 /100 WBCS (ref 0–0)
PLATELET # BLD AUTO: 211 X10*3/UL (ref 150–450)
POTASSIUM SERPL-SCNC: 4.1 MMOL/L (ref 3.5–5.3)
RBC # BLD AUTO: 3.75 X10*6/UL (ref 4.5–5.9)
SODIUM SERPL-SCNC: 138 MMOL/L (ref 136–145)
WBC # BLD AUTO: 7.1 X10*3/UL (ref 4.4–11.3)

## 2025-04-26 PROCEDURE — 7100000011 HC EXTENDED STAY RECOVERY HOURLY - NURSING UNIT

## 2025-04-26 PROCEDURE — 97530 THERAPEUTIC ACTIVITIES: CPT | Mod: GP,CQ

## 2025-04-26 PROCEDURE — 82947 ASSAY GLUCOSE BLOOD QUANT: CPT

## 2025-04-26 PROCEDURE — 36415 COLL VENOUS BLD VENIPUNCTURE: CPT | Performed by: PHYSICIAN ASSISTANT

## 2025-04-26 PROCEDURE — 97535 SELF CARE MNGMENT TRAINING: CPT | Mod: GO,CO

## 2025-04-26 PROCEDURE — 99024 POSTOP FOLLOW-UP VISIT: CPT | Performed by: NURSE PRACTITIONER

## 2025-04-26 PROCEDURE — 2500000001 HC RX 250 WO HCPCS SELF ADMINISTERED DRUGS (ALT 637 FOR MEDICARE OP): Performed by: NURSE PRACTITIONER

## 2025-04-26 PROCEDURE — 2500000001 HC RX 250 WO HCPCS SELF ADMINISTERED DRUGS (ALT 637 FOR MEDICARE OP): Performed by: INTERNAL MEDICINE

## 2025-04-26 PROCEDURE — 2500000002 HC RX 250 W HCPCS SELF ADMINISTERED DRUGS (ALT 637 FOR MEDICARE OP, ALT 636 FOR OP/ED): Performed by: STUDENT IN AN ORGANIZED HEALTH CARE EDUCATION/TRAINING PROGRAM

## 2025-04-26 PROCEDURE — 2500000001 HC RX 250 WO HCPCS SELF ADMINISTERED DRUGS (ALT 637 FOR MEDICARE OP): Performed by: PHYSICIAN ASSISTANT

## 2025-04-26 PROCEDURE — 94660 CPAP INITIATION&MGMT: CPT

## 2025-04-26 PROCEDURE — 2500000004 HC RX 250 GENERAL PHARMACY W/ HCPCS (ALT 636 FOR OP/ED): Performed by: PHYSICIAN ASSISTANT

## 2025-04-26 PROCEDURE — 97116 GAIT TRAINING THERAPY: CPT | Mod: GP,CQ

## 2025-04-26 PROCEDURE — 80048 BASIC METABOLIC PNL TOTAL CA: CPT | Performed by: PHYSICIAN ASSISTANT

## 2025-04-26 PROCEDURE — 85027 COMPLETE CBC AUTOMATED: CPT | Performed by: PHYSICIAN ASSISTANT

## 2025-04-26 PROCEDURE — 2500000001 HC RX 250 WO HCPCS SELF ADMINISTERED DRUGS (ALT 637 FOR MEDICARE OP): Performed by: STUDENT IN AN ORGANIZED HEALTH CARE EDUCATION/TRAINING PROGRAM

## 2025-04-26 RX ORDER — CARVEDILOL 25 MG/1
25 TABLET ORAL 2 TIMES DAILY
Status: DISCONTINUED | OUTPATIENT
Start: 2025-04-26 | End: 2025-04-26 | Stop reason: HOSPADM

## 2025-04-26 RX ORDER — OXYCODONE HYDROCHLORIDE 5 MG/1
5 TABLET ORAL EVERY 6 HOURS PRN
Qty: 28 TABLET | Refills: 0 | Status: SHIPPED | OUTPATIENT
Start: 2025-04-26 | End: 2025-05-03

## 2025-04-26 RX ORDER — CYCLOBENZAPRINE HCL 10 MG
10 TABLET ORAL 3 TIMES DAILY PRN
Qty: 90 TABLET | Refills: 0 | Status: SHIPPED | OUTPATIENT
Start: 2025-04-26 | End: 2025-05-26

## 2025-04-26 RX ADMIN — HYDROXYCHLOROQUINE SULFATE 400 MG: 200 TABLET, FILM COATED ORAL at 08:48

## 2025-04-26 RX ADMIN — ATORVASTATIN CALCIUM 10 MG: 10 TABLET, FILM COATED ORAL at 08:47

## 2025-04-26 RX ADMIN — FENOFIBRATE 160 MG: 160 TABLET ORAL at 08:47

## 2025-04-26 RX ADMIN — OXYCODONE HYDROCHLORIDE 10 MG: 10 TABLET ORAL at 12:05

## 2025-04-26 RX ADMIN — SACUBITRIL AND VALSARTAN 1 TABLET: 97; 103 TABLET, FILM COATED ORAL at 08:47

## 2025-04-26 RX ADMIN — DULOXETINE HYDROCHLORIDE 60 MG: 60 CAPSULE, DELAYED RELEASE ORAL at 08:47

## 2025-04-26 RX ADMIN — LEVOTHYROXINE SODIUM 75 MCG: 75 TABLET ORAL at 06:17

## 2025-04-26 RX ADMIN — CARVEDILOL 25 MG: 25 TABLET, FILM COATED ORAL at 10:02

## 2025-04-26 RX ADMIN — OXYCODONE HYDROCHLORIDE 10 MG: 10 TABLET ORAL at 02:26

## 2025-04-26 RX ADMIN — OXYCODONE HYDROCHLORIDE 10 MG: 10 TABLET ORAL at 06:45

## 2025-04-26 RX ADMIN — ACETAMINOPHEN 650 MG: 325 TABLET ORAL at 12:04

## 2025-04-26 RX ADMIN — PANTOPRAZOLE SODIUM 40 MG: 40 TABLET, DELAYED RELEASE ORAL at 06:17

## 2025-04-26 RX ADMIN — POLYETHYLENE GLYCOL 3350 17 G: 17 POWDER, FOR SOLUTION ORAL at 08:46

## 2025-04-26 RX ADMIN — GABAPENTIN 300 MG: 300 CAPSULE ORAL at 08:47

## 2025-04-26 RX ADMIN — ACETAMINOPHEN 650 MG: 325 TABLET ORAL at 06:17

## 2025-04-26 ASSESSMENT — PAIN - FUNCTIONAL ASSESSMENT
PAIN_FUNCTIONAL_ASSESSMENT: 0-10

## 2025-04-26 ASSESSMENT — COGNITIVE AND FUNCTIONAL STATUS - GENERAL
DRESSING REGULAR LOWER BODY CLOTHING: A LITTLE
MOVING FROM LYING ON BACK TO SITTING ON SIDE OF FLAT BED WITH BEDRAILS: A LITTLE
MOBILITY SCORE: 18
STANDING UP FROM CHAIR USING ARMS: A LITTLE
WALKING IN HOSPITAL ROOM: A LITTLE
MOVING TO AND FROM BED TO CHAIR: A LITTLE
DAILY ACTIVITIY SCORE: 21
HELP NEEDED FOR BATHING: A LITTLE
TOILETING: A LITTLE
CLIMB 3 TO 5 STEPS WITH RAILING: A LITTLE
TURNING FROM BACK TO SIDE WHILE IN FLAT BAD: A LITTLE

## 2025-04-26 ASSESSMENT — PAIN SCALES - GENERAL
PAINLEVEL_OUTOF10: 6
PAINLEVEL_OUTOF10: 7
PAINLEVEL_OUTOF10: 7
PAINLEVEL_OUTOF10: 6
PAINLEVEL_OUTOF10: 8
PAINLEVEL_OUTOF10: 7
PAINLEVEL_OUTOF10: 4
PAINLEVEL_OUTOF10: 4

## 2025-04-26 ASSESSMENT — PAIN DESCRIPTION - LOCATION
LOCATION: BACK

## 2025-04-26 ASSESSMENT — ACTIVITIES OF DAILY LIVING (ADL): HOME_MANAGEMENT_TIME_ENTRY: 26

## 2025-04-26 NOTE — PROGRESS NOTES
Physical Therapy    Physical Therapy Treatment    Patient Name: Toribio Villatoro  MRN: [id number]  Today's Date: 4/26/2025  Time Calculation  Start Time: 1107  Stop Time: 1137  Time Calculation (min): 30 min     4104/4104-A       04/26/25 1107   PT  Visit   PT Received On 04/26/25   Response to Previous Treatment Patient with no complaints from previous session.   General   Reason for Referral recent surgery   Referred By Raleigh Campbell MD   Past Medical History Relevant to Rehab Admitted 4/24/2025 after having the following completed by Dr Campbell:    L3-4 RIGHT MICRODISCECTOMY, INSTRUMENTATION, POSTERIOR INTERBODY FUSION, POSTERIOR LATERAL FUSION        PMH: diabetic neuropathy, GERD, HLD, HTN, MI, RA   Prior to Session Communication Bedside nurse   Patient Position Received Up in chair;Alarm off, not on at start of session   Preferred Learning Style verbal;kinesthetic   General Comment Pt in chair visiting with family, agreeable to therapy. R knee brace applied for stability 2/2 lumber nerve impingement. Back brace donned during session. Pt able to teach back precautions   Precautions   Medical Precautions Fall precautions   Post-Surgical Precautions Spinal precautions   Braces Applied Back brace donned during session, R knee brace applied for stability during WB activities   Precautions Comment back brace when out of bed   Therapeutic Exercise   Therapeutic Exercise Performed Yes   Therapeutic Exercise Activity 1 Seated: LAQ, marches, hamstring curl x 10, verbal cues for control, limited ROM in RLE. Fasciculations noted in R quad after exercise, requiring increased rest breaks  (R knee brace on, unlocked)   Therapeutic Exercise Activity 2 Standing: marches, hamstring curls x 10, R knee brace locked for assist in support   Ambulation/Gait Training   Ambulation/Gait Training Performed Yes   Ambulation/Gait Training 1   Surface 1 Level tile   Device 1 Rolling walker   Gait Support Devices Gait belt  (R knee  brace locked)   Assistance 1 Contact guard   Quality of Gait 1 Diminished heel strike;Decreased step length;Antalgic   Comments/Distance (ft) 1 2 x 50' with standing breaks x 2 d/t c/o R knee giving out, no instability noted. Decreased speed, decreased control noted in BLE swing phase. Inconsistent amber with prolonged stance time on RLE at times   Ambulation/Gait Training 2   Surface 2 Level tile   Device 2 No device   Gait Support Devices Gait belt   Assistance 2 Minimum assistance;Hand held assistance   Quality of Gait 2 Diminished heel strike;Inconsistent stride length;Decreased step length;Antalgic   Comments/Distance (ft) 2 20' holding SPTA elbows, increased weight shift, consistent amber, intermittent forward lean using support to correct, minimal instability noted   Transfers   Transfer Yes   Transfer 1   Technique 1 Sit to stand;Stand to sit   Transfer Device 1 Gait belt;Walker   Transfer Level of Assistance 1 Contact guard   Trials/Comments 1 STS x 3, increased time and effort to complete, increased BUE support and c/o increased abdominal exertion. Pt able to complete while following precautions with proper body mechanics   Activity Tolerance   Endurance Tolerates 30 min exercise with multiple rests   PT Assessment   PT Assessment Results Decreased strength;Decreased range of motion;Impaired balance;Decreased mobility;Pain;Impaired sensation   Rehab Prognosis Good   End of Session Communication Bedside nurse   Assessment Comment Pt put forth good effort, displaying good knowledge and follow through of spinal precautions, eager to feel better.   End of Session Patient Position Up in chair;Alarm off, not on at start of session   PT Plan   PT Plan Ongoing PT   PT Frequency Daily   PT Discharge Recommendations Low intensity level of continued care   Equipment Recommended upon Discharge Wheeled walker  (shower chair, reacher, sock aide, LH shoe horn)       Assessment/Plan         PT  Plan  Treatment/Interventions: Bed mobility, Transfer training, Gait training, Stair training, Balance training, Neuromuscular re-education, Strengthening, Range of motion, Therapeutic exercise, Therapeutic activity, Home exercise program  PT Plan: Ongoing PT  PT Frequency: Daily  PT Discharge Recommendations: Low intensity level of continued care  Equipment Recommended upon Discharge: Wheeled walker (shower chair, reacher, sock aide, LH shoe horn)  PT Recommended Transfer Status: Contact guard  PT - OK to Discharge: Yes    Outcome Measures:  [facility] Basic Mobility  Turning from your back to your side while in a flat bed without using bedrails: A little  Moving from lying on your back to sitting on the side of a flat bed without using bedrails: A little  Moving to and from bed to chair (including a wheelchair): A little  Standing up from a chair using your arms (e.g. wheelchair or bedside chair): A little  To walk in hospital room: A little  Climbing 3-5 steps with railing: A little  Basic Mobility - Total Score: 18                             EDUCATION:     Education Documentation  Precautions, taught by Daron Alvarado PTA at 4/26/2025  1:15 PM.  Learner: Patient  Readiness: Acceptance  Method: Explanation, Demonstration  Response: Demonstrated Understanding, Verbalizes Understanding    Body Mechanics, taught by Daron Alvarado PTA at 4/26/2025  1:15 PM.  Learner: Patient  Readiness: Acceptance  Method: Explanation, Demonstration  Response: Demonstrated Understanding, Verbalizes Understanding    Mobility Training, taught by Daron Alvarado PTA at 4/26/2025  1:15 PM.  Learner: Patient  Readiness: Acceptance  Method: Explanation, Demonstration  Response: Demonstrated Understanding, Verbalizes Understanding    Education Comments  No comments found.        GOALS:  Encounter Problems       Encounter Problems (Active)       PT Problem       Pt will be able to perform all bed mobility tasks with Mod I.  (Progressing)        Start:  04/25/25    Expected End:  05/09/25            Pt will perform all transfers with Mod I and proper safety mechanics.   (Progressing)       Start:  04/25/25    Expected End:  05/09/25            Pt will ambulate 200 ft with Mod I using FWW for improved functional independence.  (Progressing)       Start:  04/25/25    Expected End:  05/09/25            Pt will be able to negotiate 1 step with 1 HR with Mod I.  (Progressing)       Start:  04/25/25    Expected End:  05/09/25            Pt will be able to maintain spinal precautions while performing functional mobility and tasks.  (Progressing)       Start:  04/25/25    Expected End:  05/09/25                   Flowsheet documented by: ESTEPHANIE Pace supervised this treatment and reviewed this note. Daron Alvarado PTA

## 2025-04-26 NOTE — DISCHARGE SUMMARY
Discharge Diagnosis  Back pain of lumbar region with sciatica    Issues Requiring Follow-Up  Post-operative pain  PT/OT    Test Results Pending At Discharge  Pending Labs       Order Current Status    Surgical Pathology Exam In process            Hospital Course     Discharge summary      This patient Toribio Villatoro was admitted to the hospital on 4/24/2025  after undergoing Procedure(s) (LRB):  L3-4 RIGHT MICRODISCECTOMY, INSTRUMENTATION, POSTERIOR INTERBODY FUSION, POSTERIOR LATERAL FUSION (Right) without complications.    During the postoperative period,while in hospital, patient was medically managed by the hospitalist. Please see medial notes and H&P for patients additional diagnoses.  Ortho agrees with all medical diagnoses and treatments while patient in hospital.  No significant or unexpected findings or abnormal ortho imaging were noted during the hospital stay    Hospital course      Patient tolerated surgical procedure well and there was no complications. Patient progressed adequately through their recovery during hospital stay including PT and rehabilitation.    Patient was then D/C on  to home  in stable condition.  Patient was instructed on the use of pain medications, the signs and symptoms of infection, and was given our number to call should they have any questions or concerns following discharge.    Based on my clinical judgment, the patient was provided with a 7-day prescription for opioid medication at 30 MED, indicated for treatment of acute pain in the setting of recent spinal surgery as above. OARRS report was run and has demonstrated an appropriate time course.  The patient has been provided with counseling pertaining to safe use of opioid medication.    Pertinent Physical Exam At Time of Discharge  Alert, oriented x 3, cooperative with examination.     Lumbar dressing is intact with moderate, unchanged form yesterday shadowing.   No elsie-incisional ecchymosis.  Light tough sensation in LE  "bilaterally intact, except for chronic numbness it the right ankle and foot, which was present prior to the surgery. Ankle dorsiflexion and plantar flexion are intact on the left and minimally weaker on the right which is chronic and unchanged. DR and TP pulses and palpable, strong 2+  and symmetrical.    Home Medications     Medication List      START taking these medications     cyclobenzaprine 10 mg tablet; Commonly known as: Flexeril; Take 1 tablet   (10 mg) by mouth 3 times a day as needed for muscle spasms.   oxyCODONE 5 mg immediate release tablet; Commonly known as: Roxicodone;   Take 1 tablet (5 mg) by mouth every 6 hours if needed for moderate pain (4   - 6) for up to 7 days.     CHANGE how you take these medications     fenofibrate 160 mg tablet; Commonly known as: Triglide; Take 1 tablet   (160 mg) by mouth once daily.; What changed: when to take this   levothyroxine 75 mcg tablet; Commonly known as: Synthroid, Levoxyl; Take   6 of 7 mornings a week on an empty stomach at the same time each day,   either 30 to 60 minutes prior to breakfast; What changed: how much to   take, how to take this, when to take this, additional instructions   testosterone cypionate 200 mg/mL injection; Commonly known as:   Depo-Testosterone; 400 mg intramuscularly every 3 weeks, single dose   vials.; What changed: additional instructions     CONTINUE taking these medications     atorvastatin 10 mg tablet; Commonly known as: Lipitor; Take 1 tablet (10   mg) by mouth once daily.   * BD Luer-Kate Syringe 3 mL 18 x 1 1/2\" syringe; Generic drug: syringe   with needle; 3 mL every 14 (fourteen) days. Use as directed   * BD Luer-Kate Syringe 3 mL 23 x 1\" syringe; Generic drug: syringe with   needle; USE 1 EVERY 14 AS DIRECTED EVERY OTHER WEEK FOR INJECTIONS   Blood glucose monitoring meter   carvedilol 25 mg tablet; Commonly known as: Coreg; Take 1.5 tablets   (37.5 mg) by mouth 2 times daily (morning and late afternoon).   Contour Next " Test Strips; Generic drug: blood sugar diagnostic; 1 strip   in the morning and 1 strip in the evening.   DULoxetine 60 mg DR capsule; Commonly known as: Cymbalta; Take 1 capsule   (60 mg) by mouth 2 times a day.   Entresto  mg tablet; Generic drug: sacubitriL-valsartan; Take 1   tablet by mouth 2 times a day.   gabapentin 300 mg capsule; Commonly known as: Neurontin; Take 1 capsule   (300 mg) by mouth 2 times a day.   glycopyrrolate 1 mg tablet; Commonly known as: Robinul; Take 2 tablets   (2 mg) by mouth 2 times a day.   Glyxambi 25-5 mg; Generic drug: empagliflozin-linagliptin; Take 1 tablet   by mouth once daily.   hydroxychloroquine 200 mg tablet; Commonly known as: Plaquenil   icosapent ethyL 1 gram capsule; Commonly known as: Vascepa; Take 2   capsules (2 g) by mouth 2 times daily (morning and late afternoon).   Jardiance 10 mg tablet; Generic drug: empagliflozin   lancets 33 gauge misc   metFORMIN 500 mg tablet; Commonly known as: Glucophage; TAKE 1 TABLET BY   MOUTH TWICE  DAILY   MULTI-DAY PLUS MINERALS ORAL   oxybutynin XL 10 mg 24 hr tablet; Commonly known as: Ditropan-XL; Take 1   tablet (10 mg) by mouth once daily.   pantoprazole 40 mg EC tablet; Commonly known as: ProtoNix; TAKE 1 TABLET   BY MOUTH ONCE  DAILY IN THE MORNING TAKE BEFORE MEALS   Rinvoq 15 mg tablet extended release 24 hr; Generic drug: upadacitinib   ER   syringe (disposable) 3 mL syringe; Commonly known as: BD Luer-Kate   Syringe; 1 each every 14 (fourteen) days. 22G x 1' 3ML, Use as directed   every other week for injections   VITAMIN D2 ORAL  * This list has 2 medication(s) that are the same as other medications   prescribed for you. Read the directions carefully, and ask your doctor or   other care provider to review them with you.     STOP taking these medications     amLODIPine 10 mg tablet; Commonly known as: Norvasc   baclofen 10 mg tablet; Commonly known as: Lioresal   chlorhexidine 0.12 % solution; Commonly known as:  Peridex   methylPREDNISolone 4 mg tablets; Commonly known as: Medrol Dospak   spironolactone 50 mg tablet; Commonly known as: Aldactone   traMADol 50 mg tablet; Commonly known as: Ultram           Patient may WBAT on both LE, wear lumbar brace while walking a distance > 10 feet, use a walker for stability.  Surgical dressing to be removed on 04/27/2025, steri strips will stay. Follow up with Dr. Lange as directed.      Outpatient Follow-Up  Future Appointments   Date Time Provider Department Center   5/9/2025 10:45 AM Marcel Martinez PA-C [id number] [place]   8/19/2025  4:20 PM Gunner Quiroz MD [id number] [place]   10/8/2025  3:00 PM Jordan Modi DO [id number] [place]          Pertinent Physical Exam At Time of Discharge  Physical Exam    Home Medications     Medication List      START taking these medications     cyclobenzaprine 10 mg tablet; Commonly known as: Flexeril; Take 1 tablet   (10 mg) by mouth 3 times a day as needed for muscle spasms.   oxyCODONE 5 mg immediate release tablet; Commonly known as: Roxicodone;   Take 1 tablet (5 mg) by mouth every 6 hours if needed for moderate pain (4   - 6) for up to 7 days.     CHANGE how you take these medications     fenofibrate 160 mg tablet; Commonly known as: Triglide; Take 1 tablet   (160 mg) by mouth once daily.; What changed: when to take this   levothyroxine 75 mcg tablet; Commonly known as: Synthroid, Levoxyl; Take   6 of 7 mornings a week on an empty stomach at the same time each day,   either 30 to 60 minutes prior to breakfast; What changed: how much to   take, how to take this, when to take this, additional instructions   testosterone cypionate 200 mg/mL injection; Commonly known as:   Depo-Testosterone; 400 mg intramuscularly every 3 weeks, single dose   vials.; What changed: additional instructions     CONTINUE taking these medications     atorvastatin 10 mg tablet; Commonly known as: Lipitor; Take 1 tablet (10   mg) by mouth once daily.   * BD  "Luer-Kate Syringe 3 mL 18 x 1 1/2\" syringe; Generic drug: syringe   with needle; 3 mL every 14 (fourteen) days. Use as directed   * BD Luer-Kate Syringe 3 mL 23 x 1\" syringe; Generic drug: syringe with   needle; USE 1 EVERY 14 AS DIRECTED EVERY OTHER WEEK FOR INJECTIONS   Blood glucose monitoring meter   carvedilol 25 mg tablet; Commonly known as: Coreg; Take 1.5 tablets   (37.5 mg) by mouth 2 times daily (morning and late afternoon).   Contour Next Test Strips; Generic drug: blood sugar diagnostic; 1 strip   in the morning and 1 strip in the evening.   DULoxetine 60 mg DR capsule; Commonly known as: Cymbalta; Take 1 capsule   (60 mg) by mouth 2 times a day.   Entresto  mg tablet; Generic drug: sacubitriL-valsartan; Take 1   tablet by mouth 2 times a day.   gabapentin 300 mg capsule; Commonly known as: Neurontin; Take 1 capsule   (300 mg) by mouth 2 times a day.   glycopyrrolate 1 mg tablet; Commonly known as: Robinul; Take 2 tablets   (2 mg) by mouth 2 times a day.   Glyxambi 25-5 mg; Generic drug: empagliflozin-linagliptin; Take 1 tablet   by mouth once daily.   hydroxychloroquine 200 mg tablet; Commonly known as: Plaquenil   icosapent ethyL 1 gram capsule; Commonly known as: Vascepa; Take 2   capsules (2 g) by mouth 2 times daily (morning and late afternoon).   Jardiance 10 mg tablet; Generic drug: empagliflozin   lancets 33 gauge misc   metFORMIN 500 mg tablet; Commonly known as: Glucophage; TAKE 1 TABLET BY   MOUTH TWICE  DAILY   MULTI-DAY PLUS MINERALS ORAL   oxybutynin XL 10 mg 24 hr tablet; Commonly known as: Ditropan-XL; Take 1   tablet (10 mg) by mouth once daily.   pantoprazole 40 mg EC tablet; Commonly known as: ProtoNix; TAKE 1 TABLET   BY MOUTH ONCE  DAILY IN THE MORNING TAKE BEFORE MEALS   Rinvoq 15 mg tablet extended release 24 hr; Generic drug: upadacitinib   ER   syringe (disposable) 3 mL syringe; Commonly known as: BD Luer-Kate   Syringe; 1 each every 14 (fourteen) days. 22G x 1' 3ML, Use as " directed   every other week for injections   VITAMIN D2 ORAL  * This list has 2 medication(s) that are the same as other medications   prescribed for you. Read the directions carefully, and ask your doctor or   other care provider to review them with you.     STOP taking these medications     amLODIPine 10 mg tablet; Commonly known as: Norvasc   baclofen 10 mg tablet; Commonly known as: Lioresal   chlorhexidine 0.12 % solution; Commonly known as: Peridex   methylPREDNISolone 4 mg tablets; Commonly known as: Medrol Dospak   spironolactone 50 mg tablet; Commonly known as: Aldactone   traMADol 50 mg tablet; Commonly known as: Ultram       Outpatient Follow-Up  Future Appointments   Date Time Provider Department Center   5/9/2025 10:45 AM Marcel Martinez PA-C [id number] [place]   8/19/2025  4:20 PM Gunner Quiroz MD [id number] [place]   10/8/2025  3:00 PM Jordan Modi DO [id number] [place]       MOLLY Sweeney-CNP

## 2025-04-26 NOTE — NURSING NOTE
Reviewed After Summary Visit for discharge with patient and wife present. Vitals stable for discharge. IV removed. Pt NAD. No reports of concern or questions at times of discharge.

## 2025-04-26 NOTE — PROGRESS NOTES
Occupational Therapy    Occupational Therapy    OT Treatment    Patient Name: Toribio Villatoro  MRN: [id number]  Today's Date: 4/26/2025  Time Calculation  Start Time: 0929  Stop Time: 0955  Time Calculation (min): 26 min       4104/4104-A    Assessment:  End of Session Communication: Bedside nurse  End of Session Patient Position: Up in chair, Alarm off, not on at start of session       Plan:  OT Frequency: Daily  OT Discharge Recommendations: Low intensity level of continued care     Subjective     Increased time spent with pt d/t acquiring proper size walker to use during tx.       04/26/25 0929   OT Last Visit   OT Received On 04/26/25   General   Reason for Referral recent surgery   Referred By Raleigh Campbell MD   Past Medical History Relevant to Rehab Admitted 4/24/2025 after having the following completed by Dr Campbell:    L3-4 RIGHT MICRODISCECTOMY, INSTRUMENTATION, POSTERIOR INTERBODY FUSION, POSTERIOR LATERAL FUSION        PMH: diabetic neuropathy, GERD, HLD, HTN, MI, RA   Family/Caregiver Present No   Prior to Session Communication Bedside nurse   Patient Position Received Bed, 3 rail up;Alarm off, not on at start of session   Preferred Learning Style verbal;visual   General Comment Patient found in room sitting EOB and agreeable to therapy upon arrival.   Precautions   Medical Precautions Fall precautions   Post-Surgical Precautions Spinal precautions   Precautions Comment back brace when out of bed   Pain Assessment   Pain Assessment 0-10   0-10 (Numeric) Pain Score 6   UE Dressing   UE Dressing Level of Assistance Close supervision   UE Dressing Where Assessed Edge of bed   UE Dressing Comments Pt dons back brace while seated EOB with close supervision.   Functional Mobility   Functional Mobility Performed Yes   Functional Mobility 1   Surface 1 Level tile   Device 1 Standard walker   Functional Mobility Support Devices Gait belt   Assistance 1 Contact guard   Comments 1 Pt demos functional mobility  in room and in hallway with FWW and CGA, no LOB observed.   Transfers   Transfer Yes   Transfer 1   Trials/Comments 1 Sit<->stand from EOB with CGA and FWW when in standing.  (VC's for hand placement.)   IP OT Assessment   End of Session Communication Bedside nurse   End of Session Patient Position Up in chair;Alarm off, not on at start of session   Education   Individual(s) Educated Patient   Patient Response to Education Patient/Caregiver Verbalized Understanding of Information   Education Comment Education provided to pt on spinal precautions, walker safety, body mechanics, home safety and terrain, and LHAE.   Inpatient Plan   OT Frequency Daily   OT Discharge Recommendations Low intensity level of continued care       Outcome Measures:[facility] Daily Activity  Putting on and taking off regular lower body clothing: A little  Bathing (including washing, rinsing, drying): A little  Putting on and taking off regular upper body clothing: None  Toileting, which includes using toilet, bedpan or urinal: A little  Taking care of personal grooming such as brushing teeth: None  Eating Meals: None  Daily Activity - Total Score: 21      Education Documentation  No documentation found.  Education Comments  No comments found.            Goals:  Encounter Problems       Encounter Problems (Active)       Dressings Lower Extremities       STG - Patient will complete lower body dressing independently with AE and comp strategies  (Progressing)       Start:  04/25/25    Expected End:  05/09/25               Functional Balance       STG-Patient will be independent with assistive device dynamic stand task >5 minutes for ADL completion   (Progressing)       Start:  04/25/25    Expected End:  05/09/25               Functional Mobility       STG-Patient will be independent with assistive device functional mobility tasks   (Progressing)       Start:  04/25/25    Expected End:  05/09/25               OT Transfers       STG-Patient will be  independent with functional transfers demonstrating good safety   (Progressing)       Start:  04/25/25    Expected End:  05/09/25               Safety       STG-Patient will independently verbalize spine precautions with all functional tasks   (Progressing)       Start:  04/25/25    Expected End:  05/09/25

## 2025-04-26 NOTE — CARE PLAN
The patient's goals for the shift include To rest    The clinical goals for the shift include pain will be managed    Over the shift, the patient did not make progress toward the following goals.       Problem: Pain  Goal: Turns in bed with improved pain control throughout the shift  Outcome: Progressing     Problem: Pain  Goal: Free from opioid side effects throughout the shift  Outcome: Progressing     Problem: Fall/Injury  Goal: Verbalize understanding of personal risk factors for fall in the hospital  Outcome: Progressing     Problem: Skin  Goal: Promote/optimize nutrition  Outcome: Progressing  Flowsheets (Taken 4/26/2025 0514)  Promote/optimize nutrition: Monitor/record intake including meals     Problem: Fall/Injury  Goal: Not fall by end of shift  Outcome: Progressing

## 2025-04-26 NOTE — NURSING NOTE
Patient transferred from CCU at about 2305. Arrived via wheelchair. Patient is alert and oriented. Able to stand to transfer to the bed. Currently wearing back brace, states he was told to sleep with it on.  Voided per the urinal. Medicated with Tylenol and flexeril. Call light within reach.

## 2025-04-28 ASSESSMENT — PAIN SCALES - GENERAL: PAIN_LEVEL: 0

## 2025-04-28 NOTE — ANESTHESIA POSTPROCEDURE EVALUATION
Patient: Toribio Villatoro    Procedure Summary       Date: 04/24/25 Room / Location: [facility] OR 06 / Virtual [facility] OR    Anesthesia Start: 1229 Anesthesia Stop: 1602    Procedure: L3-4 RIGHT MICRODISCECTOMY, INSTRUMENTATION, POSTERIOR INTERBODY FUSION, POSTERIOR LATERAL FUSION (Right: Spine Lumbar) Diagnosis:       Other intervertebral disc displacement, lumbar region      Spondylolisthesis, lumbar region      (Other intervertebral disc displacement, lumbar region [M51.26])      (Spondylolisthesis, lumbar region [M43.16])    Surgeons: Raleigh Lange MD Responsible Provider: Amauri Cherry DO    Anesthesia Type: general ASA Status: 3            Anesthesia Type: general    Vitals Value Taken Time   /65 04/24/25 19:30   Temp 36.5 °C (97.7 °F) 04/24/25 16:01   Pulse 88 04/24/25 19:30   Resp 13 04/24/25 19:30   SpO2 95 % 04/24/25 19:30       Anesthesia Post Evaluation    Patient location during evaluation: PACU  Patient participation: complete - patient participated  Level of consciousness: awake  Pain score: 0  Pain management: adequate  Multimodal analgesia pain management approach  Airway patency: patent  Two or more strategies used to mitigate risk of obstructive sleep apnea  Cardiovascular status: acceptable  Respiratory status: acceptable  Hydration status: acceptable  Postoperative Nausea and Vomiting: none        No notable events documented.

## 2025-04-30 ENCOUNTER — OFFICE VISIT (OUTPATIENT)
Dept: CARDIOLOGY | Facility: CLINIC | Age: 60
End: 2025-04-30
Payer: COMMERCIAL

## 2025-04-30 VITALS
SYSTOLIC BLOOD PRESSURE: 104 MMHG | HEART RATE: 76 BPM | WEIGHT: 257.5 LBS | DIASTOLIC BLOOD PRESSURE: 76 MMHG | BODY MASS INDEX: 38.14 KG/M2 | HEIGHT: 69 IN

## 2025-04-30 DIAGNOSIS — E11.22 TYPE 2 DIABETES MELLITUS WITH STAGE 3A CHRONIC KIDNEY DISEASE, WITHOUT LONG-TERM CURRENT USE OF INSULIN (MULTI): ICD-10-CM

## 2025-04-30 DIAGNOSIS — I50.22 CHRONIC SYSTOLIC (CONGESTIVE) HEART FAILURE: ICD-10-CM

## 2025-04-30 DIAGNOSIS — I42.0 DILATED IDIOPATHIC CARDIOMYOPATHY (MULTI): ICD-10-CM

## 2025-04-30 DIAGNOSIS — N18.31 TYPE 2 DIABETES MELLITUS WITH STAGE 3A CHRONIC KIDNEY DISEASE, WITHOUT LONG-TERM CURRENT USE OF INSULIN (MULTI): ICD-10-CM

## 2025-04-30 DIAGNOSIS — G47.33 OBSTRUCTIVE SLEEP APNEA, ADULT: ICD-10-CM

## 2025-04-30 DIAGNOSIS — Z78.9 NEVER SMOKED CIGARETTES: ICD-10-CM

## 2025-04-30 DIAGNOSIS — E78.2 HYPERLIPEMIA, MIXED: ICD-10-CM

## 2025-04-30 DIAGNOSIS — I51.7 LVH (LEFT VENTRICULAR HYPERTROPHY): ICD-10-CM

## 2025-04-30 DIAGNOSIS — I10 HTN (HYPERTENSION), BENIGN: Primary | ICD-10-CM

## 2025-04-30 PROBLEM — E66.01 MORBID OBESITY WITH BMI OF 40.0-44.9, ADULT (MULTI): Status: RESOLVED | Noted: 2023-09-06 | Resolved: 2025-04-30

## 2025-04-30 PROCEDURE — 1036F TOBACCO NON-USER: CPT | Performed by: INTERNAL MEDICINE

## 2025-04-30 PROCEDURE — 3008F BODY MASS INDEX DOCD: CPT | Performed by: INTERNAL MEDICINE

## 2025-04-30 PROCEDURE — 99214 OFFICE O/P EST MOD 30 MIN: CPT | Performed by: INTERNAL MEDICINE

## 2025-04-30 PROCEDURE — 3074F SYST BP LT 130 MM HG: CPT | Performed by: INTERNAL MEDICINE

## 2025-04-30 PROCEDURE — 3078F DIAST BP <80 MM HG: CPT | Performed by: INTERNAL MEDICINE

## 2025-04-30 RX ORDER — ACETAMINOPHEN 500 MG
1 TABLET ORAL DAILY
Qty: 1 KIT | Refills: 0 | Status: SHIPPED | OUTPATIENT
Start: 2025-04-30

## 2025-04-30 NOTE — PATIENT INSTRUCTIONS
Continue same medications and treatments.   Patient educated on proper medication use.   Patient educated on risk factor modification.   Please bring any lab results from other providers / physicians to your next appointment.     Please bring all medicines, vitamins, and herbal supplements with you when you come to the office.     Prescriptions will not be filled unless you are compliant with your follow up appointments or have a follow up appointment scheduled as per instruction of your physician. Refills should be requested at the time of your visit.    WILL KEEP SCHEDULED OFFICE VISIT ON 10/8/25    Ana PRESCOTT LPN, am scribing for and in the presence of Dr. Jordan Modi, DO, FACC

## 2025-04-30 NOTE — PROGRESS NOTES
CARDIOLOGY OFFICE VISIT      CHIEF COMPLAINT  Chief Complaint   Patient presents with    Follow-up     6 month follow up on Dilated nonischemic cardiomyopathy EF 39%  RVEF 43%  Chronic biventricular systolic congestive heart failure New York Heart Association class II-III  Hypertension  Left ventricular hypertrophy  Hyperlipidemia  management      Low blood pressure    HISTORY OF PRESENT ILLNESS  Patient is a 59-year-old  male with past medical history significant for dilated nonischemic cardiomyopathy EF 15-20%, chronic systolic congestive heart failure, hypertension, left ventricular hypertrophy, hyperlipidemia, diabetes mellitus, chronic kidney disease, obstructive sleep apnea on CPAP, obesity, rheumatoid arthritis presents for follow-up cardiovascular care.     Patient had recent back surgery for herniated disc without cardiac complication.  His blood pressure was low during his hospitalization he was taken off of amlodipine and spironolactone.  Denies chest pain, dyspnea, palpitations, nausea, vomiting, dizziness, fever, chill, bleeding.        Past medical history:  As above plus  Migraine headaches  Hypothyroid  Rheumatoid arthritis  Depression  Gastroesophageal reflux disease  Hypogonadism  Diabetes mellitus  Obstructive sleep apnea on CPAP    Past surgical history:  Right big toe resection  L3-4 right microdiscectomy/lumbar fusion     Social history:  Lifetime non-smoker  Occasional alcohol use     Family history:  Mother history of coronary artery disease in late 50s/early 60s with eventual coronary artery bypass  Brother history of coronary disease, myocardial infarction     Review of systems have been reviewed and are negative, noncontributory, or as previously mentioned x12 systems.     Assessment     Dilated nonischemic cardiomyopathy with improved systolic function  Dilated right ventricle with improved systolic function  Chronic biventricular systolic congestive heart failure New York Heart  Association class II-III  Hypertension  Left ventricular hypertrophy  Hyperlipidemia  Diabetes mellitus  Chronic kidney disease- Dr Handley  Obstructive sleep apnea and CPAP  Obesity  Rheumatoid arthritis.     Recommendations     Patient appears to be stable from a cardiac standpoint. He remains compensated from a heart failure standpoint.  He has improvement of biventricular systolic function on most recent echocardiogram.  Symptoms of angina. No symptomatic arrhythmia.  Since he had low blood pressure after surgery will stay off spironolactone and amlodipine.  I have asked him maintain blood pressure diary prior to future office visits.  Advise diet and weight loss.  Follow-up with myself as scheduled October 8     Please excuse any errors in grammar or translation related to this dictation. Voice recognition software was utilized to prepare this document.     Recent Cardiovascular Testing:  The following results have been reviewed and updated.   Labs 11/25/24  , , HDL 37, LDL 51     Cardiac cath 12/30/21  1. Normal coronaries  2. EF 15%     Echo 4/25/25  1. Normal right ventricular global systolic function  2. EF 55%   3. Mildly enlarged right ventricle      Cardiac MRI 4/6/22  1.Dilated left ventricle. 39%  2.RVEF 43%  3.Moderate enhancement imaging is normal       VITALS  Vitals:    04/30/25 1343   BP: 104/76   Pulse: 76     Wt Readings from Last 4 Encounters:   04/24/25 111 kg (245 lb)   04/15/25 111 kg (244 lb 7.8 oz)   04/08/25 111 kg (245 lb 9.6 oz)   04/02/25 111 kg (245 lb)       PHYSICAL EXAM:  GENERAL:  Well developed, well nourished, in no acute distress.  CHEST:  Symmetric and nontender.  NEURO/PSYCH:  Alert and oriented times three with approppriate behavior and responses.  NECK:  Supple, no JVD, no bruit.  LUNGS:  Clear to auscultation bilaterally, normal respiratory effort.  HEART:  Rate and rhythm REGULAR with no evident murmur, no gallop appreciated.    There are no rubs, clicks or  heaves.  EXTREMITIES:  Warm with good color, no clubbing or cyanosis.  There is no edema noted.  PERIPHERAL VASCULAR:  Pulses present and equally palpable; 2+ throughout.    ASSESSMENT AND PLAN  Diagnoses and all orders for this visit:  HTN (hypertension), benign  -     blood pressure monitor kit; 1 kit early in the morning.. Please dispense blood pressure monitor kit if covered by insurance  Dilated idiopathic cardiomyopathy (Multi)  Chronic systolic (congestive) heart failure  LVH (left ventricular hypertrophy)  Hyperlipemia, mixed  Type 2 diabetes mellitus with stage 3a chronic kidney disease, without long-term current use of insulin (Multi)  Obstructive sleep apnea, adult  Never smoked cigarettes  BMI 38.0-38.9,adult      Past Medical History  Medical History[1]    Social History  Social History[2]    Family History   Family History[3]     Allergies:  RX Allergies[4]     Outpatient Medications:  Current Outpatient Medications   Medication Instructions    atorvastatin (LIPITOR) 10 mg, oral, Daily    blood sugar diagnostic (Contour Next Test Strips) strip 1 strip in the morning and 1 strip in the evening.    carvedilol (COREG) 37.5 mg, oral, 2 times daily (morning and late afternoon)    cyclobenzaprine (FLEXERIL) 10 mg, oral, 3 times daily PRN    DULoxetine (CYMBALTA) 60 mg, oral, 2 times daily    empagliflozin-linagliptin (Glyxambi) 25-5 mg 1 tablet, oral, Daily    ergocalciferol, vitamin D2, (VITAMIN D2 ORAL) 1 tablet, oral, Daily    fenofibrate (TRIGLIDE) 160 mg, oral, Daily    FreeStyle glucose monitoring kit 1 each, miscellaneous, As needed    gabapentin (NEURONTIN) 300 mg, oral, 2 times daily    glycopyrrolate (ROBINUL) 2 mg, oral, 2 times daily    hydroxychloroquine (PLAQUENIL) 400 mg, oral, Daily, Per pt takes in the evening.    icosapent ethyL (VASCEPA) 2 g, oral, 2 times daily (morning and late afternoon)    Jardiance 10 mg, oral, Daily    lancets 33 gauge misc 1 Lancet, 2 times daily    levothyroxine  "(Synthroid, Levoxyl) 75 mcg tablet Take 6 of 7 mornings a week on an empty stomach at the same time each day, either 30 to 60 minutes prior to breakfast    metFORMIN (Glucophage) 500 mg tablet TAKE 1 TABLET BY MOUTH TWICE  DAILY    multivit-min/iron/folic acid/K (MULTI-DAY PLUS MINERALS ORAL) 1 tablet, oral, Daily    oxybutynin XL (DITROPAN-XL) 10 mg, oral, Daily    oxyCODONE (ROXICODONE) 5 mg, oral, Every 6 hours PRN    pantoprazole (PROTONIX) 40 mg, oral, Daily before breakfast    Rinvoq 15 mg, oral, Daily    sacubitriL-valsartan (Entresto)  mg tablet 1 tablet, oral, 2 times daily    syringe with needle (BD Luer-Kate Syringe) 3 mL 18 x 1 1/2\" syringe 3 mL, miscellaneous, Every 14 days, Use as directed    syringe with needle (BD Luer-Kate Syringe) 3 mL 23 x 1\" syringe USE 1 EVERY 14 AS DIRECTED EVERY OTHER WEEK FOR INJECTIONS    syringe, disposable, (BD Luer-Kate Syringe) 3 mL syringe 1 each, miscellaneous, Every 14 days, 22G x 1' 3ML, Use as directed every other week for injections    testosterone cypionate (Depo-Testosterone) 200 mg/mL injection 400 mg intramuscularly every 3 weeks, single dose vials.        Recent Lab Results:    CMP:    Lab Results   Component Value Date     04/26/2025     04/01/2025    K 4.1 04/26/2025    K 4.6 04/01/2025     04/26/2025     04/01/2025    CO2 25 04/26/2025    CO2 26 04/01/2025    BUN 24 (H) 04/26/2025    BUN 22 04/01/2025    CREATININE 1.18 04/26/2025    CREATININE 1.70 (H) 04/01/2025    GLUCOSE 121 (H) 04/26/2025    GLUCOSE 62 (L) 04/01/2025    CALCIUM 8.4 (L) 04/26/2025    CALCIUM 9.7 04/01/2025       Magnesium:    Lab Results   Component Value Date    MG 1.92 04/24/2025       Lipid Profile:    Lab Results   Component Value Date    TRIG 166 (H) 04/01/2025    HDL 41 04/01/2025    LDLCALC 66 04/01/2025    LDLDIRECT 60 08/02/2023       TSH:    Lab Results   Component Value Date    TSH 0.30 (L) 11/25/2024       BNP:   Lab Results   Component Value Date " "    (H) 12/30/2021        PT/INR:    Lab Results   Component Value Date    PROTIME 10.7 04/15/2025    INR 1.0 04/15/2025       HgBA1c:    Lab Results   Component Value Date    HGBA1C 6.7 (H) 04/01/2025       BMP:  Lab Results   Component Value Date     04/26/2025     04/25/2025     (L) 04/24/2025     04/01/2025    K 4.1 04/26/2025    K 4.6 04/25/2025    K 5.2 04/24/2025    K 4.6 04/01/2025     04/26/2025     04/25/2025     04/24/2025     04/01/2025    CO2 25 04/26/2025    CO2 27 04/25/2025    CO2 22 04/24/2025    CO2 26 04/01/2025    BUN 24 (H) 04/26/2025    BUN 28 (H) 04/25/2025    BUN 27 (H) 04/24/2025    BUN 22 04/01/2025    CREATININE 1.18 04/26/2025    CREATININE 1.46 (H) 04/25/2025    CREATININE 1.60 (H) 04/24/2025    CREATININE 1.70 (H) 04/01/2025       CBC:  Lab Results   Component Value Date    WBC 7.1 04/26/2025    WBC 9.2 04/25/2025    WBC 4.0 04/01/2025    WBC 5.4 11/25/2024    RBC 3.75 (L) 04/26/2025    RBC 4.08 (L) 04/25/2025    RBC 4.24 04/01/2025    RBC 4.24 (L) 11/25/2024    HGB 12.2 (L) 04/26/2025    HGB 13.1 (L) 04/25/2025    HGB 14.0 04/01/2025    HGB 13.9 11/25/2024    HCT 38.0 (L) 04/26/2025    HCT 41.0 04/25/2025    HCT 41.2 04/01/2025    HCT 41.8 11/25/2024     (H) 04/26/2025     (H) 04/25/2025    MCV 97.2 04/01/2025    MCV 99 11/25/2024    MCH 32.5 04/26/2025    MCH 32.1 04/25/2025    MCH 33.0 04/01/2025    MCH 32.8 11/25/2024    MCHC 32.1 04/26/2025    MCHC 32.0 04/25/2025    MCHC 34.0 04/01/2025    MCHC 33.3 11/25/2024    RDW 14.1 04/26/2025    RDW 14.1 04/25/2025    RDW 13.6 04/01/2025    RDW 13.5 11/25/2024     04/26/2025     04/25/2025     04/01/2025     11/25/2024    MPV 9.7 04/01/2025       Cardiac Enzymes:    No results found for: \"TROPHS\"    Hepatic Function Panel:    Lab Results   Component Value Date    ALKPHOS 23 (L) 04/01/2025    ALT 19 04/01/2025    AST 28 04/01/2025    PROT 6.7 " 04/01/2025    BILITOT 0.5 04/01/2025           I,Ana Burt LPN am scribing for, and in the presence of Dr. Jordan Modi DO, [facility].    I, Dr. Jordan Modi DO, JENNA, personally performed the services described in the documentation as scribed by Ana Burt LPN in my presence, and confirm it is both accurate and complete.      Dr. Jordan Modi DO   Thank you for allowing me to participate in the care of this patient. Please do not hesitate to contact me with any further questions or concerns.               [1]   Past Medical History:  Diagnosis Date    Allergic rhinitis due to pollen 05/16/2019    Seasonal allergic rhinitis due to pollen    Arthritis     Cardiomegaly 10/12/2022    LVH (left ventricular hypertrophy)    CHF (congestive heart failure)     Chronic kidney disease     Stage 3    CPAP (continuous positive airway pressure) dependence 2014    Diabetes mellitus (Multi)     type 2    Diabetic nephropathy (Multi)     Dilated idiopathic cardiomyopathy (Multi) 02/11/2023    Disorder of white blood cells, unspecified 06/23/2021    Neutrophilic leukocytosis    Generalized hyperhidrosis     Hyperhidrosis    GERD (gastroesophageal reflux disease)     Hearing aid worn 2023    Hyperlipidemia     Hypertension     Hypothyroidism     Lumbar disc disease     Myocardial infarction (Multi)     NSTEMI    Myocarditis 02/11/2023    NICM (nonischemic cardiomyopathy) (Multi) 02/11/2023    Other cardiomyopathies 10/12/2022    NICM (nonischemic cardiomyopathy)    Other hammer toe(s) (acquired), right foot 04/12/2022    Acquired hammertoe of right foot    Personal history of other benign neoplasm 05/20/2020    History of other benign neoplasm    Personal history of other diseases of the circulatory system 01/13/2022    History of myocarditis    Personal history of other specified conditions 06/23/2021    History of excessive sweating    Rheumatoid arthritis     Sleep apnea     Per pt uses a cpap machine.    Type  "2 diabetes mellitus    [2]   Social History  Tobacco Use    Smoking status: Never     Passive exposure: Never    Smokeless tobacco: Never   Vaping Use    Vaping status: Never Used   Substance Use Topics    Alcohol use: Yes     Comment: very rarely    Drug use: Never   [3]   Family History  Problem Relation Name Age of Onset    Heart attack Mother Juanita         acute    Breast cancer Mother Juanita     Mastectomy Mother Juanita     Cancer Father Jl     Heart attack Brother      Heart disease Maternal Grandmother Narcisa     Parkinsonism Maternal Grandfather      Diabetes Paternal Grandfather Jovanny     Heart attack Paternal Grandfather Jovanny    [4]   Allergies  Allergen Reactions    Lisinopril Hallucinations     Per pt \"causes suicidal thoughts\".     "

## 2025-05-09 ENCOUNTER — APPOINTMENT (OUTPATIENT)
Dept: ORTHOPEDIC SURGERY | Facility: CLINIC | Age: 60
End: 2025-05-09
Payer: COMMERCIAL

## 2025-05-09 ENCOUNTER — HOSPITAL ENCOUNTER (OUTPATIENT)
Dept: RADIOLOGY | Facility: HOSPITAL | Age: 60
Discharge: HOME | End: 2025-05-09
Payer: COMMERCIAL

## 2025-05-09 DIAGNOSIS — M54.50 LUMBAR PAIN: ICD-10-CM

## 2025-05-09 DIAGNOSIS — M54.16 LUMBAR RADICULOPATHY: Primary | ICD-10-CM

## 2025-05-09 PROCEDURE — 72100 X-RAY EXAM L-S SPINE 2/3 VWS: CPT | Performed by: RADIOLOGY

## 2025-05-09 PROCEDURE — 72100 X-RAY EXAM L-S SPINE 2/3 VWS: CPT

## 2025-05-09 NOTE — PROGRESS NOTES
Toribio Villatoro is a 60 y.o. male who presents for Post-op of the Lower Back (4/24/25 L3-4 MIS TLIF/15 days PO/X-rays today).    HPI:  60-year-old gentleman here for postop visit.  He is 2 weeks out from an L3-4 MIS TLIF.  He denies any fever chills nausea vomiting night sweats.  He has no bowel or bladder complaints.    Physical exam:  Well-nourished, well kept.No lymphangitis or lymphadenopathy in the examined extremities.  Gait normal.  Can stand on heels and toes.   Examination of the back shows mild tenderness in the paraspinous musculature.  There is decreased range of motion in all directions due to guarding/muscle spasms and pain at extremes.  There is good strength and no instability.  Examination of the lower extremities reveals no point tenderness, swelling, or deformity.  Range of motion of the hips, knees, and ankles are full without crepitance, instability, or exacerbation of pain.  Strength is 5/5 throughout.  No redness, abrasions, or lesions on extremities  Gross sensation intact in the extremities.    Affect normal.  Alert and oriented ×3.  Coordination normal.    Imaging studies:  AP lateral plain films of the lumbar spine were ordered and reviewed.    Assessment:  60-year-old gentleman here for postop visit.  He is 2 weeks out from an L3-4 MIS TLIF with microdiscectomy.  Overall he is doing very well.  He feels like he is at least 80% better.  Sharp pain that he was having in his right leg has significantly improved, he still has a little bit of ache in that leg.  His back pain is improved as well, he is still little sore and achy, this is most likely surgical pain.  He has been following postoperative restrictions.  His x-rays look good.  Hardware is in place without signs of malfunction or malposition.  Overall he is doing well and heading in the right direction.  He may be wearing his brace too frequently, we discussed the brace and its use again today.    Plan:  He can engage in light  activities as tolerated up to the restrictions of surgery, we will see him back in 3 months with x-rays of his lumbar spine at that time.    Marcel Martinez PA-C

## 2025-05-09 NOTE — LETTER
May 9, 2025     Patient: Toribio Villatoro   YOB: 1965   Date of Visit: 5/9/2025       To Whom It May Concern:    It is my medical opinion that Toribio Villatoro is to remain off work through 08/27/2025.He may return to work on 08/28/2025 with no restrictions.    If you have any questions or concerns, please don't hesitate to call.         Sincerely,        Marcel Martinez PA-C    CC: No Recipients

## 2025-05-13 LAB
LABORATORY COMMENT REPORT: NORMAL
PATH REPORT.FINAL DX SPEC: NORMAL
PATH REPORT.GROSS SPEC: NORMAL
PATH REPORT.RELEVANT HX SPEC: NORMAL
PATH REPORT.TOTAL CANCER: NORMAL

## 2025-05-14 LAB
ATRIAL RATE: 93 BPM
P AXIS: 56 DEGREES
P OFFSET: 190 MS
P ONSET: 129 MS
PR INTERVAL: 174 MS
Q ONSET: 216 MS
QRS COUNT: 15 BEATS
QRS DURATION: 100 MS
QT INTERVAL: 364 MS
QTC CALCULATION(BAZETT): 452 MS
QTC FREDERICIA: 421 MS
R AXIS: 13 DEGREES
T AXIS: -18 DEGREES
T OFFSET: 398 MS
VENTRICULAR RATE: 93 BPM

## 2025-06-08 ENCOUNTER — PATIENT MESSAGE (OUTPATIENT)
Dept: PRIMARY CARE | Facility: CLINIC | Age: 60
End: 2025-06-08
Payer: COMMERCIAL

## 2025-06-08 DIAGNOSIS — K21.9 GASTROESOPHAGEAL REFLUX DISEASE WITHOUT ESOPHAGITIS: ICD-10-CM

## 2025-06-08 DIAGNOSIS — M54.16 LUMBAR RADICULOPATHY: ICD-10-CM

## 2025-06-09 RX ORDER — GLYCOPYRROLATE 1 MG/1
2 TABLET ORAL 2 TIMES DAILY
Qty: 360 TABLET | Refills: 3 | Status: SHIPPED | OUTPATIENT
Start: 2025-06-09

## 2025-06-11 RX ORDER — GABAPENTIN 300 MG/1
300 CAPSULE ORAL 2 TIMES DAILY
Qty: 180 CAPSULE | Refills: 3 | Status: SHIPPED | OUTPATIENT
Start: 2025-06-11 | End: 2026-06-11

## 2025-06-24 ENCOUNTER — OFFICE VISIT (OUTPATIENT)
Dept: ORTHOPEDIC SURGERY | Facility: CLINIC | Age: 60
End: 2025-06-24
Payer: COMMERCIAL

## 2025-06-24 ENCOUNTER — HOSPITAL ENCOUNTER (OUTPATIENT)
Dept: RADIOLOGY | Facility: CLINIC | Age: 60
Discharge: HOME | End: 2025-06-24
Payer: COMMERCIAL

## 2025-06-24 DIAGNOSIS — M54.50 LUMBAR PAIN: ICD-10-CM

## 2025-06-24 DIAGNOSIS — M54.50 LUMBAR PAIN: Primary | ICD-10-CM

## 2025-06-24 PROCEDURE — 72100 X-RAY EXAM L-S SPINE 2/3 VWS: CPT

## 2025-06-24 PROCEDURE — 99212 OFFICE O/P EST SF 10 MIN: CPT | Performed by: ORTHOPAEDIC SURGERY

## 2025-06-24 PROCEDURE — 99024 POSTOP FOLLOW-UP VISIT: CPT | Performed by: ORTHOPAEDIC SURGERY

## 2025-06-24 PROCEDURE — 72100 X-RAY EXAM L-S SPINE 2/3 VWS: CPT | Performed by: ORTHOPAEDIC SURGERY

## 2025-06-24 NOTE — PROGRESS NOTES
Toribio Villatoro is a 60 y.o. male who presents for Follow-up of the Lower Back (4/24/25 L3-4 MIS TLIF/7 weeks out/X-rays today).    HPI:  60-year-old gentleman here for follow-up surgical visit.  He is 7 weeks out from an L3-4 MIS TLIF TLIF.  He denies any fever chills nausea vomiting night sweats.  He has no bowel or bladder complaints.    Physical exam:  Well-nourished, well kept.No lymphangitis or lymphadenopathy in the examined extremities.  Gait normal.  Can stand on heels and toes.   Examination of the back shows no significant tenderness in the paraspinous musculature.  There is minimally decreased range of motion in all directions due to guarding/muscle spasms and pain at extremes.  There is good strength and no instability.  Examination of the lower extremities reveals no point tenderness, swelling, or deformity.  Range of motion of the hips, knees, and ankles are full without crepitance, instability, or exacerbation of pain.  Strength is 5/5 throughout, except his right knee flexion extension is slightly decreased compared to the left, he does have knee issues and is wearing a knee brace.  No redness, abrasions, or lesions on extremities  Gross sensation intact in the extremities.  Affect normal.  Alert and oriented ×3.  Coordination normal.  His incisions are very well-healed.    Imaging studies:  AP lateral plain films of the lumbar spine were ordered and reviewed.    Assessment:  61-year-old gentleman here for surgical follow-up.  He is 7 weeks out from an L3-4 MIS TLIF.  He is doing really well today.  Overall he thinks he is close to 90 to 100% better.  He still has issues with his right knee, but he does have known osteoarthritis and knee problems for which she wears a brace.  Otherwise he has no significant pain or radicular symptoms.  His incisions are very well-healed.  He is walking appropriately throughout the day, he is following postop restrictions, he is wearing the brace as instructed.  He  had some questions today about his 3-month follow-up visit and about timing of returning to work.  He has a very strenuous job where he has to lift up to 50 pounds, after 3 months of weight limiting restrictions we will probably have him go to some strength training and postoperative rehabilitation physical therapy at his 3-month visit.    Plan:  We will see him back at his 3-month postop visit.  We will get AP lateral x-rays at that time.  We will most likely get him into some rehabilitation and strength training for his return to work in August.    Marcel Martinez PA-C

## 2025-07-22 DIAGNOSIS — E11.22 TYPE 2 DIABETES MELLITUS WITH STAGE 3A CHRONIC KIDNEY DISEASE, WITHOUT LONG-TERM CURRENT USE OF INSULIN (MULTI): ICD-10-CM

## 2025-07-22 DIAGNOSIS — N18.31 TYPE 2 DIABETES MELLITUS WITH STAGE 3A CHRONIC KIDNEY DISEASE, WITHOUT LONG-TERM CURRENT USE OF INSULIN (MULTI): ICD-10-CM

## 2025-07-22 DIAGNOSIS — E78.2 HYPERLIPEMIA, MIXED: ICD-10-CM

## 2025-07-22 DIAGNOSIS — I10 HTN (HYPERTENSION), BENIGN: ICD-10-CM

## 2025-07-25 DIAGNOSIS — E29.1 HYPOGONADISM MALE: ICD-10-CM

## 2025-07-26 RX ORDER — SYRINGE WITH NEEDLE, 1 ML 25GX5/8"
SYRINGE, EMPTY DISPOSABLE MISCELLANEOUS
Qty: 10 EACH | Refills: 5 | Status: SHIPPED | OUTPATIENT
Start: 2025-07-26

## 2025-07-29 ENCOUNTER — HOSPITAL ENCOUNTER (OUTPATIENT)
Dept: RADIOLOGY | Facility: CLINIC | Age: 60
Discharge: HOME | End: 2025-07-29
Payer: MEDICARE

## 2025-07-29 ENCOUNTER — OFFICE VISIT (OUTPATIENT)
Dept: ORTHOPEDIC SURGERY | Facility: CLINIC | Age: 60
End: 2025-07-29
Payer: MEDICARE

## 2025-07-29 DIAGNOSIS — M54.50 LUMBAR PAIN: ICD-10-CM

## 2025-07-29 DIAGNOSIS — M54.16 LUMBAR RADICULOPATHY: ICD-10-CM

## 2025-07-29 DIAGNOSIS — M54.16 LUMBAR RADICULOPATHY: Primary | ICD-10-CM

## 2025-07-29 PROCEDURE — 72100 X-RAY EXAM L-S SPINE 2/3 VWS: CPT | Performed by: ORTHOPAEDIC SURGERY

## 2025-07-29 PROCEDURE — 99213 OFFICE O/P EST LOW 20 MIN: CPT | Performed by: ORTHOPAEDIC SURGERY

## 2025-07-29 PROCEDURE — 72100 X-RAY EXAM L-S SPINE 2/3 VWS: CPT

## 2025-07-29 PROCEDURE — 99212 OFFICE O/P EST SF 10 MIN: CPT | Performed by: ORTHOPAEDIC SURGERY

## 2025-07-29 NOTE — PROGRESS NOTES
Toribio Villatoro is a 60 y.o. male who presents for Follow-up of the Lower Back (4/24/25 L3-4 MIS TLIF/3m 5d out/X-rays today).    HPI:  60-year-old gentleman here for surgical follow-up.  He is 3 months out from an L3-4 MIS TLIF.  He denies any fever chills nausea vomiting night sweats.  He has no bowel or bladder complaints.    Physical exam:  Well-nourished, well kept.No lymphangitis or lymphadenopathy in the examined extremities.  Gait normal.  Can stand on heels and toes.   Examination of the back shows no tenderness in the paraspinous musculature.  There is no significant decreased range of motion in all directions due to guarding/muscle spasms and pain at extremes.  There is good strength and no instability.  Examination of the lower extremities reveals no point tenderness, swelling, or deformity.  Range of motion of the hips, knees, and ankles are full without crepitance, instability, or exacerbation of pain.  Strength is 5/5 throughout.  No redness, abrasions, or lesions on extremities  Gross sensation intact in the extremities.   Affect normal.  Alert and oriented ×3.  Coordination normal.  MIS incisions are very well-healed.    Imaging studies:  AP lateral plain films of the lumbar spine were ordered and reviewed.    Assessment:  60-year-old gentleman here for surgical follow-up.  He is 3 months out from an L3-4 MIS TLIF.  He was not having a whole lot of back pain prior to surgery his main issue was right leg nerve pain.  He did have a little bit of right knee osteoarthritis but overall 3 months out and he feels nearly 100% better.  He has been following postoperative restrictions, he has been wearing his brace.  His x-rays look good.  His MIS incisions are very well-healed.  His right knee has improved somewhat as well.    We have ordered and reviewed tests, x-rays.  This is a patient with 2 chronic stable problems back pain, right leg radicular and knee pain.    For complete plan and/or surgical  details, please refer to Dr. Lange's portion of this split dictation.    -Marcel Martinez PA-C    In a face-to-face encounter, I performed a history and physical examination, discussed pertinent diagnostic studies if indicated, and discussed diagnosis and management strategies with both the patient and the midlevel provider.  I reviewed the midlevel's note and agree with the documented findings and plan of care.    Patient is 3 months out from an L3-4 MIS TLIF.  He is doing very well.  Most of his symptoms are gone.  He does have 2 chronic stable problems of some back pain and some right leg radicular pain but it is definitely improved significantly.  We have ordered and reviewed x-rays today.  We will get him into some physical therapy to get him conditioned for work and then he will start work in about a month.  Work involves lifting 50 pounds on a somewhat regular basis.  We will see him back in 3 months.    Raleigh Lange MD  Orthopedic surgery

## 2025-08-09 DIAGNOSIS — E11.9 TYPE 2 DIABETES MELLITUS WITHOUT COMPLICATION, WITHOUT LONG-TERM CURRENT USE OF INSULIN: ICD-10-CM

## 2025-08-11 DIAGNOSIS — I50.22 CHRONIC SYSTOLIC (CONGESTIVE) HEART FAILURE: ICD-10-CM

## 2025-08-11 RX ORDER — METFORMIN HYDROCHLORIDE 500 MG/1
500 TABLET ORAL
Qty: 180 TABLET | Refills: 3 | Status: SHIPPED | OUTPATIENT
Start: 2025-08-11

## 2025-08-12 ENCOUNTER — TELEPHONE (OUTPATIENT)
Dept: CARDIOLOGY | Facility: CLINIC | Age: 60
End: 2025-08-12
Payer: MEDICARE

## 2025-08-12 LAB
ALBUMIN SERPL-MCNC: 4.9 G/DL (ref 3.6–5.1)
ALP SERPL-CCNC: 26 U/L (ref 35–144)
ALT SERPL-CCNC: 27 U/L (ref 9–46)
ANION GAP SERPL CALCULATED.4IONS-SCNC: 9 MMOL/L (CALC) (ref 7–17)
AST SERPL-CCNC: 35 U/L (ref 10–35)
BASOPHILS # BLD AUTO: 21 CELLS/UL (ref 0–200)
BASOPHILS NFR BLD AUTO: 0.5 %
BILIRUB SERPL-MCNC: 0.6 MG/DL (ref 0.2–1.2)
BUN SERPL-MCNC: 33 MG/DL (ref 7–25)
CALCIUM SERPL-MCNC: 9.3 MG/DL (ref 8.6–10.3)
CHLORIDE SERPL-SCNC: 103 MMOL/L (ref 98–110)
CHOLEST SERPL-MCNC: 169 MG/DL
CHOLEST/HDLC SERPL: 4.2 (CALC)
CO2 SERPL-SCNC: 25 MMOL/L (ref 20–32)
CREAT SERPL-MCNC: 1.46 MG/DL (ref 0.7–1.35)
EGFRCR SERPLBLD CKD-EPI 2021: 54 ML/MIN/1.73M2
EOSINOPHIL # BLD AUTO: 8 CELLS/UL (ref 15–500)
EOSINOPHIL NFR BLD AUTO: 0.2 %
ERYTHROCYTE [DISTWIDTH] IN BLOOD BY AUTOMATED COUNT: 13.7 % (ref 11–15)
EST. AVERAGE GLUCOSE BLD GHB EST-MCNC: 148 MG/DL
EST. AVERAGE GLUCOSE BLD GHB EST-SCNC: 8.2 MMOL/L
GLUCOSE SERPL-MCNC: 114 MG/DL (ref 65–99)
HBA1C MFR BLD: 6.8 %
HCT VFR BLD AUTO: 52 % (ref 38.5–50)
HDLC SERPL-MCNC: 40 MG/DL
HGB BLD-MCNC: 17.5 G/DL (ref 13.2–17.1)
LDLC SERPL CALC-MCNC: 96 MG/DL (CALC)
LYMPHOCYTES # BLD AUTO: 1242 CELLS/UL (ref 850–3900)
LYMPHOCYTES NFR BLD AUTO: 30.3 %
MCH RBC QN AUTO: 32.3 PG (ref 27–33)
MCHC RBC AUTO-ENTMCNC: 33.7 G/DL (ref 32–36)
MCV RBC AUTO: 96.1 FL (ref 80–100)
MONOCYTES # BLD AUTO: 574 CELLS/UL (ref 200–950)
MONOCYTES NFR BLD AUTO: 14 %
NEUTROPHILS # BLD AUTO: 2255 CELLS/UL (ref 1500–7800)
NEUTROPHILS NFR BLD AUTO: 55 %
NONHDLC SERPL-MCNC: 129 MG/DL (CALC)
PLATELET # BLD AUTO: 244 THOUSAND/UL (ref 140–400)
PMV BLD REES-ECKER: 10 FL (ref 7.5–12.5)
POTASSIUM SERPL-SCNC: 4.8 MMOL/L (ref 3.5–5.3)
PROT SERPL-MCNC: 7.6 G/DL (ref 6.1–8.1)
RBC # BLD AUTO: 5.41 MILLION/UL (ref 4.2–5.8)
SERVICE CMNT-IMP: ABNORMAL
SODIUM SERPL-SCNC: 137 MMOL/L (ref 135–146)
TRIGL SERPL-MCNC: 239 MG/DL
WBC # BLD AUTO: 4.1 THOUSAND/UL (ref 3.8–10.8)

## 2025-08-13 LAB
ATRIAL RATE: 94 BPM
P AXIS: 43 DEGREES
P OFFSET: 181 MS
P ONSET: 118 MS
PR INTERVAL: 190 MS
Q ONSET: 213 MS
QRS COUNT: 16 BEATS
QRS DURATION: 102 MS
QT INTERVAL: 362 MS
QTC CALCULATION(BAZETT): 452 MS
QTC FREDERICIA: 420 MS
R AXIS: -8 DEGREES
T AXIS: 5 DEGREES
T OFFSET: 394 MS
VENTRICULAR RATE: 94 BPM

## 2025-08-15 ENCOUNTER — APPOINTMENT (OUTPATIENT)
Dept: ORTHOPEDIC SURGERY | Facility: CLINIC | Age: 60
End: 2025-08-15
Payer: COMMERCIAL

## 2025-08-15 DIAGNOSIS — I50.22 CHRONIC SYSTOLIC (CONGESTIVE) HEART FAILURE: ICD-10-CM

## 2025-08-16 RX ORDER — SACUBITRIL AND VALSARTAN 97; 103 MG/1; MG/1
1 TABLET, FILM COATED ORAL 2 TIMES DAILY
Qty: 60 TABLET | Refills: 0 | Status: SHIPPED | OUTPATIENT
Start: 2025-08-16 | End: 2025-09-15

## 2025-08-19 ENCOUNTER — APPOINTMENT (OUTPATIENT)
Dept: PRIMARY CARE | Facility: CLINIC | Age: 60
End: 2025-08-19
Payer: COMMERCIAL

## 2025-08-19 VITALS
HEART RATE: 78 BPM | DIASTOLIC BLOOD PRESSURE: 65 MMHG | RESPIRATION RATE: 18 BRPM | BODY MASS INDEX: 40.46 KG/M2 | SYSTOLIC BLOOD PRESSURE: 100 MMHG | WEIGHT: 274 LBS | OXYGEN SATURATION: 96 %

## 2025-08-19 DIAGNOSIS — E78.2 HYPERLIPEMIA, MIXED: Primary | ICD-10-CM

## 2025-08-19 DIAGNOSIS — E11.22 TYPE 2 DIABETES MELLITUS WITH STAGE 3A CHRONIC KIDNEY DISEASE, WITHOUT LONG-TERM CURRENT USE OF INSULIN (MULTI): ICD-10-CM

## 2025-08-19 DIAGNOSIS — I10 HTN (HYPERTENSION), BENIGN: ICD-10-CM

## 2025-08-19 DIAGNOSIS — N18.31 TYPE 2 DIABETES MELLITUS WITH STAGE 3A CHRONIC KIDNEY DISEASE, WITHOUT LONG-TERM CURRENT USE OF INSULIN (MULTI): ICD-10-CM

## 2025-08-19 DIAGNOSIS — E03.9 ACQUIRED HYPOTHYROIDISM: ICD-10-CM

## 2025-08-19 PROCEDURE — 3074F SYST BP LT 130 MM HG: CPT | Performed by: FAMILY MEDICINE

## 2025-08-19 PROCEDURE — 3078F DIAST BP <80 MM HG: CPT | Performed by: FAMILY MEDICINE

## 2025-08-19 PROCEDURE — 99214 OFFICE O/P EST MOD 30 MIN: CPT | Performed by: FAMILY MEDICINE

## 2025-08-19 RX ORDER — TRAMADOL HYDROCHLORIDE 50 MG/1
TABLET, FILM COATED ORAL
COMMUNITY

## 2025-08-19 RX ORDER — CHLORHEXIDINE GLUCONATE ORAL RINSE 1.2 MG/ML
SOLUTION DENTAL
COMMUNITY

## 2025-08-19 RX ORDER — BACLOFEN 10 MG/1
10 TABLET ORAL 3 TIMES DAILY PRN
COMMUNITY

## 2025-08-29 DIAGNOSIS — E11.9 TYPE 2 DIABETES MELLITUS WITHOUT COMPLICATION, WITHOUT LONG-TERM CURRENT USE OF INSULIN: ICD-10-CM

## 2025-08-29 DIAGNOSIS — E78.2 HYPERLIPEMIA, MIXED: ICD-10-CM

## 2025-08-29 RX ORDER — FENOFIBRATE 160 MG/1
160 TABLET ORAL DAILY
Qty: 90 TABLET | Refills: 3 | Status: SHIPPED | OUTPATIENT
Start: 2025-08-29

## 2025-10-08 ENCOUNTER — APPOINTMENT (OUTPATIENT)
Dept: CARDIOLOGY | Facility: CLINIC | Age: 60
End: 2025-10-08
Payer: COMMERCIAL

## 2025-12-11 ENCOUNTER — APPOINTMENT (OUTPATIENT)
Dept: PRIMARY CARE | Facility: CLINIC | Age: 60
End: 2025-12-11
Payer: MEDICARE

## (undated) DEVICE — Device

## (undated) DEVICE — GLOVE, SURGICAL, PROTEXIS PI , 7.5, PF, LF

## (undated) DEVICE — SEALANT, HEMOSTATIC, FLOSEAL, 10 ML

## (undated) DEVICE — ELECTRODE, ELECTROSURGICAL, BLADE EXT 4 INCH, INSULATED

## (undated) DEVICE — COVER, TABLE, 54X90

## (undated) DEVICE — GAS, NITROUS OXIDE, E CYLINDER

## (undated) DEVICE — TOWEL PACK, STERILE, 4/PACK, BLUE

## (undated) DEVICE — SOLUTION, IRRIGATION, STERILE WATER, 1000 ML, POUR BOTTLE

## (undated) DEVICE — GLOVE, SURGICAL, PROTEXIS PI MICRO, 7.5, PF, LF

## (undated) DEVICE — TUBING SET, HIGH PRESSURE, 3M, DISP

## (undated) DEVICE — IRRIGATION SYSTEM, WOUND, SURGIPHOR, 450ML, STERILE

## (undated) DEVICE — STAPLER, SKIN, PLUS, WIDE, 35

## (undated) DEVICE — DRAPE, SHEET, 17 X 23 IN

## (undated) DEVICE — CONTAINER, SPECIMEN, 4 OZ, OR PEEL PACK, STERILE

## (undated) DEVICE — DRAPE, MICROSCOPE, W/REMOVABLE LENS

## (undated) DEVICE — APPLICATOR, CHLORAPREP, W/ORANGE TINT, 26ML

## (undated) DEVICE — SUTURE, VICRYL, 2-0, 18 IN CP-2, UNDYED

## (undated) DEVICE — GLOVE, SURGICAL, PROTEXIS PI BLUE W/NEUTHERA, 8.0, PF, LF

## (undated) DEVICE — DRAPE, INCISE, ANTIMICROBIAL, IOBAN 2, STERI DRAPE, 23 X 33 IN, DISPOSABLE, STERILE

## (undated) DEVICE — SOLUTION, IRRIGATION, SODIUM CHLORIDE 0.9%, 1000 ML, POUR BOTTLE

## (undated) DEVICE — TIP, SUCTION, FRAZIER, W/CONTROL VENT, 12 FR

## (undated) DEVICE — GLOVE, SURGICAL, PROTEXIS PI , 8.0, PF, LF

## (undated) DEVICE — DRAPE, SHEET, XL

## (undated) DEVICE — GOWN, SURGICAL, IMPLT, BACK, XLARGE, XLONG, STERILE

## (undated) DEVICE — WAX, BONE, 2.5 GM

## (undated) DEVICE — BUR, 3MM PRECISION MATCH HEAD, 16CM

## (undated) DEVICE — DRESSING, TRANSPARENT, TEGADERM, 4 X 4-3/4 IN